# Patient Record
Sex: FEMALE | Race: WHITE | Employment: FULL TIME | URBAN - NONMETROPOLITAN AREA
[De-identification: names, ages, dates, MRNs, and addresses within clinical notes are randomized per-mention and may not be internally consistent; named-entity substitution may affect disease eponyms.]

---

## 2017-01-16 ENCOUNTER — OFFICE VISIT (OUTPATIENT)
Dept: FAMILY MEDICINE CLINIC | Age: 62
End: 2017-01-16

## 2017-01-16 VITALS
SYSTOLIC BLOOD PRESSURE: 122 MMHG | WEIGHT: 293 LBS | HEIGHT: 66 IN | RESPIRATION RATE: 12 BRPM | HEART RATE: 68 BPM | BODY MASS INDEX: 47.09 KG/M2 | DIASTOLIC BLOOD PRESSURE: 80 MMHG

## 2017-01-16 DIAGNOSIS — B02.30 HERPES ZOSTER WITH OPHTHALMIC COMPLICATION, UNSPECIFIED HERPES ZOSTER EYE DISEASE: Primary | ICD-10-CM

## 2017-01-16 DIAGNOSIS — E11.9 CONTROLLED TYPE 2 DIABETES MELLITUS WITHOUT COMPLICATION, WITHOUT LONG-TERM CURRENT USE OF INSULIN (HCC): ICD-10-CM

## 2017-01-16 DIAGNOSIS — B02.9 HERPES ZOSTER WITHOUT COMPLICATION: ICD-10-CM

## 2017-01-16 PROCEDURE — 99213 OFFICE O/P EST LOW 20 MIN: CPT | Performed by: FAMILY MEDICINE

## 2017-01-16 RX ORDER — TRAMADOL HYDROCHLORIDE 50 MG/1
50 TABLET ORAL EVERY 6 HOURS PRN
Qty: 20 TABLET | Refills: 0 | Status: SHIPPED | OUTPATIENT
Start: 2017-01-16 | End: 2017-10-19 | Stop reason: ALTCHOICE

## 2017-01-16 RX ORDER — ACYCLOVIR 800 MG/1
TABLET ORAL
Qty: 35 TABLET | Refills: 0 | Status: SHIPPED | OUTPATIENT
Start: 2017-01-16 | End: 2017-01-26

## 2017-01-16 ASSESSMENT — ENCOUNTER SYMPTOMS
VOMITING: 0
COUGH: 0
SHORTNESS OF BREATH: 0
DIARRHEA: 0
WHEEZING: 0
EYE PAIN: 0
SORE THROAT: 0
BACK PAIN: 0
CONSTIPATION: 0
NAUSEA: 0
BLOOD IN STOOL: 0
RHINORRHEA: 0
CHEST TIGHTNESS: 0
ABDOMINAL PAIN: 0

## 2017-01-19 ENCOUNTER — TELEPHONE (OUTPATIENT)
Dept: FAMILY MEDICINE CLINIC | Age: 62
End: 2017-01-19

## 2017-01-19 DIAGNOSIS — B02.30 HERPES ZOSTER WITH OPHTHALMIC COMPLICATION, UNSPECIFIED HERPES ZOSTER EYE DISEASE: Primary | ICD-10-CM

## 2017-01-19 RX ORDER — GABAPENTIN 300 MG/1
300 CAPSULE ORAL 2 TIMES DAILY
Qty: 30 CAPSULE | Refills: 0 | Status: SHIPPED | OUTPATIENT
Start: 2017-01-19 | End: 2017-10-19 | Stop reason: ALTCHOICE

## 2017-08-13 DIAGNOSIS — F32.A DEPRESSION, UNSPECIFIED DEPRESSION TYPE: ICD-10-CM

## 2017-08-13 DIAGNOSIS — R51.9 NONINTRACTABLE HEADACHE, UNSPECIFIED CHRONICITY PATTERN, UNSPECIFIED HEADACHE TYPE: ICD-10-CM

## 2017-08-13 DIAGNOSIS — E78.00 PURE HYPERCHOLESTEROLEMIA: ICD-10-CM

## 2017-08-14 DIAGNOSIS — F32.A DEPRESSION, UNSPECIFIED DEPRESSION TYPE: ICD-10-CM

## 2017-08-14 DIAGNOSIS — R51.9 NONINTRACTABLE HEADACHE, UNSPECIFIED CHRONICITY PATTERN, UNSPECIFIED HEADACHE TYPE: ICD-10-CM

## 2017-08-14 DIAGNOSIS — E78.00 PURE HYPERCHOLESTEROLEMIA: ICD-10-CM

## 2017-08-14 RX ORDER — CITALOPRAM 20 MG/1
TABLET ORAL
Qty: 30 TABLET | Refills: 0 | Status: SHIPPED | OUTPATIENT
Start: 2017-08-14 | End: 2017-10-19 | Stop reason: SDUPTHER

## 2017-08-14 RX ORDER — PROPRANOLOL HYDROCHLORIDE 160 MG/1
CAPSULE, EXTENDED RELEASE ORAL
Qty: 30 CAPSULE | Refills: 0 | Status: SHIPPED | OUTPATIENT
Start: 2017-08-14 | End: 2017-10-19 | Stop reason: SDUPTHER

## 2017-08-14 RX ORDER — ATORVASTATIN CALCIUM 10 MG/1
10 TABLET, FILM COATED ORAL DAILY
Qty: 30 TABLET | Refills: 0 | Status: SHIPPED | OUTPATIENT
Start: 2017-08-14 | End: 2017-10-19 | Stop reason: ALTCHOICE

## 2017-08-15 RX ORDER — CITALOPRAM 20 MG/1
TABLET ORAL
Qty: 90 TABLET | OUTPATIENT
Start: 2017-08-15

## 2017-08-15 RX ORDER — ATORVASTATIN CALCIUM 10 MG/1
TABLET, FILM COATED ORAL
Qty: 90 TABLET | OUTPATIENT
Start: 2017-08-15

## 2017-08-15 RX ORDER — PROPRANOLOL HYDROCHLORIDE 160 MG/1
CAPSULE, EXTENDED RELEASE ORAL
Qty: 90 CAPSULE | OUTPATIENT
Start: 2017-08-15

## 2017-08-25 LAB
AVERAGE GLUCOSE: NORMAL
CHOLESTEROL, TOTAL: 166 MG/DL
CHOLESTEROL/HDL RATIO: 4.7
HBA1C MFR BLD: 7.4 %
HDLC SERPL-MCNC: 35 MG/DL (ref 35–70)
LDL CHOLESTEROL CALCULATED: 94 MG/DL (ref 0–160)
TRIGL SERPL-MCNC: 185 MG/DL
VLDLC SERPL CALC-MCNC: 37 MG/DL

## 2017-10-06 DIAGNOSIS — F32.A DEPRESSION, UNSPECIFIED DEPRESSION TYPE: ICD-10-CM

## 2017-10-06 DIAGNOSIS — R51.9 NONINTRACTABLE HEADACHE, UNSPECIFIED CHRONICITY PATTERN, UNSPECIFIED HEADACHE TYPE: ICD-10-CM

## 2017-10-06 DIAGNOSIS — N18.30 TYPE 2 DIABETES MELLITUS WITH STAGE 3 CHRONIC KIDNEY DISEASE, WITHOUT LONG-TERM CURRENT USE OF INSULIN (HCC): ICD-10-CM

## 2017-10-06 DIAGNOSIS — E11.22 TYPE 2 DIABETES MELLITUS WITH STAGE 3 CHRONIC KIDNEY DISEASE, WITHOUT LONG-TERM CURRENT USE OF INSULIN (HCC): ICD-10-CM

## 2017-10-06 DIAGNOSIS — E78.00 PURE HYPERCHOLESTEROLEMIA: ICD-10-CM

## 2017-10-09 RX ORDER — ATORVASTATIN CALCIUM 10 MG/1
TABLET, FILM COATED ORAL
Qty: 90 TABLET | OUTPATIENT
Start: 2017-10-09

## 2017-10-09 RX ORDER — CITALOPRAM 20 MG/1
TABLET ORAL
Qty: 90 TABLET | OUTPATIENT
Start: 2017-10-09

## 2017-10-09 RX ORDER — PROPRANOLOL HYDROCHLORIDE 160 MG/1
CAPSULE, EXTENDED RELEASE ORAL
Qty: 90 CAPSULE | OUTPATIENT
Start: 2017-10-09

## 2017-10-09 RX ORDER — GLIMEPIRIDE 2 MG/1
TABLET ORAL
Qty: 180 TABLET | OUTPATIENT
Start: 2017-10-09

## 2017-10-10 DIAGNOSIS — F51.01 PRIMARY INSOMNIA: ICD-10-CM

## 2017-10-10 RX ORDER — TRAZODONE HYDROCHLORIDE 50 MG/1
TABLET ORAL
Qty: 180 TABLET | OUTPATIENT
Start: 2017-10-10

## 2017-10-19 ENCOUNTER — OFFICE VISIT (OUTPATIENT)
Dept: FAMILY MEDICINE CLINIC | Age: 62
End: 2017-10-19
Payer: COMMERCIAL

## 2017-10-19 VITALS
WEIGHT: 293 LBS | RESPIRATION RATE: 12 BRPM | BODY MASS INDEX: 47.09 KG/M2 | HEART RATE: 68 BPM | SYSTOLIC BLOOD PRESSURE: 110 MMHG | DIASTOLIC BLOOD PRESSURE: 78 MMHG | HEIGHT: 66 IN

## 2017-10-19 DIAGNOSIS — E78.00 HYPERCHOLESTEROLEMIA: ICD-10-CM

## 2017-10-19 DIAGNOSIS — E11.22 TYPE 2 DIABETES MELLITUS WITH STAGE 3 CHRONIC KIDNEY DISEASE, WITHOUT LONG-TERM CURRENT USE OF INSULIN (HCC): ICD-10-CM

## 2017-10-19 DIAGNOSIS — F32.A DEPRESSION, UNSPECIFIED DEPRESSION TYPE: ICD-10-CM

## 2017-10-19 DIAGNOSIS — R06.02 SOB (SHORTNESS OF BREATH): ICD-10-CM

## 2017-10-19 DIAGNOSIS — Z00.00 WELL ADULT EXAM: Primary | ICD-10-CM

## 2017-10-19 DIAGNOSIS — N18.30 TYPE 2 DIABETES MELLITUS WITH STAGE 3 CHRONIC KIDNEY DISEASE, WITHOUT LONG-TERM CURRENT USE OF INSULIN (HCC): ICD-10-CM

## 2017-10-19 DIAGNOSIS — R51.9 NONINTRACTABLE HEADACHE, UNSPECIFIED CHRONICITY PATTERN, UNSPECIFIED HEADACHE TYPE: ICD-10-CM

## 2017-10-19 DIAGNOSIS — Z23 NEED FOR INFLUENZA VACCINATION: ICD-10-CM

## 2017-10-19 LAB
CREATININE URINE POCT: NORMAL
MICROALBUMIN/CREAT 24H UR: NORMAL MG/G{CREAT}
MICROALBUMIN/CREAT UR-RTO: NORMAL

## 2017-10-19 PROCEDURE — 90471 IMMUNIZATION ADMIN: CPT | Performed by: FAMILY MEDICINE

## 2017-10-19 PROCEDURE — 90688 IIV4 VACCINE SPLT 0.5 ML IM: CPT | Performed by: FAMILY MEDICINE

## 2017-10-19 PROCEDURE — 99396 PREV VISIT EST AGE 40-64: CPT | Performed by: FAMILY MEDICINE

## 2017-10-19 PROCEDURE — 82044 UR ALBUMIN SEMIQUANTITATIVE: CPT | Performed by: FAMILY MEDICINE

## 2017-10-19 RX ORDER — TRAZODONE HYDROCHLORIDE 50 MG/1
50 TABLET ORAL NIGHTLY
COMMUNITY
End: 2017-10-19 | Stop reason: ALTCHOICE

## 2017-10-19 RX ORDER — CITALOPRAM 20 MG/1
TABLET ORAL
Qty: 90 TABLET | Refills: 3 | Status: SHIPPED | OUTPATIENT
Start: 2017-10-19 | End: 2017-12-21 | Stop reason: SDUPTHER

## 2017-10-19 RX ORDER — PROPRANOLOL HYDROCHLORIDE 160 MG/1
CAPSULE, EXTENDED RELEASE ORAL
Qty: 90 CAPSULE | Refills: 3 | Status: SHIPPED | OUTPATIENT
Start: 2017-10-19 | End: 2017-12-21 | Stop reason: SDUPTHER

## 2017-10-19 RX ORDER — GLIMEPIRIDE 2 MG/1
2 TABLET ORAL
Qty: 180 TABLET | Refills: 3 | Status: SHIPPED | OUTPATIENT
Start: 2017-10-19 | End: 2018-10-01 | Stop reason: SDUPTHER

## 2017-10-19 RX ORDER — PRAVASTATIN SODIUM 40 MG
40 TABLET ORAL DAILY
Qty: 30 TABLET | Refills: 0 | Status: SHIPPED | OUTPATIENT
Start: 2017-10-19 | End: 2017-12-21 | Stop reason: SDUPTHER

## 2017-10-19 RX ORDER — ASPIRIN 81 MG/1
81 TABLET ORAL DAILY
Qty: 90 TABLET | Refills: 3 | COMMUNITY
Start: 2017-10-19 | End: 2018-10-01 | Stop reason: SDUPTHER

## 2017-10-19 RX ORDER — GABAPENTIN 300 MG/1
300 CAPSULE ORAL NIGHTLY
Qty: 30 CAPSULE | Refills: 0 | Status: SHIPPED | OUTPATIENT
Start: 2017-10-19 | End: 2018-10-01 | Stop reason: ALTCHOICE

## 2017-10-19 ASSESSMENT — ENCOUNTER SYMPTOMS
ABDOMINAL PAIN: 0
RHINORRHEA: 0
CONSTIPATION: 0
SHORTNESS OF BREATH: 0
DIARRHEA: 0
BACK PAIN: 0
WHEEZING: 0
NAUSEA: 0
EYE PAIN: 0
BLOOD IN STOOL: 0
COUGH: 0
CHEST TIGHTNESS: 0
VOMITING: 0
SORE THROAT: 0

## 2017-10-19 ASSESSMENT — PATIENT HEALTH QUESTIONNAIRE - PHQ9
1. LITTLE INTEREST OR PLEASURE IN DOING THINGS: 0
2. FEELING DOWN, DEPRESSED OR HOPELESS: 0
SUM OF ALL RESPONSES TO PHQ9 QUESTIONS 1 & 2: 0
SUM OF ALL RESPONSES TO PHQ QUESTIONS 1-9: 0

## 2017-10-19 NOTE — PROGRESS NOTES
Subjective:      Patient ID: Krystal Mcnulty is a 63 y.o. female. HPI  Pt here for annual wellness exam and med refills. Reviewed BMI of 48. Encouraged diet, exercise and weight loss. Reviewed Blood work from 08/2017, a1c of 7.4. Reviewed health maintenance, flu shot. Denies any current problems, is feeling well. , former smoker, pmh reviewed. Review of Systems   Constitutional: Negative for chills, fatigue, fever and unexpected weight change. HENT: Negative for congestion, ear pain, rhinorrhea and sore throat. Eyes: Negative for pain and visual disturbance. Respiratory: Negative for cough, chest tightness, shortness of breath and wheezing. Cardiovascular: Negative for chest pain and palpitations. Gastrointestinal: Negative for abdominal pain, blood in stool, constipation, diarrhea, nausea and vomiting. Genitourinary: Negative for difficulty urinating, frequency, hematuria and urgency. Musculoskeletal: Negative for back pain, joint swelling, myalgias and neck pain. Skin: Negative for rash. Neurological: Negative for dizziness and headaches. Hematological: Negative for adenopathy. Does not bruise/bleed easily. Psychiatric/Behavioral: Negative for behavioral problems and sleep disturbance. The patient is not nervous/anxious. Objective:   Physical Exam   Constitutional: She is oriented to person, place, and time. She appears well-developed and well-nourished. HENT:   Head: Normocephalic and atraumatic. Right Ear: External ear normal.   Left Ear: External ear normal.   Nose: Nose normal.   Mouth/Throat: Oropharynx is clear and moist.   Eyes: EOM are normal. Pupils are equal, round, and reactive to light. Neck: Neck supple. Carotid bruit is not present. No thyromegaly present. Cardiovascular: Normal rate, regular rhythm and normal heart sounds. Pulmonary/Chest: Breath sounds normal. She has no wheezes. She has no rales. Abdominal: Soft.  Bowel sounds are normal. There is no tenderness. There is no rebound and no guarding. Musculoskeletal: Normal range of motion. She exhibits no edema. No open areas on the feet. Sensation intact. Lymphadenopathy:     She has no cervical adenopathy. Neurological: She is alert and oriented to person, place, and time. She has normal reflexes. No cranial nerve deficit. Skin: Skin is warm and dry. No rash noted. Psychiatric: She has a normal mood and affect. Nursing note and vitals reviewed.     Health Maintenance   Topic Date Due    Hepatitis C screen  1955    HIV screen  11/14/1972    DTaP/Tdap/Td vaccine (1 - Tdap) 11/14/1976    Flu vaccine (1) 09/01/2017    Diabetic foot exam  10/13/2017    Diabetic microalbuminuria test  10/13/2017    Diabetic retinal exam  01/11/2018    Cervical cancer screen  07/01/2018    Breast cancer screen  08/18/2018    Diabetic hemoglobin A1C test  08/25/2018    Lipid screen  08/25/2018    Colon cancer screen colonoscopy  11/20/2020    Pneumococcal med risk  Completed     Lab Results   Component Value Date    LABA1C 7.4 08/25/2017     No results found for: EAG  Lab Results   Component Value Date    CHOL 166 08/25/2017    CHOL 155 08/25/2016    CHOL 143 08/20/2015     Lab Results   Component Value Date    TRIG 185 08/25/2017    TRIG 168 08/25/2016    TRIG 124 08/20/2015     Lab Results   Component Value Date    HDL 35 08/25/2017    HDL 36 08/25/2016    HDL 38 08/20/2015     Lab Results   Component Value Date    LDLCALC 94 08/25/2017    LDLCALC 85 08/25/2016    LDLCALC 80 08/20/2015     Lab Results   Component Value Date    LABVLDL 32 (H) 01/21/2015    LABVLDL 31 (H) 09/19/2014    VLDL 37 08/25/2017    VLDL 34 08/25/2016    VLDL 25 08/20/2015     Lab Results   Component Value Date    CHOLHDLRATIO 4.7 08/25/2017    CHOLHDLRATIO 4.3 08/25/2016    CHOLHDLRATIO 3.8 08/20/2015       Assessment:      63year old well exam      Plan:      Refill meds  Reviewed blood work  Urine

## 2017-10-19 NOTE — PATIENT INSTRUCTIONS
Patient Education        Well Visit, Women 48 to 72: Care Instructions  Your Care Instructions  Physical exams can help you stay healthy. Your doctor has checked your overall health and may have suggested ways to take good care of yourself. He or she also may have recommended tests. At home, you can help prevent illness with healthy eating, regular exercise, and other steps. Follow-up care is a key part of your treatment and safety. Be sure to make and go to all appointments, and call your doctor if you are having problems. It's also a good idea to know your test results and keep a list of the medicines you take. How can you care for yourself at home? · Reach and stay at a healthy weight. This will lower your risk for many problems, such as obesity, diabetes, heart disease, and high blood pressure. · Get at least 30 minutes of exercise on most days of the week. Walking is a good choice. You also may want to do other activities, such as running, swimming, cycling, or playing tennis or team sports. · Do not smoke. Smoking can make health problems worse. If you need help quitting, talk to your doctor about stop-smoking programs and medicines. These can increase your chances of quitting for good. · Protect your skin from too much sun. When you're outdoors from 10 a.m. to 4 p.m., stay in the shade or cover up with clothing and a hat with a wide brim. Wear sunglasses that block UV rays. Even when it's cloudy, put broad-spectrum sunscreen (SPF 30 or higher) on any exposed skin. · See a dentist one or two times a year for checkups and to have your teeth cleaned. · Wear a seat belt in the car. · Limit alcohol to 1 drink a day. Too much alcohol can cause health problems. Follow your doctor's advice about when to have certain tests. These tests can spot problems early. · Cholesterol.  Your doctor will tell you how often to have this done based on your age, family history, or other things that can increase your risk for heart attack and stroke. · Blood pressure. Have your blood pressure checked during a routine doctor visit. Your doctor will tell you how often to check your blood pressure based on your age, your blood pressure results, and other factors. · Mammogram. Ask your doctor how often you should have a mammogram, which is an X-ray of your breasts. A mammogram can spot breast cancer before it can be felt and when it is easiest to treat. · Pap test and pelvic exam. Ask your doctor how often you should have a Pap test. You may not need to have a Pap test as often as you used to. · Vision. Have your eyes checked every year or two or as often as your doctor suggests. Some experts recommend that you have yearly exams for glaucoma and other age-related eye problems starting at age 48. · Hearing. Tell your doctor if you notice any change in your hearing. You can have tests to find out how well you hear. · Diabetes. Ask your doctor whether you should have tests for diabetes. · Colon cancer. You should begin tests for colon cancer at age 48. You may have one of several tests. Your doctor will tell you how often to have tests based on your age and risk. Risks include whether you already had a precancerous polyp removed from your colon or whether your parents, sisters and brothers, or children have had colon cancer. · Thyroid disease. Talk to your doctor about whether to have your thyroid checked as part of a regular physical exam. Women have an increased chance of a thyroid problem. · Osteoporosis. You should begin tests for bone density at age 72. If you are younger than 72, ask your doctor whether you have factors that may increase your risk for this disease. You may want to have this test before age 72. · Heart attack and stroke risk. At least every 4 to 6 years, you should have your risk for heart attack and stroke assessed.  Your doctor uses factors such as your age, blood pressure, cholesterol, and whether you smoke or have diabetes to show what your risk for a heart attack or stroke is over the next 10 years. When should you call for help? Watch closely for changes in your health, and be sure to contact your doctor if you have any problems or symptoms that concern you. Where can you learn more? Go to https://chpepiceweb.healthOrganically Maid. org and sign in to your Answer.To account. Enter B669 in the Aldis box to learn more about \"Well Visit, Women 50 to 72: Care Instructions. \"     If you do not have an account, please click on the \"Sign Up Now\" link. Current as of: July 19, 2016  Content Version: 11.3  © 4196-1015 Ponfac. Care instructions adapted under license by [facility] ([facility]). If you have questions about a medical condition or this instruction, always ask your healthcare professional. [place] [address] any warranty or liability for your use of this information. Patient Education        High Cholesterol: Care Instructions  Your Care Instructions  Cholesterol is a type of fat in your blood. It is needed for many body functions, such as making new cells. Cholesterol is made by your body. It also comes from food you eat. High cholesterol means that you have too much of the fat in your blood. This raises your risk of a heart attack and stroke. LDL and HDL are part of your total cholesterol. LDL is the \"bad\" cholesterol. High LDL can raise your risk for heart disease, heart attack, and stroke. HDL is the \"good\" cholesterol. It helps clear bad cholesterol from the body. High HDL is linked with a lower risk of heart disease, heart attack, and stroke. Your cholesterol levels help your doctor find out your risk for having a heart attack or stroke. You and your doctor can talk about whether you need to lower your risk and what treatment is best for you. A heart-healthy lifestyle along with medicines can help lower your cholesterol and your risk.  The way you choose to lower your risk will depend on how high your risk is for heart attack and stroke. It will also depend on how you feel about taking medicines. Follow-up care is a key part of your treatment and safety. Be sure to make and go to all appointments, and call your doctor if you are having problems. It's also a good idea to know your test results and keep a list of the medicines you take. How can you care for yourself at home? · Eat a variety of foods every day. Good choices include fruits, vegetables, whole grains (like oatmeal), dried beans and peas, nuts and seeds, soy products (like tofu), and fat-free or low-fat dairy products. · Replace butter, margarine, and hydrogenated or partially hydrogenated oils with olive and canola oils. (Canola oil margarine without trans fat is fine.)  · Replace red meat with fish, poultry, and soy protein (like tofu). · Limit processed and packaged foods like chips, crackers, and cookies. · Bake, broil, or steam foods. Don't chung them. · Be physically active. Get at least 30 minutes of exercise on most days of the week. Walking is a good choice. You also may want to do other activities, such as running, swimming, cycling, or playing tennis or team sports. · Stay at a healthy weight or lose weight by making the changes in eating and physical activity listed above. Losing just a small amount of weight, even 5 to 10 pounds, can reduce your risk for having a heart attack or stroke. · Do not smoke. When should you call for help? Watch closely for changes in your health, and be sure to contact your doctor if:  · You need help making lifestyle changes. · You have questions about your medicine. Where can you learn more? Go to https://Arch GrantspeBerkÃ¤na Wireless.Sportboom. org and sign in to your Leikr account. Enter H745 in the [employer] box to learn more about \"High Cholesterol: Care Instructions. \"     If you do not have an account, please click on the \"Sign Up Now\" link.   Current exercise on most, preferably all, days of the week. Walking is a good choice. You also may want to do other activities, such as running, swimming, cycling, or playing tennis or team sports. If your doctor says it's okay, do muscle-strengthening exercises at least 2 times a week. ¨ Take your medicines exactly as prescribed. Call your doctor if you think you are having a problem with your medicine. You will get more details on the specific medicines your doctor prescribes. · Check your blood sugar as often as your doctor recommends. It is important to keep track of any symptoms you have, such as low blood sugar. Also tell your doctor if you have any changes in your activities, diet, or insulin use. · Talk to your doctor before you start taking aspirin every day. Aspirin can help certain people lower their risk of a heart attack or stroke. But taking aspirin isn't right for everyone, because it can cause serious bleeding. · Do not smoke. If you need help quitting, talk to your doctor about stop-smoking programs and medicines. These can increase your chances of quitting for good. · Keep your cholesterol and blood pressure at normal levels. You may need to take one or more medicines to reach your goals. Take them exactly as directed. Do not stop or change a medicine without talking to your doctor first.  When should you call for help? Call 911 anytime you think you may need emergency care. For example, call if:  · You passed out (lost consciousness), or you suddenly become very sleepy or confused. (You may have very low blood sugar.)  Call your doctor now or seek immediate medical care if:  · Your blood sugar is 300 mg/dL or is higher than the level your doctor has set for you. · You have symptoms of low blood sugar, such as:  ¨ Sweating. ¨ Feeling nervous, shaky, and weak. ¨ Extreme hunger and slight nausea. ¨ Dizziness and headache. ¨ Blurred vision. ¨ Confusion.   Watch closely for changes in your health, and be sure to contact your doctor if:  · You often have problems controlling your blood sugar. · You have symptoms of long-term diabetes problems, such as:  ¨ New vision changes. ¨ New pain, numbness, or tingling in your hands or feet. ¨ Skin problems. Where can you learn more? Go to https://chpepiceweb.Showbucks. org and sign in to your Confidex account. Enter C553 in the Soompi box to learn more about \"Type 2 Diabetes: Care Instructions. \"     If you do not have an account, please click on the \"Sign Up Now\" link. Current as of: March 13, 2017  Content Version: 11.3  © 1720-0778 Pomelo. Care instructions adapted under license by [facility] ([facility]). If you have questions about a medical condition or this instruction, always ask your healthcare professional. [place] [address] any warranty or liability for your use of this information. Patient Education        DASH Diet: Care Instructions  Your Care Instructions  The DASH diet is an eating plan that can help lower your blood pressure. DASH stands for Dietary Approaches to Stop Hypertension. Hypertension is high blood pressure. The DASH diet focuses on eating foods that are high in calcium, potassium, and magnesium. These nutrients can lower blood pressure. The foods that are highest in these nutrients are fruits, vegetables, low-fat dairy products, nuts, seeds, and legumes. But taking calcium, potassium, and magnesium supplements instead of eating foods that are high in those nutrients does not have the same effect. The DASH diet also includes whole grains, fish, and poultry. The DASH diet is one of several lifestyle changes your doctor may recommend to lower your high blood pressure. Your doctor may also want you to decrease the amount of sodium in your diet. Lowering sodium while following the DASH diet can lower blood pressure even further than just the DASH diet alone.   Follow-up care is a key part of your treatment and safety. Be sure to make and go to all appointments, and call your doctor if you are having problems. It's also a good idea to know your test results and keep a list of the medicines you take. How can you care for yourself at home? Following the DASH diet  · Eat 4 to 5 servings of fruit each day. A serving is 1 medium-sized piece of fruit, ½ cup chopped or canned fruit, 1/4 cup dried fruit, or 4 ounces (½ cup) of fruit juice. Choose fruit more often than fruit juice. · Eat 4 to 5 servings of vegetables each day. A serving is 1 cup of lettuce or raw leafy vegetables, ½ cup of chopped or cooked vegetables, or 4 ounces (½ cup) of vegetable juice. Choose vegetables more often than vegetable juice. · Get 2 to 3 servings of low-fat and fat-free dairy each day. A serving is 8 ounces of milk, 1 cup of yogurt, or 1 ½ ounces of cheese. · Eat 6 to 8 servings of grains each day. A serving is 1 slice of bread, 1 ounce of dry cereal, or ½ cup of cooked rice, pasta, or cooked cereal. Try to choose whole-grain products as much as possible. · Limit lean meat, poultry, and fish to 2 servings each day. A serving is 3 ounces, about the size of a deck of cards. · Eat 4 to 5 servings of nuts, seeds, and legumes (cooked dried beans, lentils, and split peas) each week. A serving is 1/3 cup of nuts, 2 tablespoons of seeds, or ½ cup of cooked beans or peas. · Limit fats and oils to 2 to 3 servings each day. A serving is 1 teaspoon of vegetable oil or 2 tablespoons of salad dressing. · Limit sweets and added sugars to 5 servings or less a week. A serving is 1 tablespoon jelly or jam, ½ cup sorbet, or 1 cup of lemonade. · Eat less than 2,300 milligrams (mg) of sodium a day. If you limit your sodium to 1,500 mg a day, you can lower your blood pressure even more. Tips for success  · Start small. Do not try to make dramatic changes to your diet all at once.  You might feel that you are missing out on your favorite foods and then be more likely to not follow the plan. Make small changes, and stick with them. Once those changes become habit, add a few more changes. · Try some of the following:  ¨ Make it a goal to eat a fruit or vegetable at every meal and at snacks. This will make it easy to get the recommended amount of fruits and vegetables each day. ¨ Try yogurt topped with fruit and nuts for a snack or healthy dessert. ¨ Add lettuce, tomato, cucumber, and onion to sandwiches. ¨ Combine a ready-made pizza crust with low-fat mozzarella cheese and lots of vegetable toppings. Try using tomatoes, squash, spinach, broccoli, carrots, cauliflower, and onions. ¨ Have a variety of cut-up vegetables with a low-fat dip as an appetizer instead of chips and dip. ¨ Sprinkle sunflower seeds or chopped almonds over salads. Or try adding chopped walnuts or almonds to cooked vegetables. ¨ Try some vegetarian meals using beans and peas. Add garbanzo or kidney beans to salads. Make burritos and tacos with mashed watts beans or black beans. Where can you learn more? Go to https://SkiftpeWorkMeIn.Embarke. org and sign in to your Gather account. Enter B841 in the TradeTools FX box to learn more about \"DASH Diet: Care Instructions. \"     If you do not have an account, please click on the \"Sign Up Now\" link. Current as of: April 3, 2017  Content Version: 11.3  © 5944-1922 Owned it, Incorporated. Care instructions adapted under license by [facility] ([facility]). If you have questions about a medical condition or this instruction, always ask your healthcare professional. [place] [address] any warranty or liability for your use of this information.

## 2017-10-19 NOTE — PROGRESS NOTES
After obtaining consent, and per orders of Dr. Surinder Ellis, injection of Influenza vaccine 0.5 mL IM left deltoid given by Riley Alexis. Patient tolerated well.     Pt provided urine sample in office

## 2017-12-06 ENCOUNTER — TELEPHONE (OUTPATIENT)
Dept: FAMILY MEDICINE CLINIC | Age: 62
End: 2017-12-06

## 2017-12-06 RX ORDER — AMOXICILLIN AND CLAVULANATE POTASSIUM 875; 125 MG/1; MG/1
1 TABLET, FILM COATED ORAL 2 TIMES DAILY
Qty: 20 TABLET | Refills: 0 | Status: SHIPPED | OUTPATIENT
Start: 2017-12-06 | End: 2017-12-16

## 2017-12-21 DIAGNOSIS — F32.A DEPRESSION, UNSPECIFIED DEPRESSION TYPE: ICD-10-CM

## 2017-12-21 DIAGNOSIS — R51.9 NONINTRACTABLE HEADACHE, UNSPECIFIED CHRONICITY PATTERN, UNSPECIFIED HEADACHE TYPE: ICD-10-CM

## 2017-12-21 DIAGNOSIS — E78.00 HYPERCHOLESTEROLEMIA: ICD-10-CM

## 2017-12-21 RX ORDER — TRAZODONE HYDROCHLORIDE 50 MG/1
50 TABLET ORAL NIGHTLY
Qty: 60 TABLET | Refills: 0 | Status: SHIPPED | OUTPATIENT
Start: 2017-12-21 | End: 2018-10-01 | Stop reason: ALTCHOICE

## 2017-12-21 RX ORDER — CITALOPRAM 20 MG/1
TABLET ORAL
Qty: 30 TABLET | Refills: 0 | Status: SHIPPED | OUTPATIENT
Start: 2017-12-21 | End: 2018-10-01 | Stop reason: SDUPTHER

## 2017-12-21 RX ORDER — PRAVASTATIN SODIUM 40 MG
40 TABLET ORAL DAILY
Qty: 30 TABLET | Refills: 0 | Status: CANCELLED | OUTPATIENT
Start: 2017-12-21

## 2017-12-21 RX ORDER — TRAZODONE HYDROCHLORIDE 50 MG/1
50 TABLET ORAL NIGHTLY
Qty: 90 TABLET | Refills: 0 | Status: CANCELLED | OUTPATIENT
Start: 2017-12-21

## 2017-12-21 RX ORDER — PRAVASTATIN SODIUM 40 MG
40 TABLET ORAL DAILY
Qty: 30 TABLET | Refills: 0 | Status: SHIPPED | OUTPATIENT
Start: 2017-12-21 | End: 2018-10-01 | Stop reason: SDUPTHER

## 2017-12-21 RX ORDER — TRAZODONE HYDROCHLORIDE 50 MG/1
50 TABLET ORAL NIGHTLY
Qty: 180 TABLET | Refills: 3 | Status: SHIPPED | OUTPATIENT
Start: 2017-12-21 | End: 2017-12-21 | Stop reason: SDUPTHER

## 2017-12-21 RX ORDER — PRAVASTATIN SODIUM 40 MG
40 TABLET ORAL DAILY
Qty: 90 TABLET | Refills: 3 | Status: SHIPPED | OUTPATIENT
Start: 2017-12-21 | End: 2017-12-21 | Stop reason: SDUPTHER

## 2017-12-21 RX ORDER — PROPRANOLOL HYDROCHLORIDE 160 MG/1
CAPSULE, EXTENDED RELEASE ORAL
Qty: 30 CAPSULE | Refills: 0 | Status: SHIPPED | OUTPATIENT
Start: 2017-12-21 | End: 2018-10-01 | Stop reason: SDUPTHER

## 2017-12-21 NOTE — TELEPHONE ENCOUNTER
All Rx's sent except trazodone. Please call her about this. Looks like it was stopped at 10/19/17 visit with KEISHA SHEPHERD

## 2017-12-26 DIAGNOSIS — F51.01 PRIMARY INSOMNIA: Primary | ICD-10-CM

## 2017-12-26 RX ORDER — TRAZODONE HYDROCHLORIDE 50 MG/1
TABLET ORAL
Qty: 180 TABLET | Refills: 3 | Status: SHIPPED | OUTPATIENT
Start: 2017-12-26 | End: 2018-10-01 | Stop reason: SDUPTHER

## 2018-03-29 ENCOUNTER — OFFICE VISIT (OUTPATIENT)
Dept: FAMILY MEDICINE CLINIC | Age: 63
End: 2018-03-29
Payer: COMMERCIAL

## 2018-03-29 VITALS
DIASTOLIC BLOOD PRESSURE: 78 MMHG | BODY MASS INDEX: 48.25 KG/M2 | HEART RATE: 72 BPM | WEIGHT: 293 LBS | RESPIRATION RATE: 12 BRPM | SYSTOLIC BLOOD PRESSURE: 122 MMHG

## 2018-03-29 DIAGNOSIS — R51.9 NONINTRACTABLE HEADACHE, UNSPECIFIED CHRONICITY PATTERN, UNSPECIFIED HEADACHE TYPE: ICD-10-CM

## 2018-03-29 DIAGNOSIS — R30.0 DYSURIA: Primary | ICD-10-CM

## 2018-03-29 DIAGNOSIS — R35.0 FREQUENCY OF URINATION: ICD-10-CM

## 2018-03-29 LAB
BILIRUBIN, POC: NORMAL
BLOOD URINE, POC: NORMAL
CLARITY, POC: CLEAR
COLOR, POC: YELLOW
GLUCOSE URINE, POC: NORMAL
KETONES, POC: NORMAL
LEUKOCYTE EST, POC: NORMAL
NITRITE, POC: NORMAL
PH, POC: 6
PROTEIN, POC: NORMAL
SPECIFIC GRAVITY, POC: 1.02
UROBILINOGEN, POC: 0.2

## 2018-03-29 PROCEDURE — 81003 URINALYSIS AUTO W/O SCOPE: CPT | Performed by: FAMILY MEDICINE

## 2018-03-29 PROCEDURE — 99213 OFFICE O/P EST LOW 20 MIN: CPT | Performed by: FAMILY MEDICINE

## 2018-03-29 RX ORDER — HYDROCODONE BITARTRATE AND ACETAMINOPHEN 5; 325 MG/1; MG/1
1 TABLET ORAL EVERY 6 HOURS PRN
Qty: 20 TABLET | Refills: 0 | Status: SHIPPED | OUTPATIENT
Start: 2018-03-29 | End: 2018-11-01 | Stop reason: SDUPTHER

## 2018-03-29 RX ORDER — CIPROFLOXACIN 250 MG/1
250 TABLET, FILM COATED ORAL 2 TIMES DAILY
Qty: 6 TABLET | Refills: 0 | Status: SHIPPED | OUTPATIENT
Start: 2018-03-29 | End: 2018-04-01

## 2018-03-29 ASSESSMENT — ENCOUNTER SYMPTOMS
DIARRHEA: 0
EYE PAIN: 0
BACK PAIN: 1
SHORTNESS OF BREATH: 0
NAUSEA: 0
CONSTIPATION: 0
WHEEZING: 0
SORE THROAT: 0
CHEST TIGHTNESS: 0
VOMITING: 0
ABDOMINAL PAIN: 0
RHINORRHEA: 0
BLOOD IN STOOL: 0
COUGH: 0

## 2018-03-29 NOTE — PATIENT INSTRUCTIONS
For example:  ¨ You have blood or pus in your urine. ¨ You have chills or body aches. ¨ It hurts worse to urinate. ¨ You have groin or belly pain. ¨ You have pain in your back just below your rib cage (the flank area). ? Watch closely for changes in your health, and be sure to contact your doctor if you have any problems. Where can you learn more? Go to https://Vantageouspeshelleyeweb.Wagaduu. org and sign in to your iRewind account. Enter D495 in the Agile Therapeutics box to learn more about \"Painful Urination (Dysuria): Care Instructions. \"     If you do not have an account, please click on the \"Sign Up Now\" link. Current as of: May 12, 2017  Content Version: 11.5  © 8209-6574 SoundCloud. Care instructions adapted under license by [facility] ([facility]). If you have questions about a medical condition or this instruction, always ask your healthcare professional. [place] [address] any warranty or liability for your use of this information. Patient Education        Headache: Care Instructions  Your Care Instructions    Headaches have many possible causes. Most headaches aren't a sign of a more serious problem, and they will get better on their own. Home treatment may help you feel better faster. The doctor has checked you carefully, but problems can develop later. If you notice any problems or new symptoms, get medical treatment right away. Follow-up care is a key part of your treatment and safety. Be sure to make and go to all appointments, and call your doctor if you are having problems. It's also a good idea to know your test results and keep a list of the medicines you take. How can you care for yourself at home? · Do not drive if you have taken a prescription pain medicine. · Rest in a quiet, dark room until your headache is gone. Close your eyes and try to relax or go to sleep. Don't watch TV or read.   · Put a cold, moist cloth or cold pack on the painful area for 10 without food can trigger a headache. · Treat yourself to a massage. Some people find that regular massages are very helpful in relieving tension. · Limit caffeine by not drinking too much coffee, tea, or soda. But don't quit caffeine suddenly, because that can also give you headaches. · Reduce eyestrain from computers by blinking frequently and looking away from the computer screen every so often. Make sure you have proper eyewear and that your monitor is set up properly, about an arm's length away. · Seek help if you have depression or anxiety. Your headaches may be linked to these conditions. Treatment can both prevent headaches and help with symptoms of anxiety or depression. When should you call for help? Call 911 anytime you think you may need emergency care. For example, call if:  ? · You have signs of a stroke. These may include:  ¨ Sudden numbness, paralysis, or weakness in your face, arm, or leg, especially on only one side of your body. ¨ Sudden vision changes. ¨ Sudden trouble speaking. ¨ Sudden confusion or trouble understanding simple statements. ¨ Sudden problems with walking or balance. ¨ A sudden, severe headache that is different from past headaches. ?Call your doctor now or seek immediate medical care if:  ? · You have a new or worse headache. ? · Your headache gets much worse. Where can you learn more? Go to https://DataFox.Lynx Sportswear. org and sign in to your Wings Intellect account. Enter 0359 [id number] in the [facility] box to learn more about \"Headache: Care Instructions. \"     If you do not have an account, please click on the \"Sign Up Now\" link. Current as of: October 14, 2016  Content Version: 11.5  © 4389-4239 wavecatch. Care instructions adapted under license by [facility] ([facility]).  If you have questions about a medical condition or this instruction, always ask your healthcare professional. Anurag Perla any warranty or liability for your use of this information.

## 2018-09-07 LAB
ALBUMIN SERPL-MCNC: 3.9 G/DL
ALP BLD-CCNC: 116 U/L
ALT SERPL-CCNC: 11 U/L
ANION GAP SERPL CALCULATED.3IONS-SCNC: NORMAL MMOL/L
AST SERPL-CCNC: 8 U/L
AVERAGE GLUCOSE: NORMAL
BILIRUB SERPL-MCNC: 0.3 MG/DL (ref 0.1–1.4)
BUN BLDV-MCNC: 17 MG/DL
CALCIUM SERPL-MCNC: 9.2 MG/DL
CHLORIDE BLD-SCNC: 100 MMOL/L
CHOLESTEROL, TOTAL: 162 MG/DL
CHOLESTEROL/HDL RATIO: 4
CO2: NORMAL MMOL/L
CREAT SERPL-MCNC: 1.33 MG/DL
GFR CALCULATED: 43
GLUCOSE BLD-MCNC: 184 MG/DL
HBA1C MFR BLD: 8.2 %
HDLC SERPL-MCNC: 41 MG/DL (ref 35–70)
LDL CHOLESTEROL CALCULATED: 95 MG/DL (ref 0–160)
POTASSIUM SERPL-SCNC: 5 MMOL/L
SODIUM BLD-SCNC: 141 MMOL/L
TOTAL PROTEIN: 7
TRIGL SERPL-MCNC: 130 MG/DL
TSH SERPL DL<=0.05 MIU/L-ACNC: 0.61 UIU/ML
URIC ACID: 5.6
VLDLC SERPL CALC-MCNC: 26 MG/DL

## 2018-09-27 ENCOUNTER — HOSPITAL ENCOUNTER (OUTPATIENT)
Dept: ULTRASOUND IMAGING | Age: 63
Discharge: HOME | End: 2018-09-27
Payer: COMMERCIAL

## 2018-09-27 DIAGNOSIS — E04.1 THYROID NODULE: ICD-10-CM

## 2018-09-27 PROCEDURE — 76536 US EXAM OF HEAD AND NECK: CPT

## 2018-10-01 ENCOUNTER — OFFICE VISIT (OUTPATIENT)
Dept: FAMILY MEDICINE CLINIC | Age: 63
End: 2018-10-01
Payer: COMMERCIAL

## 2018-10-01 VITALS
RESPIRATION RATE: 16 BRPM | SYSTOLIC BLOOD PRESSURE: 134 MMHG | DIASTOLIC BLOOD PRESSURE: 88 MMHG | BODY MASS INDEX: 48.7 KG/M2 | WEIGHT: 293 LBS | HEART RATE: 72 BPM

## 2018-10-01 DIAGNOSIS — N18.30 TYPE 2 DIABETES MELLITUS WITH STAGE 3 CHRONIC KIDNEY DISEASE, WITHOUT LONG-TERM CURRENT USE OF INSULIN (HCC): ICD-10-CM

## 2018-10-01 DIAGNOSIS — E78.00 HYPERCHOLESTEROLEMIA: ICD-10-CM

## 2018-10-01 DIAGNOSIS — F51.01 PRIMARY INSOMNIA: ICD-10-CM

## 2018-10-01 DIAGNOSIS — F32.A DEPRESSION, UNSPECIFIED DEPRESSION TYPE: ICD-10-CM

## 2018-10-01 DIAGNOSIS — R06.02 SOB (SHORTNESS OF BREATH): ICD-10-CM

## 2018-10-01 DIAGNOSIS — E11.22 TYPE 2 DIABETES MELLITUS WITH STAGE 3 CHRONIC KIDNEY DISEASE, WITHOUT LONG-TERM CURRENT USE OF INSULIN (HCC): ICD-10-CM

## 2018-10-01 DIAGNOSIS — R51.9 NONINTRACTABLE HEADACHE, UNSPECIFIED CHRONICITY PATTERN, UNSPECIFIED HEADACHE TYPE: ICD-10-CM

## 2018-10-01 DIAGNOSIS — Z00.00 WELL ADULT EXAM: Primary | ICD-10-CM

## 2018-10-01 LAB
CREATININE URINE POCT: 300
MICROALBUMIN/CREAT 24H UR: 30 MG/G{CREAT}
MICROALBUMIN/CREAT UR-RTO: <30

## 2018-10-01 PROCEDURE — 82044 UR ALBUMIN SEMIQUANTITATIVE: CPT | Performed by: FAMILY MEDICINE

## 2018-10-01 PROCEDURE — 99396 PREV VISIT EST AGE 40-64: CPT | Performed by: FAMILY MEDICINE

## 2018-10-01 RX ORDER — TRAZODONE HYDROCHLORIDE 50 MG/1
TABLET ORAL
Qty: 270 TABLET | Refills: 3 | Status: SHIPPED | OUTPATIENT
Start: 2018-10-01 | End: 2019-10-29 | Stop reason: SDUPTHER

## 2018-10-01 RX ORDER — GLIMEPIRIDE 2 MG/1
2 TABLET ORAL
Qty: 180 TABLET | Refills: 3 | Status: SHIPPED | OUTPATIENT
Start: 2018-10-01 | End: 2019-10-29 | Stop reason: SDUPTHER

## 2018-10-01 RX ORDER — PROPRANOLOL HYDROCHLORIDE 160 MG/1
CAPSULE, EXTENDED RELEASE ORAL
Qty: 90 CAPSULE | Refills: 3 | Status: SHIPPED | OUTPATIENT
Start: 2018-10-01 | End: 2019-05-31 | Stop reason: SDUPTHER

## 2018-10-01 RX ORDER — CITALOPRAM 20 MG/1
TABLET ORAL
Qty: 90 TABLET | Refills: 3 | Status: SHIPPED | OUTPATIENT
Start: 2018-10-01 | End: 2019-05-31 | Stop reason: SDUPTHER

## 2018-10-01 RX ORDER — PRAVASTATIN SODIUM 40 MG
40 TABLET ORAL DAILY
Qty: 90 TABLET | Refills: 3 | Status: SHIPPED | OUTPATIENT
Start: 2018-10-01 | End: 2019-05-31 | Stop reason: SDUPTHER

## 2018-10-01 RX ORDER — ASPIRIN 81 MG/1
81 TABLET ORAL DAILY
Qty: 90 TABLET | Refills: 3 | COMMUNITY
Start: 2018-10-01 | End: 2021-08-02

## 2018-10-01 ASSESSMENT — ENCOUNTER SYMPTOMS
SHORTNESS OF BREATH: 0
CONSTIPATION: 0
VOMITING: 0
NAUSEA: 0
BACK PAIN: 0
COUGH: 0
SORE THROAT: 0
DIARRHEA: 0
EYE PAIN: 0
BLOOD IN STOOL: 0
RHINORRHEA: 0
ABDOMINAL PAIN: 0
CHEST TIGHTNESS: 0
WHEEZING: 0

## 2018-10-01 NOTE — PATIENT INSTRUCTIONS
smoke or have diabetes to show what your risk for a heart attack or stroke is over the next 10 years. When should you call for help? Watch closely for changes in your health, and be sure to contact your doctor if you have any problems or symptoms that concern you. Where can you learn more? Go to https://chpepiceweb.[email or web address] org and sign in to your [employer] account. Enter K679 in the [facility] box to learn more about \"Well Visit, Women 50 to 72: Care Instructions. \"     If you do not have an account, please click on the \"Sign Up Now\" link. Current as of: May 16, 2017  Content Version: 11.7  © 6483-4355 rumr: turn off the lights. Care instructions adapted under license by [facility] ([facility]). If you have questions about a medical condition or this instruction, always ask your healthcare professional. [place] [address] any warranty or liability for your use of this information. Patient Education        Type 2 Diabetes: Care Instructions  Your Care Instructions    Type 2 diabetes is a disease that develops when the body's tissues cannot use insulin properly. Over time, the pancreas cannot make enough insulin. Insulin is a hormone that helps the body's cells use sugar (glucose) for energy. It also helps the body store extra sugar in muscle, fat, and liver cells. Without insulin, the sugar cannot get into the cells to do its work. It stays in the blood instead. This can cause high blood sugar levels. A person has diabetes when the blood sugar stays too high too much of the time. Over time, diabetes can lead to diseases of the heart, blood vessels, nerves, kidneys, and eyes. You may be able to control your blood sugar by losing weight, eating a healthy diet, and getting daily exercise. You may also have to take insulin or other diabetes medicine. Follow-up care is a key part of your treatment and safety. Be sure to make and go to all appointments.  Call your doctor if you are

## 2018-10-22 DIAGNOSIS — F32.A DEPRESSION, UNSPECIFIED DEPRESSION TYPE: ICD-10-CM

## 2018-10-22 DIAGNOSIS — E78.00 HYPERCHOLESTEROLEMIA: ICD-10-CM

## 2018-10-22 DIAGNOSIS — R51.9 NONINTRACTABLE HEADACHE, UNSPECIFIED CHRONICITY PATTERN, UNSPECIFIED HEADACHE TYPE: ICD-10-CM

## 2018-10-22 RX ORDER — PRAVASTATIN SODIUM 40 MG
TABLET ORAL
Qty: 30 TABLET | Refills: 0 | Status: SHIPPED | OUTPATIENT
Start: 2018-10-22 | End: 2019-03-19

## 2018-10-22 RX ORDER — CITALOPRAM 20 MG/1
TABLET ORAL
Qty: 30 TABLET | Refills: 0 | Status: SHIPPED | OUTPATIENT
Start: 2018-10-22 | End: 2019-03-19

## 2018-10-22 RX ORDER — TRAZODONE HYDROCHLORIDE 50 MG/1
TABLET ORAL
Qty: 60 TABLET | Refills: 0 | Status: SHIPPED | OUTPATIENT
Start: 2018-10-22 | End: 2019-03-19

## 2018-10-22 RX ORDER — PROPRANOLOL HYDROCHLORIDE 160 MG/1
CAPSULE, EXTENDED RELEASE ORAL
Qty: 30 CAPSULE | Refills: 0 | Status: SHIPPED | OUTPATIENT
Start: 2018-10-22 | End: 2019-03-19

## 2018-11-01 DIAGNOSIS — R51.9 NONINTRACTABLE HEADACHE, UNSPECIFIED CHRONICITY PATTERN, UNSPECIFIED HEADACHE TYPE: ICD-10-CM

## 2018-11-01 RX ORDER — HYDROCODONE BITARTRATE AND ACETAMINOPHEN 5; 325 MG/1; MG/1
1 TABLET ORAL EVERY 6 HOURS PRN
Qty: 20 TABLET | Refills: 0 | Status: SHIPPED | OUTPATIENT
Start: 2018-11-01 | End: 2020-08-27 | Stop reason: SDUPTHER

## 2018-12-31 ENCOUNTER — OFFICE VISIT (OUTPATIENT)
Dept: FAMILY MEDICINE CLINIC | Age: 63
End: 2018-12-31
Payer: COMMERCIAL

## 2018-12-31 VITALS
HEART RATE: 67 BPM | OXYGEN SATURATION: 93 % | BODY MASS INDEX: 45.99 KG/M2 | DIASTOLIC BLOOD PRESSURE: 86 MMHG | SYSTOLIC BLOOD PRESSURE: 124 MMHG | TEMPERATURE: 99.3 F | RESPIRATION RATE: 16 BRPM | WEIGHT: 293 LBS | HEIGHT: 67 IN

## 2018-12-31 DIAGNOSIS — J01.40 ACUTE NON-RECURRENT PANSINUSITIS: ICD-10-CM

## 2018-12-31 DIAGNOSIS — J20.9 ACUTE BRONCHITIS, UNSPECIFIED ORGANISM: Primary | ICD-10-CM

## 2018-12-31 PROCEDURE — 99213 OFFICE O/P EST LOW 20 MIN: CPT | Performed by: FAMILY MEDICINE

## 2018-12-31 RX ORDER — AMOXICILLIN AND CLAVULANATE POTASSIUM 875; 125 MG/1; MG/1
1 TABLET, FILM COATED ORAL 2 TIMES DAILY
Qty: 20 TABLET | Refills: 0 | Status: SHIPPED | OUTPATIENT
Start: 2018-12-31 | End: 2019-01-07 | Stop reason: ALTCHOICE

## 2018-12-31 RX ORDER — GUAIFENESIN AND CODEINE PHOSPHATE 100; 10 MG/5ML; MG/5ML
5-10 SOLUTION ORAL 4 TIMES DAILY PRN
Qty: 120 ML | Refills: 0 | Status: SHIPPED | OUTPATIENT
Start: 2018-12-31 | End: 2019-01-07 | Stop reason: SDUPTHER

## 2019-01-02 ENCOUNTER — TELEPHONE (OUTPATIENT)
Dept: FAMILY MEDICINE CLINIC | Age: 64
End: 2019-01-02

## 2019-01-03 ENCOUNTER — TELEPHONE (OUTPATIENT)
Dept: FAMILY MEDICINE CLINIC | Age: 64
End: 2019-01-03

## 2019-01-07 ENCOUNTER — HOSPITAL ENCOUNTER (OUTPATIENT)
Age: 64
Discharge: HOME | End: 2019-01-07
Payer: COMMERCIAL

## 2019-01-07 ENCOUNTER — HOSPITAL ENCOUNTER (OUTPATIENT)
Dept: GENERAL RADIOLOGY | Age: 64
Discharge: HOME | End: 2019-01-07
Payer: COMMERCIAL

## 2019-01-07 ENCOUNTER — OFFICE VISIT (OUTPATIENT)
Dept: FAMILY MEDICINE CLINIC | Age: 64
End: 2019-01-07
Payer: COMMERCIAL

## 2019-01-07 ENCOUNTER — TELEPHONE (OUTPATIENT)
Dept: FAMILY MEDICINE CLINIC | Age: 64
End: 2019-01-07

## 2019-01-07 VITALS
TEMPERATURE: 98.5 F | HEART RATE: 68 BPM | BODY MASS INDEX: 46.55 KG/M2 | WEIGHT: 292.8 LBS | RESPIRATION RATE: 20 BRPM | SYSTOLIC BLOOD PRESSURE: 130 MMHG | OXYGEN SATURATION: 93 % | DIASTOLIC BLOOD PRESSURE: 88 MMHG

## 2019-01-07 DIAGNOSIS — J20.9 ACUTE BRONCHITIS, UNSPECIFIED ORGANISM: ICD-10-CM

## 2019-01-07 DIAGNOSIS — J20.9 ACUTE BRONCHITIS, UNSPECIFIED ORGANISM: Primary | ICD-10-CM

## 2019-01-07 PROCEDURE — 71046 X-RAY EXAM CHEST 2 VIEWS: CPT

## 2019-01-07 PROCEDURE — 99213 OFFICE O/P EST LOW 20 MIN: CPT | Performed by: FAMILY MEDICINE

## 2019-01-07 RX ORDER — GUAIFENESIN AND CODEINE PHOSPHATE 100; 10 MG/5ML; MG/5ML
5-10 SOLUTION ORAL 4 TIMES DAILY PRN
Qty: 120 ML | Refills: 0 | Status: SHIPPED | OUTPATIENT
Start: 2019-01-07 | End: 2019-01-14

## 2019-01-07 RX ORDER — LEVOFLOXACIN 500 MG/1
500 TABLET, FILM COATED ORAL DAILY
Qty: 10 TABLET | Refills: 0 | Status: SHIPPED | OUTPATIENT
Start: 2019-01-07 | End: 2019-01-17

## 2019-01-07 ASSESSMENT — ENCOUNTER SYMPTOMS
COUGH: 1
SINUS PAIN: 1
EYE PAIN: 0
BLOOD IN STOOL: 0
VOMITING: 0
WHEEZING: 0
BACK PAIN: 0
ABDOMINAL PAIN: 0
SHORTNESS OF BREATH: 0
RHINORRHEA: 0
SINUS PRESSURE: 1
CHEST TIGHTNESS: 0
DIARRHEA: 0
CONSTIPATION: 0
SORE THROAT: 0
NAUSEA: 0

## 2019-03-19 ENCOUNTER — TELEPHONE (OUTPATIENT)
Dept: FAMILY MEDICINE CLINIC | Age: 64
End: 2019-03-19

## 2019-03-19 ENCOUNTER — OFFICE VISIT (OUTPATIENT)
Dept: FAMILY MEDICINE CLINIC | Age: 64
End: 2019-03-19
Payer: COMMERCIAL

## 2019-03-19 VITALS
SYSTOLIC BLOOD PRESSURE: 134 MMHG | TEMPERATURE: 98.7 F | DIASTOLIC BLOOD PRESSURE: 86 MMHG | BODY MASS INDEX: 47.03 KG/M2 | RESPIRATION RATE: 12 BRPM | HEART RATE: 72 BPM | WEIGHT: 293 LBS

## 2019-03-19 DIAGNOSIS — E11.22 TYPE 2 DIABETES MELLITUS WITH STAGE 3 CHRONIC KIDNEY DISEASE, WITHOUT LONG-TERM CURRENT USE OF INSULIN (HCC): ICD-10-CM

## 2019-03-19 DIAGNOSIS — N18.30 TYPE 2 DIABETES MELLITUS WITH STAGE 3 CHRONIC KIDNEY DISEASE, WITHOUT LONG-TERM CURRENT USE OF INSULIN (HCC): ICD-10-CM

## 2019-03-19 DIAGNOSIS — Z23 NEED FOR ZOSTER VACCINATION: ICD-10-CM

## 2019-03-19 DIAGNOSIS — E11.22 TYPE 2 DIABETES MELLITUS WITH STAGE 3 CHRONIC KIDNEY DISEASE, WITHOUT LONG-TERM CURRENT USE OF INSULIN (HCC): Primary | ICD-10-CM

## 2019-03-19 DIAGNOSIS — N18.30 TYPE 2 DIABETES MELLITUS WITH STAGE 3 CHRONIC KIDNEY DISEASE, WITHOUT LONG-TERM CURRENT USE OF INSULIN (HCC): Primary | ICD-10-CM

## 2019-03-19 DIAGNOSIS — J01.41 ACUTE RECURRENT PANSINUSITIS: Primary | ICD-10-CM

## 2019-03-19 DIAGNOSIS — E66.01 MORBID OBESITY WITH BMI OF 45.0-49.9, ADULT (HCC): ICD-10-CM

## 2019-03-19 DIAGNOSIS — R68.2 DRY MOUTH: ICD-10-CM

## 2019-03-19 LAB — HBA1C MFR BLD: 7.4 % (ref 4.3–5.7)

## 2019-03-19 PROCEDURE — 90750 HZV VACC RECOMBINANT IM: CPT | Performed by: FAMILY MEDICINE

## 2019-03-19 PROCEDURE — 99214 OFFICE O/P EST MOD 30 MIN: CPT | Performed by: FAMILY MEDICINE

## 2019-03-19 PROCEDURE — 90471 IMMUNIZATION ADMIN: CPT | Performed by: FAMILY MEDICINE

## 2019-03-19 PROCEDURE — 83036 HEMOGLOBIN GLYCOSYLATED A1C: CPT | Performed by: FAMILY MEDICINE

## 2019-03-19 RX ORDER — AMOXICILLIN AND CLAVULANATE POTASSIUM 875; 125 MG/1; MG/1
1 TABLET, FILM COATED ORAL 2 TIMES DAILY
Qty: 20 TABLET | Refills: 0 | Status: SHIPPED | OUTPATIENT
Start: 2019-03-19 | End: 2019-10-29 | Stop reason: SDUPTHER

## 2019-03-19 ASSESSMENT — ENCOUNTER SYMPTOMS
ABDOMINAL PAIN: 0
NAUSEA: 0
BLOOD IN STOOL: 0
COUGH: 0
EYE PAIN: 0
SORE THROAT: 0
WHEEZING: 0
CONSTIPATION: 0
EYE REDNESS: 1
BACK PAIN: 0
RHINORRHEA: 0
DIARRHEA: 0
SHORTNESS OF BREATH: 0
EYE DISCHARGE: 1
VOMITING: 0
CHEST TIGHTNESS: 0
SINUS PRESSURE: 1

## 2019-05-31 DIAGNOSIS — F32.A DEPRESSION, UNSPECIFIED DEPRESSION TYPE: ICD-10-CM

## 2019-05-31 DIAGNOSIS — R51.9 NONINTRACTABLE HEADACHE, UNSPECIFIED CHRONICITY PATTERN, UNSPECIFIED HEADACHE TYPE: ICD-10-CM

## 2019-05-31 DIAGNOSIS — E78.00 HYPERCHOLESTEROLEMIA: ICD-10-CM

## 2019-05-31 RX ORDER — PROPRANOLOL HYDROCHLORIDE 160 MG/1
CAPSULE, EXTENDED RELEASE ORAL
Qty: 10 CAPSULE | Refills: 0 | Status: SHIPPED | OUTPATIENT
Start: 2019-05-31 | End: 2019-10-29 | Stop reason: SDUPTHER

## 2019-05-31 RX ORDER — CITALOPRAM 20 MG/1
TABLET ORAL
Qty: 10 TABLET | Refills: 0 | Status: SHIPPED | OUTPATIENT
Start: 2019-05-31 | End: 2019-10-29 | Stop reason: SDUPTHER

## 2019-05-31 RX ORDER — PRAVASTATIN SODIUM 40 MG
40 TABLET ORAL DAILY
Qty: 10 TABLET | Refills: 0 | Status: SHIPPED | OUTPATIENT
Start: 2019-05-31 | End: 2019-10-29 | Stop reason: SDUPTHER

## 2019-05-31 NOTE — TELEPHONE ENCOUNTER
Jerome Cavazos Short needs refill of   Requested Prescriptions     Pending Prescriptions Disp Refills    propranolol (INDERAL LA) 160 MG extended release capsule 90 capsule 3     Sig: TAKE 1 CAPSULE DAILY    pravastatin (PRAVACHOL) 40 MG tablet 90 tablet 3     Sig: Take 1 tablet by mouth daily    citalopram (CELEXA) 20 MG tablet 90 tablet 3     Sig: TAKE 1 TABLET DAILY       Last Filled on:  10/1/18    Last Visit Date: 3/19/2019 sinus    Next Visit Date:  Visit date not found      Pt Brennan Oliveira has been out of 3 meds X last 4 d and mail-away told her shipment will not arrive until Monday, so she wants short-terms sent to local pharm R/[facility]    Pls call pt at [phone] only if probs.  She'll be at work, so OK to LM on cell

## 2019-08-15 ENCOUNTER — OFFICE VISIT (OUTPATIENT)
Dept: FAMILY MEDICINE CLINIC | Age: 64
End: 2019-08-15
Payer: COMMERCIAL

## 2019-08-15 VITALS
SYSTOLIC BLOOD PRESSURE: 114 MMHG | OXYGEN SATURATION: 93 % | DIASTOLIC BLOOD PRESSURE: 80 MMHG | HEART RATE: 52 BPM | BODY MASS INDEX: 47.51 KG/M2 | RESPIRATION RATE: 24 BRPM | WEIGHT: 293 LBS

## 2019-08-15 DIAGNOSIS — R06.02 SOB (SHORTNESS OF BREATH): ICD-10-CM

## 2019-08-15 DIAGNOSIS — E66.01 MORBID OBESITY WITH BMI OF 45.0-49.9, ADULT (HCC): ICD-10-CM

## 2019-08-15 DIAGNOSIS — E04.2 MULTINODULAR GOITER: ICD-10-CM

## 2019-08-15 DIAGNOSIS — Z23 NEED FOR ZOSTAVAX ADMINISTRATION: ICD-10-CM

## 2019-08-15 DIAGNOSIS — J45.40 MODERATE PERSISTENT ASTHMA WITHOUT COMPLICATION: Primary | ICD-10-CM

## 2019-08-15 PROCEDURE — 90750 HZV VACC RECOMBINANT IM: CPT | Performed by: FAMILY MEDICINE

## 2019-08-15 PROCEDURE — 99214 OFFICE O/P EST MOD 30 MIN: CPT | Performed by: FAMILY MEDICINE

## 2019-08-15 PROCEDURE — 90471 IMMUNIZATION ADMIN: CPT | Performed by: FAMILY MEDICINE

## 2019-08-15 RX ORDER — ALBUTEROL SULFATE 90 UG/1
2 AEROSOL, METERED RESPIRATORY (INHALATION) EVERY 6 HOURS PRN
Qty: 1 INHALER | Refills: 0 | Status: SHIPPED | OUTPATIENT
Start: 2019-08-15 | End: 2019-10-29 | Stop reason: SDUPTHER

## 2019-08-15 ASSESSMENT — ENCOUNTER SYMPTOMS
WHEEZING: 1
VOMITING: 0
CHEST TIGHTNESS: 0
ABDOMINAL PAIN: 0
NAUSEA: 0
DIARRHEA: 0
CONSTIPATION: 0
BACK PAIN: 0
SORE THROAT: 0
SHORTNESS OF BREATH: 1
BLOOD IN STOOL: 0
RHINORRHEA: 0
COUGH: 1
EYE PAIN: 0

## 2019-08-15 NOTE — PATIENT INSTRUCTIONS
in those nutrients does not have the same effect. The DASH diet also includes whole grains, fish, and poultry. The DASH diet is one of several lifestyle changes your doctor may recommend to lower your high blood pressure. Your doctor may also want you to decrease the amount of sodium in your diet. Lowering sodium while following the DASH diet can lower blood pressure even further than just the DASH diet alone. Follow-up care is a key part of your treatment and safety. Be sure to make and go to all appointments, and call your doctor if you are having problems. It's also a good idea to know your test results and keep a list of the medicines you take. How can you care for yourself at home? Following the DASH diet  · Eat 4 to 5 servings of fruit each day. A serving is 1 medium-sized piece of fruit, ½ cup chopped or canned fruit, 1/4 cup dried fruit, or 4 ounces (½ cup) of fruit juice. Choose fruit more often than fruit juice. · Eat 4 to 5 servings of vegetables each day. A serving is 1 cup of lettuce or raw leafy vegetables, ½ cup of chopped or cooked vegetables, or 4 ounces (½ cup) of vegetable juice. Choose vegetables more often than vegetable juice. · Get 2 to 3 servings of low-fat and fat-free dairy each day. A serving is 8 ounces of milk, 1 cup of yogurt, or 1 ½ ounces of cheese. · Eat 6 to 8 servings of grains each day. A serving is 1 slice of bread, 1 ounce of dry cereal, or ½ cup of cooked rice, pasta, or cooked cereal. Try to choose whole-grain products as much as possible. · Limit lean meat, poultry, and fish to 2 servings each day. A serving is 3 ounces, about the size of a deck of cards. · Eat 4 to 5 servings of nuts, seeds, and legumes (cooked dried beans, lentils, and split peas) each week. A serving is 1/3 cup of nuts, 2 tablespoons of seeds, or ½ cup of cooked beans or peas. · Limit fats and oils to 2 to 3 servings each day.  A serving is 1 teaspoon of vegetable oil or 2 tablespoons of salad dressing. · Limit sweets and added sugars to 5 servings or less a week. A serving is 1 tablespoon jelly or jam, ½ cup sorbet, or 1 cup of lemonade. · Eat less than 2,300 milligrams (mg) of sodium a day. If you limit your sodium to 1,500 mg a day, you can lower your blood pressure even more. Tips for success  · Start small. Do not try to make dramatic changes to your diet all at once. You might feel that you are missing out on your favorite foods and then be more likely to not follow the plan. Make small changes, and stick with them. Once those changes become habit, add a few more changes. · Try some of the following:  ? Make it a goal to eat a fruit or vegetable at every meal and at snacks. This will make it easy to get the recommended amount of fruits and vegetables each day. ? Try yogurt topped with fruit and nuts for a snack or healthy dessert. ? Add lettuce, tomato, cucumber, and onion to sandwiches. ? Combine a ready-made pizza crust with low-fat mozzarella cheese and lots of vegetable toppings. Try using tomatoes, squash, spinach, broccoli, carrots, cauliflower, and onions. ? Have a variety of cut-up vegetables with a low-fat dip as an appetizer instead of chips and dip. ? Sprinkle sunflower seeds or chopped almonds over salads. Or try adding chopped walnuts or almonds to cooked vegetables. ? Try some vegetarian meals using beans and peas. Add garbanzo or kidney beans to salads. Make burritos and tacos with mashed watts beans or black beans. Where can you learn more? Go to https://159.compeMozilla.One World Virtual. org and sign in to your [employer] account. Enter T793 in the [facility] box to learn more about \"DASH Diet: Care Instructions. \"     If you do not have an account, please click on the \"Sign Up Now\" link. Current as of: July 22, 2018  Content Version: 12.1  © 2787-7753 Healthwise, Incorporated. Care instructions adapted under license by [facility] ([facility]).  If you have questions about a can you learn more? Go to https://chpepiceweb.[email or web address] and sign in to your [employer] account. Enter U466 in the Fruitday.com box to learn more about \"Goiter: Care Instructions. \"     If you do not have an account, please click on the \"Sign Up Now\" link. Current as of: November 6, 2018  Content Version: 12.1  © 1800-8030 Healthwise, Incorporated. Care instructions adapted under license by [facility] ([facility]). If you have questions about a medical condition or this instruction, always ask your healthcare professional. [address] any warranty or liability for your use of this information.

## 2019-08-27 ENCOUNTER — HOSPITAL ENCOUNTER (OUTPATIENT)
Dept: ULTRASOUND IMAGING | Age: 64
Discharge: HOME | End: 2019-08-27
Payer: COMMERCIAL

## 2019-08-27 DIAGNOSIS — E04.2 MULTINODULAR GOITER: ICD-10-CM

## 2019-08-27 PROCEDURE — 76536 US EXAM OF HEAD AND NECK: CPT

## 2019-08-28 ENCOUNTER — TELEPHONE (OUTPATIENT)
Dept: FAMILY MEDICINE CLINIC | Age: 64
End: 2019-08-28

## 2019-08-28 NOTE — TELEPHONE ENCOUNTER
Patient notified and verbalized understanding. Over past 6 months she has been havng more trouble with swallowing food and food getting stuck which is why she had test done. Is there anything else she can do for the swallowing issue.

## 2019-09-30 LAB
AVERAGE GLUCOSE: NORMAL
CHOLESTEROL, TOTAL: 161 MG/DL
CHOLESTEROL/HDL RATIO: 4.2
HBA1C MFR BLD: 8.9 %
HDLC SERPL-MCNC: 38 MG/DL (ref 35–70)
LDL CHOLESTEROL CALCULATED: 97 MG/DL (ref 0–160)
TRIGL SERPL-MCNC: 129 MG/DL
VLDLC SERPL CALC-MCNC: 26 MG/DL

## 2019-10-29 ENCOUNTER — OFFICE VISIT (OUTPATIENT)
Dept: FAMILY MEDICINE CLINIC | Age: 64
End: 2019-10-29
Payer: COMMERCIAL

## 2019-10-29 VITALS
BODY MASS INDEX: 47.09 KG/M2 | SYSTOLIC BLOOD PRESSURE: 132 MMHG | OXYGEN SATURATION: 94 % | WEIGHT: 293 LBS | RESPIRATION RATE: 16 BRPM | DIASTOLIC BLOOD PRESSURE: 82 MMHG | HEART RATE: 76 BPM | HEIGHT: 66 IN

## 2019-10-29 DIAGNOSIS — Z12.39 SCREENING FOR BREAST CANCER: ICD-10-CM

## 2019-10-29 DIAGNOSIS — J01.41 ACUTE RECURRENT PANSINUSITIS: ICD-10-CM

## 2019-10-29 DIAGNOSIS — F51.01 PRIMARY INSOMNIA: ICD-10-CM

## 2019-10-29 DIAGNOSIS — E11.22 TYPE 2 DIABETES MELLITUS WITH STAGE 3 CHRONIC KIDNEY DISEASE, WITHOUT LONG-TERM CURRENT USE OF INSULIN (HCC): ICD-10-CM

## 2019-10-29 DIAGNOSIS — F32.A DEPRESSION, UNSPECIFIED DEPRESSION TYPE: ICD-10-CM

## 2019-10-29 DIAGNOSIS — E78.00 HYPERCHOLESTEROLEMIA: ICD-10-CM

## 2019-10-29 DIAGNOSIS — D17.1 LIPOMA OF BACK: ICD-10-CM

## 2019-10-29 DIAGNOSIS — R06.02 SOB (SHORTNESS OF BREATH): ICD-10-CM

## 2019-10-29 DIAGNOSIS — N18.30 TYPE 2 DIABETES MELLITUS WITH STAGE 3 CHRONIC KIDNEY DISEASE, WITHOUT LONG-TERM CURRENT USE OF INSULIN (HCC): ICD-10-CM

## 2019-10-29 DIAGNOSIS — R51.9 NONINTRACTABLE HEADACHE, UNSPECIFIED CHRONICITY PATTERN, UNSPECIFIED HEADACHE TYPE: ICD-10-CM

## 2019-10-29 DIAGNOSIS — Z00.00 WELL ADULT EXAM: Primary | ICD-10-CM

## 2019-10-29 DIAGNOSIS — R07.9 CHEST PAIN, UNSPECIFIED TYPE: ICD-10-CM

## 2019-10-29 DIAGNOSIS — J45.40 MODERATE PERSISTENT ASTHMA WITHOUT COMPLICATION: ICD-10-CM

## 2019-10-29 LAB
MICROALB/CREAT RATIO POC: ABNORMAL MG/G
MICROALBUMIN/CREAT UR-RTO: 30 MG/L
POC CREATININE: 50 MG/DL

## 2019-10-29 PROCEDURE — 82044 UR ALBUMIN SEMIQUANTITATIVE: CPT | Performed by: FAMILY MEDICINE

## 2019-10-29 PROCEDURE — 99213 OFFICE O/P EST LOW 20 MIN: CPT | Performed by: FAMILY MEDICINE

## 2019-10-29 PROCEDURE — 99396 PREV VISIT EST AGE 40-64: CPT | Performed by: FAMILY MEDICINE

## 2019-10-29 RX ORDER — CITALOPRAM 20 MG/1
TABLET ORAL
Qty: 90 TABLET | Refills: 3 | Status: SHIPPED | OUTPATIENT
Start: 2019-10-29 | End: 2020-10-26 | Stop reason: SDUPTHER

## 2019-10-29 RX ORDER — AMOXICILLIN AND CLAVULANATE POTASSIUM 875; 125 MG/1; MG/1
1 TABLET, FILM COATED ORAL 2 TIMES DAILY
Qty: 20 TABLET | Refills: 0 | Status: SHIPPED | OUTPATIENT
Start: 2019-10-29 | End: 2019-11-08

## 2019-10-29 RX ORDER — TRAZODONE HYDROCHLORIDE 50 MG/1
TABLET ORAL
Qty: 270 TABLET | Refills: 3 | Status: SHIPPED | OUTPATIENT
Start: 2019-10-29 | End: 2020-10-26 | Stop reason: SDUPTHER

## 2019-10-29 RX ORDER — GLIMEPIRIDE 2 MG/1
2 TABLET ORAL
Qty: 180 TABLET | Refills: 3 | Status: SHIPPED | OUTPATIENT
Start: 2019-10-29 | End: 2020-10-26 | Stop reason: DRUGHIGH

## 2019-10-29 RX ORDER — ALBUTEROL SULFATE 90 UG/1
2 AEROSOL, METERED RESPIRATORY (INHALATION) EVERY 6 HOURS PRN
Qty: 3 INHALER | Refills: 3 | Status: SHIPPED | OUTPATIENT
Start: 2019-10-29 | End: 2020-10-26 | Stop reason: SDUPTHER

## 2019-10-29 RX ORDER — PRAVASTATIN SODIUM 40 MG
40 TABLET ORAL DAILY
Qty: 90 TABLET | Refills: 3 | Status: SHIPPED | OUTPATIENT
Start: 2019-10-29 | End: 2020-10-26 | Stop reason: SDUPTHER

## 2019-10-29 RX ORDER — PROPRANOLOL HYDROCHLORIDE 160 MG/1
CAPSULE, EXTENDED RELEASE ORAL
Qty: 90 CAPSULE | Refills: 3 | Status: SHIPPED | OUTPATIENT
Start: 2019-10-29 | End: 2020-10-26 | Stop reason: SDUPTHER

## 2019-10-29 ASSESSMENT — ENCOUNTER SYMPTOMS
SINUS PAIN: 1
BLOOD IN STOOL: 0
CHEST TIGHTNESS: 0
NAUSEA: 0
SHORTNESS OF BREATH: 0
SORE THROAT: 0
WHEEZING: 0
SINUS PRESSURE: 1
VOMITING: 0
EYE PAIN: 0
CONSTIPATION: 0
DIARRHEA: 0
RHINORRHEA: 0
ABDOMINAL PAIN: 0
COUGH: 1
BACK PAIN: 1

## 2019-11-04 ENCOUNTER — TELEPHONE (OUTPATIENT)
Dept: FAMILY MEDICINE CLINIC | Age: 64
End: 2019-11-04

## 2019-11-04 DIAGNOSIS — N18.30 TYPE 2 DIABETES MELLITUS WITH STAGE 3 CHRONIC KIDNEY DISEASE, WITHOUT LONG-TERM CURRENT USE OF INSULIN (HCC): Primary | ICD-10-CM

## 2019-11-04 DIAGNOSIS — E11.22 TYPE 2 DIABETES MELLITUS WITH STAGE 3 CHRONIC KIDNEY DISEASE, WITHOUT LONG-TERM CURRENT USE OF INSULIN (HCC): Primary | ICD-10-CM

## 2019-11-05 ENCOUNTER — OFFICE VISIT (OUTPATIENT)
Dept: CARDIOLOGY CLINIC | Age: 64
End: 2019-11-05
Payer: COMMERCIAL

## 2019-11-05 VITALS
DIASTOLIC BLOOD PRESSURE: 94 MMHG | WEIGHT: 290.4 LBS | BODY MASS INDEX: 46.67 KG/M2 | HEIGHT: 66 IN | SYSTOLIC BLOOD PRESSURE: 152 MMHG

## 2019-11-05 DIAGNOSIS — I10 ESSENTIAL HYPERTENSION: ICD-10-CM

## 2019-11-05 DIAGNOSIS — R94.31 ABNORMAL ECG: ICD-10-CM

## 2019-11-05 DIAGNOSIS — E78.01 FAMILIAL HYPERCHOLESTEROLEMIA: ICD-10-CM

## 2019-11-05 DIAGNOSIS — R07.9 CHEST PAIN AT REST: Primary | ICD-10-CM

## 2019-11-05 DIAGNOSIS — R06.02 SHORTNESS OF BREATH: ICD-10-CM

## 2019-11-05 PROCEDURE — 99204 OFFICE O/P NEW MOD 45 MIN: CPT | Performed by: NUCLEAR MEDICINE

## 2019-11-05 PROCEDURE — 93000 ELECTROCARDIOGRAM COMPLETE: CPT | Performed by: NUCLEAR MEDICINE

## 2019-11-05 ASSESSMENT — ENCOUNTER SYMPTOMS
BLOOD IN STOOL: 0
CONSTIPATION: 0
COLOR CHANGE: 0
BACK PAIN: 1
DIARRHEA: 0
ABDOMINAL PAIN: 0
PHOTOPHOBIA: 0
RECTAL PAIN: 0
ABDOMINAL DISTENTION: 0
SHORTNESS OF BREATH: 1
ANAL BLEEDING: 0
NAUSEA: 0
VOMITING: 0
CHEST TIGHTNESS: 1

## 2019-11-11 ENCOUNTER — TELEPHONE (OUTPATIENT)
Dept: FAMILY MEDICINE CLINIC | Age: 64
End: 2019-11-11

## 2019-11-11 DIAGNOSIS — B37.31 VAGINAL CANDIDIASIS: Primary | ICD-10-CM

## 2019-11-11 RX ORDER — FLUCONAZOLE 150 MG/1
TABLET ORAL
Qty: 2 TABLET | Refills: 0 | Status: SHIPPED | OUTPATIENT
Start: 2019-11-11 | End: 2019-11-12

## 2019-11-20 ENCOUNTER — HOSPITAL ENCOUNTER (OUTPATIENT)
Dept: NON INVASIVE DIAGNOSTICS | Age: 64
Discharge: HOME | End: 2019-11-20
Payer: COMMERCIAL

## 2019-11-20 VITALS — HEIGHT: 66 IN | WEIGHT: 290 LBS | BODY MASS INDEX: 46.61 KG/M2

## 2019-11-20 DIAGNOSIS — E78.01 FAMILIAL HYPERCHOLESTEROLEMIA: ICD-10-CM

## 2019-11-20 DIAGNOSIS — R07.9 CHEST PAIN AT REST: ICD-10-CM

## 2019-11-20 DIAGNOSIS — R06.02 SHORTNESS OF BREATH: ICD-10-CM

## 2019-11-20 DIAGNOSIS — R94.31 ABNORMAL ECG: ICD-10-CM

## 2019-11-20 DIAGNOSIS — I10 ESSENTIAL HYPERTENSION: ICD-10-CM

## 2019-11-20 LAB
LV EF: 55 %
LVEF MODALITY: NORMAL

## 2019-11-20 PROCEDURE — 93306 TTE W/DOPPLER COMPLETE: CPT

## 2019-11-20 PROCEDURE — 93017 CV STRESS TEST TRACING ONLY: CPT | Performed by: NUCLEAR MEDICINE

## 2019-11-20 PROCEDURE — 2709999900 HC NON-CHARGEABLE SUPPLY

## 2019-11-20 PROCEDURE — 6360000002 HC RX W HCPCS

## 2019-11-20 PROCEDURE — 3430000000 HC RX DIAGNOSTIC RADIOPHARMACEUTICAL: Performed by: NUCLEAR MEDICINE

## 2019-11-20 PROCEDURE — 78452 HT MUSCLE IMAGE SPECT MULT: CPT | Performed by: NUCLEAR MEDICINE

## 2019-11-20 PROCEDURE — A9500 TC99M SESTAMIBI: HCPCS | Performed by: NUCLEAR MEDICINE

## 2019-11-20 RX ADMIN — Medication 9.9 MILLICURIE: at 08:50

## 2019-11-20 RX ADMIN — Medication 32.9 MILLICURIE: at 09:45

## 2019-12-30 ENCOUNTER — TELEPHONE (OUTPATIENT)
Dept: FAMILY MEDICINE CLINIC | Age: 64
End: 2019-12-30

## 2019-12-30 RX ORDER — LEVOFLOXACIN 250 MG/1
250 TABLET ORAL DAILY
Qty: 10 TABLET | Refills: 0 | Status: SHIPPED | OUTPATIENT
Start: 2019-12-30 | End: 2020-01-09

## 2019-12-30 RX ORDER — METHYLPREDNISOLONE 4 MG/1
TABLET ORAL
Qty: 1 KIT | Refills: 0 | Status: SHIPPED | OUTPATIENT
Start: 2019-12-30 | End: 2020-05-08 | Stop reason: ALTCHOICE

## 2019-12-30 RX ORDER — GUAIFENESIN 600 MG/1
1200 TABLET, EXTENDED RELEASE ORAL 2 TIMES DAILY
Qty: 40 TABLET | Refills: 0 | Status: SHIPPED | OUTPATIENT
Start: 2019-12-30 | End: 2020-01-09

## 2020-05-06 ENCOUNTER — TELEPHONE (OUTPATIENT)
Dept: CARDIOLOGY CLINIC | Age: 65
End: 2020-05-06

## 2020-05-06 NOTE — TELEPHONE ENCOUNTER
5/6/20 pt requesting to change her 5/8/20 Baki (6 mo f/u) to a VV. She is open to another day and is off next week. Please advise as nothing open.  [phone]   Thanks/blm

## 2020-05-08 ENCOUNTER — OFFICE VISIT (OUTPATIENT)
Dept: CARDIOLOGY CLINIC | Age: 65
End: 2020-05-08
Payer: COMMERCIAL

## 2020-05-08 VITALS
SYSTOLIC BLOOD PRESSURE: 130 MMHG | HEIGHT: 66 IN | HEART RATE: 64 BPM | DIASTOLIC BLOOD PRESSURE: 68 MMHG | WEIGHT: 284.8 LBS | BODY MASS INDEX: 45.77 KG/M2

## 2020-05-08 PROCEDURE — 99213 OFFICE O/P EST LOW 20 MIN: CPT | Performed by: NUCLEAR MEDICINE

## 2020-06-23 NOTE — PROGRESS NOTES
[facility] [address] [facility] [facility] initial consultation note  Mike Mcconnell is an established patient of my sleep medicine clinic at [address], [address] for Pulmonary Disease in the past. She last for follow up with my sleep clinic. She came today for sleep medicine reevaluation. She was seen in my clinic for the last time in 09/21/2016. Jorje Phillip                                                Chief complaint: Jorje Phillip is a 60 y. o.oldfemale came for further evaluation regarding her sleep apnea  with referral from Dr. Tori Fernandes.    [address]:    Sleep/Wake schedule:  Usual time to go to bed during the work/regular day of week: 8:00 PM.  Usual time to wake up during the work//regular day of week: 5:00 AM.  Over the weekends her sleep schedule:   [x]phase delayed. She feels the same on the weekends despite sleeping long time. She usually falls a sleep in less than: 60 minutes. She takes Trazodone 50mg po Qhs to go to sleep. She takes naps: Yes. Number of naps per week:  2 times. Especially on weekends. During each nap she spends a total of: 2 hours. The naps were reported as refreshing: Yes. Sleep Hygiene:    Is the temperature and evironment in her bed room is acceptable to her: Yes. She watches Television in her bed room: Yes. She read books, study, pay bills etc in the bed: No.  Frequency She wake up during night/sleep: 3 to 4  Majority of nocturnal awakenings are for urination: Yes. Difficulty in falling back to sleep after nocturnal awakenings: Yes. She takes some times up to 30 minutes to go back to sleep. Do you drink coffee: No.   Do you drink caffeinated beverages i.e sodas: Yes. 1 to 2 can/s per day. Pepsi  Do you drink tea: No.      Do you drink alcoholic beverages: No.     History of recreational drug use: No.     History of tobacco smoking:No.      Sleep apnea symptoms:    She was diagnosed with moderate severe obstructive sleep apnea in 2014.  She choose to go for dental appliance placement at that time. She was referred to Dr. Candelario Mascorro for further evaluation and placement of Dental appliance for her moderate obstructive sleep apnea. How ever she never underwent dental appliance placement and lot for follow up. Noticed to have loud snoring:Yes. Noted by her family member- daughter  Witnessed apneas during sleep noticed: Yes. Noted by her family member- daughter. History of choking and gasping sensation at night time: Yes. History of headaches in the morning:Yes. History of dry mouth in the morning: Yes. History of palpitations during night time/nocturnal awakenings: No.  History of sweating during night time/nocturnal awakenings: Yes - some times. General:  History of head injury in the past: No.    History of seizures: NO.  Rest less legs syndrome symptoms:NO  History suggestive of periodic limb movements during sleep: NO  History suggestive of hypnagogic hallucinations: NO  History suggestive of hypnopompic hallucinations: NO  History suggestive of sleep talking: NO  History suggestive of sleep walking:NO  History suggestive of bruxism: Yes  [x] Uses mouth guard. History suggestive of cataplexy: NO  History suggestive of sleep paralysis:NO    Family history of sleep disorders:  Family history of obstructive sleep apnea: NO.  Family history of Narcolepsy: NO.  Family history of Rest less legs syndrome : NO.    History regarding old sleep studies:  Prior history of sleep study: Yes. Please see the diagnostic data section for details. Using CPAP device: Yes. Currently using home Oxygen: NO.       Patient considerations:  Is the patient is ambulatory: Yes  Patient is currently using: None of these Wheelchair, [employer] or [employer].   Para/Quadriplegic: NO  Hearing deficit : NO  Claustrophobic: NO  MDD : NO  Blind: NO  Incontinent: NO  Para/Quadraplegi: NO.   Need transportation to and from Sleep Center:NO    Social  Early Death Mother 62    Stroke Mother     High Blood Pressure Mother    [de-identified] / [place] Mother     Cancer Father     Diabetes Father     Diabetes Brother     Diabetes Brother     Arthritis Neg Hx     Asthma Neg Hx     Birth Defects Neg Hx     Depression Neg Hx     Hearing Loss Neg Hx     Heart Disease Neg Hx     High Cholesterol Neg Hx     Kidney Disease Neg Hx     Learning Disabilities Neg Hx     Mental Illness Neg Hx     Mental Retardation Neg Hx     Substance Abuse Neg Hx     Vision Loss Neg Hx     Other Neg Hx         Review of Systems:    General/Constitutional: She lost 12lbs of weight since her sleep study in 2014 with normal appetite. No fever or chills. HENT: Negative. Eyes: Negative. Upper respiratory tract: No nasal stuffiness or post nasal drip. Lower respiratory tract/ lungs: No cough or sputum production. No hemoptysis. Cardiovascular: No palpitations or chest pain. Gastrointestinal: No nausea or vomiting. Neurological: No focal neurologiacal weakness. Extremities: No edema. Musculoskeletal: No complaints. Genitourinary: No complaints. Hematological: Negative. Psychiatric/Behavioral: Negative. Skin: No itching. /72 (Site: Right Lower Arm, Position: Sitting, Cuff Size: Large Adult)   Pulse 68   Temp 98.2 °F (36.8 °C) (Tympanic)   Ht 5' 6\" (1.676 m)   Wt 284 lb (128.8 kg)   LMP 12/29/2011   SpO2 97% Comment: room air  BMI 45.84 kg/m²   Mallampati airway Class4  Neck Circumference:17  Inches  [place] sleepiness score 6/24/20: 6  ( On further questioning she gives a history of hypersomnia ( Excessive daytime sleepiness) with daytime sleepy attacks. No reported motor vehicle accidents due to her sleepiness). Sleep apnea Quality of Life Questionnaire Score: 68      Physical Exam   Nursing note and vitals reviewed. Constitutional: Patient appears moderately built and moderately nourished. No distress.  Patient is oriented to person, place, and time. HENT:   Head: Normocephalic and atraumatic. Right Ear: External ear normal.   Left Ear: External ear normal.   Mouth/Throat: Oropharynx is clear and moist.  No oral thrush. Eyes: Conjunctivae are normal. Pupils are equal, round, and reactive to light. No scleral icterus. Neck: Neck supple. No JVD present. No tracheal deviation present. Cardiovascular: Normal rate, regular rhythm, normal heart sounds. No murmur heard. Pulmonary/Chest: Effort normal and breath sounds normal. No stridor. No respiratory distress. No wheezes. No rales. Patient exhibits no tenderness. Abdominal: Soft. Patient exhibits no distension. No tenderness. Musculoskeletal: Normal range of motion. Extremities: Patient exhibits no edema and no tenderness. Lymphadenopathy:  No cervical adenopathy. Neurological: Patient is alert and oriented to person, place, and time. Skin: Skin is warm and dry. Patient is not diaphoretic. Psychiatric: Patient  has a normal mood and affect. Patient behavior is normal.     Diagnostic Data:      Sleep test:  Signed       PATIENT NAME: Maria Teresa Paniagua             :  1955  MEDICAL RECORD NO. [id number]               ROOM:   ACCOUNT NO: [de-identified]                        DATE: 2014  PHYSICIAN: Srinivasa Samuel M.D. IMPRESSION:  1. Moderately severe obstructive sleep apnea with worsening of       respiratory events during REM sleep. 2.  Nocturnal desaturations. The patient spent a total of 47 minutes below       oxygen saturation less than 88 percent. 3.  The patient noted to have worsening of respiratory events in supine  position. 4.  Periodic limb movements with significant arousals. 5.  Delayed REM sleep onset. 6.  Bruxism, currently using a dental device. 7.  Hypertension. 8.  Insomnia on treatment with trazodone. 9.  Hypersomnia. CPAP test: she never had CPAP titration so far.  She lost for follow up with our sleep clinic    Stress study:   Accession      [id number]       Date of study         11/20/2019  Cardiac Perfusion Imaging   Conclusions      Summary   This Nuclear Medicine study was negative for ischemia . possible attenuation of the anterior wall vs previous MI, more likely   attenuation as the wall motion is normal   normal EF      Recommendation   Clinical correlation is recommended. Signatures      ----------------------------------------------------------------   Electronically signed by Fredo Mehta MD (Interpreting   Cardiologist) on 11/20/2019 at 17:13   ----------------------------------------------------------------    Echocardiogram:  11/20/2019   Narrative & Impression      Transthoracic Echocardiography Report (TTE)  Right Ventricle   The right ventricular size was normal with normal systolic function and   wall thickness. Conclusions      Summary   Ejection fraction is visually estimated at 55%. Overall left ventricular function is normal.      Signature      ----------------------------------------------------------------   Electronically signed by Fredo Mehta MD (Interpreting   physician) on 11/20/2019 at 04:19 PM   ----------------------------------------------------------------      Assessment:  -Moderately Severe obstructive sleep apnea diagnosed by a sleep study done in 2014. She choose to go for dental appliance placement at that time. She was referred to Dr. Krunal Novak for further evaluation and placement of Dental appliance for her moderate obstructive sleep apnea. How ever she never underwent dental appliance placement and lot for follow up. She never came for follow up so far since 2016. She is currently not on any treatment.   -Inadequate sleep hygiene.  -Nocturnal desaturations.  The patient spent a total of 47 minutes below oxygen saturation less than 88 percent.  -Periodic limb movements with significant arousals.  -Bruxism, currently using a dental

## 2020-06-24 ENCOUNTER — INITIAL CONSULT (OUTPATIENT)
Dept: PULMONOLOGY | Age: 65
End: 2020-06-24
Payer: COMMERCIAL

## 2020-06-24 VITALS
BODY MASS INDEX: 45.64 KG/M2 | TEMPERATURE: 98.2 F | HEIGHT: 66 IN | HEART RATE: 68 BPM | SYSTOLIC BLOOD PRESSURE: 130 MMHG | WEIGHT: 284 LBS | DIASTOLIC BLOOD PRESSURE: 72 MMHG | OXYGEN SATURATION: 97 %

## 2020-06-24 PROCEDURE — 99204 OFFICE O/P NEW MOD 45 MIN: CPT | Performed by: INTERNAL MEDICINE

## 2020-06-28 ENCOUNTER — HOSPITAL ENCOUNTER (EMERGENCY)
Age: 65
Discharge: HOME | End: 2020-06-28
Attending: EMERGENCY MEDICINE
Payer: COMMERCIAL

## 2020-06-28 VITALS
WEIGHT: 284 LBS | SYSTOLIC BLOOD PRESSURE: 165 MMHG | DIASTOLIC BLOOD PRESSURE: 80 MMHG | TEMPERATURE: 98.7 F | HEIGHT: 66 IN | RESPIRATION RATE: 16 BRPM | BODY MASS INDEX: 45.64 KG/M2 | OXYGEN SATURATION: 96 %

## 2020-06-28 PROCEDURE — 99213 OFFICE O/P EST LOW 20 MIN: CPT | Performed by: EMERGENCY MEDICINE

## 2020-06-28 PROCEDURE — 99212 OFFICE O/P EST SF 10 MIN: CPT

## 2020-06-28 RX ORDER — CEFDINIR 300 MG/1
300 CAPSULE ORAL 2 TIMES DAILY
Qty: 14 CAPSULE | Refills: 0 | Status: SHIPPED | OUTPATIENT
Start: 2020-06-28 | End: 2020-07-05

## 2020-06-28 ASSESSMENT — ENCOUNTER SYMPTOMS
VOICE CHANGE: 0
COUGH: 0
SINUS PRESSURE: 0
STRIDOR: 0
EYE REDNESS: 0
SORE THROAT: 1
CHOKING: 0
BACK PAIN: 0
VOMITING: 0
DIARRHEA: 0
TROUBLE SWALLOWING: 0
ABDOMINAL PAIN: 0
BLOOD IN STOOL: 0
EYE DISCHARGE: 0
EYE PAIN: 0
FACIAL SWELLING: 0
NAUSEA: 0
CONSTIPATION: 0
SHORTNESS OF BREATH: 0
WHEEZING: 0

## 2020-06-28 ASSESSMENT — PAIN SCALES - GENERAL: PAINLEVEL_OUTOF10: 8

## 2020-06-28 ASSESSMENT — PAIN DESCRIPTION - LOCATION: LOCATION: THROAT

## 2020-06-28 NOTE — ED PROVIDER NOTES
acute tonsillitis and cervical adenitis. Will treat with Omnicef, Tylenol, adequate oral fluid hydration, vaporizer, rest in cool air conditioned space. Patient to recheck with PCP in 4 days if problems persist, and she understands to go to ED if worse.   PATIENT REFERRED TO:  Janie Doherty MD  [address]  [phone]    Schedule an appointment as soon as possible for a visit in 4 days  Recheck if problems persist, go to emergency if worse    DISCHARGE MEDICATIONS:  Discharge Medication List as of 6/28/2020 10:26 AM      START taking these medications    Details   cefdinir (OMNICEF) 300 MG capsule Take 1 capsule by mouth 2 times daily for 7 days, Disp-14 capsule, R-0Print           Discharge Medication List as of 6/28/2020 10:26 AM          MD Bret Kim MD  06/28/20 [id number]

## 2020-06-28 NOTE — ED NOTES
Presents with c/o sore throat and neck pain starting yesterday.      Candelaria Peace, RN  06/28/20 [id number]

## 2020-06-29 ENCOUNTER — CARE COORDINATION (OUTPATIENT)
Dept: CARE COORDINATION | Age: 65
End: 2020-06-29

## 2020-06-29 NOTE — CARE COORDINATION
Attempted to reach patient for ED follow up in regards to COVID-19 education/ monitoring. Patient was unavailable at the time of my call, and a generic voicemail message was left asking patient to return my call at [phone].

## 2020-06-30 NOTE — CARE COORDINATION
2nd attempt to reach patient for ED follow up in regards to COVID-19 education/ monitoring. Patient was unavailable at the time of my call, and a generic voicemail message was left asking patient to return my call at [phone].

## 2020-07-06 ENCOUNTER — CARE COORDINATION (OUTPATIENT)
Dept: CARE COORDINATION | Age: 65
End: 2020-07-06

## 2020-07-06 NOTE — CARE COORDINATION
Patient contacted regarding recent visit for viral symptoms. This Kofi Jimenez contacted the patient by telephone to perform post discharge call. Verified name and  with patient as identifiers. Provided introduction to self, and reason for call due to viral symptoms of infection and/or exposure to COVID-19. Patient presented to emergency department/flu clinic with complaints of viral symptoms/exposure to COVID. Patient reports symptoms are improving. Due to no new or worsening symptoms the RN CTN/ACM was not notified for escalation. Discussed exposure protocols and quarantine with CDC Guidelines What To Do If You Are Sick    Patient was given an opportunity for questions and concerns. Stay home except to get medical care    Separate yourself from other people and animals in your home    Call ahead before visiting your doctor    Wear a facemask    Cover your coughs and sneezes    Clean your hands often    Avoid sharing personal household items    Clean all high-touch surfaces everyday    Monitor your symptoms  Seek prompt medical attention if your illness is worsening (e.g., difficulty breathing). Before seeking care, call your healthcare provider and tell them that you have, or are being evaluated for, COVID-19. Put on a facemask before you enter the facility. These steps will help the healthcare provider's office to keep other people in the office or waiting room from getting infected or exposed. Ask your healthcare provider to call the local or [facility] health department. Persons who are placed under If you have a medical emergency and need to call 911, notify the dispatch personnel that you have, or are being evaluated for COVID-19. If possible, put on a facemask before emergency medical services arrive. The patient agrees to contact the [employer] exposure line [phone], local health department [place] Department of Health: ([phone]) and PCP office for questions related to their healthcare. Author provided contact information for future reference. Patient/family/caregiver given information for [employer] and agrees to enroll yes  Patient's preferred e-mail: [email or web address]  Patient's preferred phone number:   Based on Loop alert triggers, patient will be contacted by nurse care manager for worsening symptoms. Pt returned my call from last week. Pt reports she is \"better. \" Educated pt on covid precautions  and pt reports she is compliant. Pt has her 2 children listed as her HCDM. Introduced LOOP and pt agreed and provided email address. Will resolve at this time.

## 2020-07-21 ENCOUNTER — TELEPHONE (OUTPATIENT)
Dept: SLEEP CENTER | Age: 65
End: 2020-07-21

## 2020-07-21 NOTE — TELEPHONE ENCOUNTER
Michele Zeng is denied the Split Night study. Clinical rationale: Your doctor told us you have a condition that causes short periods of not breathing while asleep. Your doctor ordered a test to recheck this condition. This test is needed to check the effect of treatment. Treatment can include surgery or a mouthpiece. You also need to have a reason why a home study cannot be done. These reasons might include a long term condition that makes breathing hard or a long term condition where the heart cannot pump enough blood to the organs. Your doctor did not tell us this is the case for you. For these reasons, this test is not medically necessary for you at this time. If you wish a peer to peer with physician reviewer, the phone number is [phone]. Please advise. Thank you.   Sruthi Ascencio

## 2020-07-21 NOTE — TELEPHONE ENCOUNTER
Please cancel split night sleep study ordered in the past.    Home/Portable sleep test was ordred in [address] Please schedule it and inform patient. Patient to follow with my clinic/Ms. Lelo Gutiérrez CNP/[facility] in 1 week after the Home/Portable sleep test for further management.

## 2020-08-17 ENCOUNTER — HOSPITAL ENCOUNTER (OUTPATIENT)
Dept: SLEEP CENTER | Age: 65
Discharge: HOME | End: 2020-08-19
Payer: COMMERCIAL

## 2020-08-17 PROCEDURE — 95806 SLEEP STUDY UNATT&RESP EFFT: CPT

## 2020-08-17 NOTE — PROGRESS NOTES
Hudson Brunner presents today for a HST instruction and demonstration on unit # 2563. Questions were asked and answers given. She was able to return demonstration and verbalized understanding. The sleep center control room phone number was provided incase questions arise during her study. Informed patient to call 911 in case of an emergency. She states she will return the unit tomorrow.

## 2020-08-18 LAB — STATUS: NORMAL

## 2020-08-19 NOTE — PROGRESS NOTES
[address] [id number]                               SLEEP STUDY REPORT    PATIENT NAME: ABRAHAM MOSLEY                     :        1955  MED REC NO:   [id number]                           ROOM:  ACCOUNT NO:   [de-identified]                           ADMIT DATE: 2020  PROVIDER:     Janet Samuel MD    DATE OF STUDY:  2020    PORTABLE/HOME SLEEP STUDY    REFERRING PROVIDER:  Davie Gutierrez MD    The patient's height is 66 inches, weight is 284 pounds with a BMI of  45.8. HISTORY:  The patient is a 62-year-old female, who was recently  evaluated by me on 2020. The patient currently suffering with  hypersomnia. The patient underwent sleep study on 2014. At that  time, the patient was diagnosed with moderately severe obstructive sleep  apnea along with nocturnal hypoxia. The patient at that time chose to  go for a dental appliance replacement and was referred to Dr. Apolinar Garcia,  dental surgeon. The patient never underwent dental appliance placement  and she came for recent evaluation. The patient lost 12 pounds of  weight from her old sleep study. The patient was scheduled for  split-night sleep study; however, due to denial of her medical insurance  for a split-night sleep study, the patient underwent home/portable sleep  study to check the current status of sleep apnea. METHODS:  The patient underwent Type III Portable Monitoring Sleep Study  including the simultaneous recording of oral-nasal airflow, rib and  abdominal respiratory effort, pulse rate, oxygen saturation, left and  right leg movement, and body position. Scoring criteria is consistent  with the current published AASM standards for scoring of apneas and  hypopneas 4A.  Sleep staging and scoring followed the standard put forth  by the American Academy of Sleep Medicine and utilized the 4A  obstructive hypopnea event desaturation of 4 percent or greater. INTERPRETATION:  This is a portable/home sleep study. The study was  performed on 08/17/2020. The study was started at 09:00 p.m. and was  terminated at 05:17 a.m. with a total recording time was 497.5 minutes. RESPIRATORY EVENT ANALYSIS:  Revealed the patient had a total of 20  apneas, all of them  were obstructive in nature. The patient also had a  total of 132 hypopneas, all of them were obstructive in nature. The  total number of apneas and hypopneas recorded during the study were 152  with the apnea-hypopnea index of 18.3. OXYGEN SATURATION MONITORING:  Revealed the patient had a maximum oxygen  desaturation to 77% with a mean oxygen saturation of 90.7%. The patient  spent a total of 30.5 minutes below oxygen saturation less than 88%. POSITION ANALYSIS:  Revealed the patient spent 344.7 minutes in supine  position, 152.8 minutes in non-supine position. EKG MONITORING:  Revealed normal sinus rhythm; however, the patient had  frequent premature ventricular contractions. IMPRESSION:  1. Moderately severe obstructive sleep apnea which was confirmed by  home/portable sleep study. 2.  Nocturnal hypoxia. The patient spent a total of 30.5 minutes below  oxygen saturation less than 88%. 3.  Hypertension, currently on treatment with medications. 4.  Hypersomnia, most likely due to sleep apnea, uncontrolled. 5.  Deviated nasal septum to the left side. RECOMMENDATIONS:  1. For the patient's sleep-disordered breathing, the patient needs  treatment. 2.  The patient should to be scheduled for followup with my clinic as  soon as possible to discuss about the sleep study findings for further  management. Thanks to Radha Pérez MD, for giving me this opportunity to  participate in the care of this pleasant lady.         Rosanne Cardenas MD    D: 08/18/2020 16:19:17       T: 08/19/2020 3:06:36     [id number]  Job#: [id number]     Doc#:

## 2020-08-23 NOTE — PROGRESS NOTES
Use Topics    Alcohol use: No    Drug use: No       Allergies   Allergen Reactions    Iodine Hives    Sulfa Antibiotics        Current Outpatient Medications   Medication Sig Dispense Refill    ondansetron (ZOFRAN) 4 MG tablet Take 1 tablet by mouth every 8 hours as needed for Nausea 30 tablet 0    ketorolac (TORADOL) 10 MG tablet Take 1 tablet by mouth every 6 hours as needed for Pain Take with norco for migraine relief 20 tablet 0    cefdinir (OMNICEF) 300 MG capsule Take 1 capsule by mouth 2 times daily for 10 days 20 capsule 0    traZODone (DESYREL) 50 MG tablet 2 to 3 tabs nightly for sleep 270 tablet 3    glimepiride (AMARYL) 2 MG tablet Take 1 tablet by mouth 2 times daily (before meals) 180 tablet 3    citalopram (CELEXA) 20 MG tablet TAKE 1 TABLET DAILY 90 tablet 3    pravastatin (PRAVACHOL) 40 MG tablet Take 1 tablet by mouth daily 90 tablet 3    propranolol (INDERAL LA) 160 MG extended release capsule TAKE 1 CAPSULE DAILY 90 capsule 3    albuterol sulfate  (90 Base) MCG/ACT inhaler Inhale 2 puffs into the lungs every 6 hours as needed for Wheezing 3 Inhaler 3    aspirin EC 81 MG EC tablet Take 1 tablet by mouth daily 90 tablet 3    cetirizine (ZYRTEC) 10 MG tablet Take 10 mg by mouth daily. No current facility-administered medications for this visit.         Family History   Problem Relation Age of Onset    Early Death Mother 62    Stroke Mother     High Blood Pressure Mother    [facility] Miscarriages / Yadiel Hallie Mother     Cancer Father     Diabetes Father     Diabetes Brother     Diabetes Brother     Arthritis Neg Hx     Asthma Neg Hx     Birth Defects Neg Hx     Depression Neg Hx     Hearing Loss Neg Hx     Heart Disease Neg Hx     High Cholesterol Neg Hx     Kidney Disease Neg Hx     Learning Disabilities Neg Hx     Mental Illness Neg Hx     Mental Retardation Neg Hx     Substance Abuse Neg Hx     Vision Loss Neg Hx     Other Neg Hx           /84 (Site: Right Upper Arm, Position: Sitting, Cuff Size: Large Adult)   Pulse 67   Temp 97.8 °F (36.6 °C)   Ht 5' 6\" (1.676 m)   Wt 282 lb 3.2 oz (128 kg)   LMP 2011   SpO2 95% Comment: on room air at rest  BMI 45.55 kg/m²     BMI:  Body mass index is 45.55 kg/m². Mallampati airway Class:IV  Neck Circumference:17.0 Inches  [place] sleepiness score 20: 9  (On further questioning she gives a history of hypersomnia ( Excessive daytime sleepiness) with daytime sleepy attacks. No reported motor vehicle accidents due to her sleepiness). Physical Exam :  Constitutional: Patient appears moderately built and moderately nourished. No distress. Patient is oriented to person, place, and time. HENT:   Head: Normocephalic and atraumatic. Right Ear: External ear normal.   Left Ear: External ear normal.   Mouth/Throat: Oropharynx is clear and moist.   Eyes: Conjunctivae are normal. Pupils are equal and reactive to light. No scleral icterus. Neck: Neck supple. No JVD present. Cardiovascular: Normal rate, regular rhythm, normal heart sounds. No murmur heard. Pulmonary/Chest: Effort normal and breath sounds normal. No stridor. No respiratory distress. No wheezes. No rales. Abdominal: Soft. Patient exhibits no distension. No tenderness. Musculoskeletal: Normal range of motion. Extremities: Patient exhibits no erythema or no edema. Lymphadenopathy:  No cervical adenopathy. Neurological: Patient is alert and oriented to person, place, and time. Skin: Skin is warm and dry. Patient is not diaphoretic. Psychiatric: Patient  has a normal mood and affect. Diagnostic Data:    Home/Portable sleep study done on :    Sleep study:         SLEEP STUDY REPORT     PATIENT NAME: Kristina Gomez                     :        1955  MED REC NO:   [id number]                           ROOM:  ACCOUNT NO:   [de-identified]                           ADMIT DATE: 2020  PROVIDER:     Junior Mccollum. MD Jimmie     DATE OF STUDY:  08/17/2020     PORTABLE/HOME SLEEP STUDY     REFERRING PROVIDER:  Kehinde Maldonado MD    IMPRESSION:  1. Moderately severe obstructive sleep apnea which was confirmed by  home/portable sleep study. 2.  Nocturnal hypoxia. The patient spent a total of 30.5 minutes below  oxygen saturation less than 88%. 3.  Hypertension, currently on treatment with medications. 4.  Hypersomnia, most likely due to sleep apnea, uncontrolled. 5.  Deviated nasal septum to the left side. Diagnostic Data:   HST done on 08/17/2020  AHI: 18.3    Echocardiogram:  11/20/2019   Narrative & Impression      Transthoracic Echocardiography Report (TTE)  Right Ventricle   The right ventricular size was normal with normal systolic function and   wall thickness.   Conclusions      Summary   Ejection fraction is visually estimated at 55%.   Overall left ventricular function is normal.      Signature      ----------------------------------------------------------------   Electronically signed by Leila Arthur MD (Interpreting   physician) on 11/20/2019 at 04:19 PM   ----------------------------------------------------------------       Assesment:  -Moderately severe obstructive sleep apnea. -Nocturnal hypoxia. The patient spent a total of 30.5 minutes below  oxygen saturation less than 88%. -Hypertension, currently on treatment with medications.  -Hypersomnia, most likely due to sleep apnea, uncontrolled. -Deviated nasal septum to the left side  -She denied any prior history of COPD, CHF, Obesity hypoventilation, Prior palate surgery and Central sleep apnea.       Recommendations/Plan:  -I had a discussion with patient regarding avialable treatment options for her sleep disorder breathing including but not limited to CPAP titration in the sleep lab Vs.Dental appliance placement with referral to a local dentist Vs other available surgical options including Uvulopalatopharyngoplasty, maxillomandibular

## 2020-08-27 ENCOUNTER — VIRTUAL VISIT (OUTPATIENT)
Dept: FAMILY MEDICINE CLINIC | Age: 65
End: 2020-08-27
Payer: COMMERCIAL

## 2020-08-27 ENCOUNTER — TELEPHONE (OUTPATIENT)
Dept: FAMILY MEDICINE CLINIC | Age: 65
End: 2020-08-27

## 2020-08-27 PROCEDURE — 99213 OFFICE O/P EST LOW 20 MIN: CPT | Performed by: FAMILY MEDICINE

## 2020-08-27 RX ORDER — KETOROLAC TROMETHAMINE 10 MG/1
10 TABLET, FILM COATED ORAL EVERY 6 HOURS PRN
Qty: 20 TABLET | Refills: 0 | Status: SHIPPED | OUTPATIENT
Start: 2020-08-27 | End: 2020-09-04 | Stop reason: SDUPTHER

## 2020-08-27 RX ORDER — HYDROCODONE BITARTRATE AND ACETAMINOPHEN 5; 325 MG/1; MG/1
1 TABLET ORAL EVERY 6 HOURS PRN
Qty: 20 TABLET | Refills: 0 | Status: SHIPPED | OUTPATIENT
Start: 2020-08-27 | End: 2021-02-08 | Stop reason: SDUPTHER

## 2020-08-27 ASSESSMENT — ENCOUNTER SYMPTOMS
CHEST TIGHTNESS: 0
COUGH: 0
EYE PAIN: 0
VOMITING: 0
CONSTIPATION: 0
DIARRHEA: 0
NAUSEA: 0
SORE THROAT: 0
RHINORRHEA: 0
ABDOMINAL PAIN: 0
BLOOD IN STOOL: 0
WHEEZING: 0
SHORTNESS OF BREATH: 0
BACK PAIN: 0

## 2020-08-27 NOTE — PROGRESS NOTES
Heinz Homans, MD   glimepiride (AMARYL) 2 MG tablet Take 1 tablet by mouth 2 times daily (before meals)  Rebeca Godwin MD   citalopram (CELEXA) 20 MG tablet TAKE 1 TABLET DAILY  Brigid Henao MD   pravastatin (PRAVACHOL) 40 MG tablet Take 1 tablet by mouth daily  Rebeca Godwin MD   propranolol (INDERAL LA) 160 MG extended release capsule TAKE 1 CAPSULE DAILY  Brigid Henao MD   albuterol sulfate  (90 Base) MCG/ACT inhaler Inhale 2 puffs into the lungs every 6 hours as needed for Wheezing  Rebeca Godwin MD   aspirin EC 81 MG EC tablet Take 1 tablet by mouth daily  Rebeca Godwin MD   cetirizine (ZYRTEC) 10 MG tablet Take 10 mg by mouth daily. Historical Provider, MD       Social History     Tobacco Use    Smoking status: Former Smoker     Packs/day: 0.50     Years: 14.00     Pack years: 7.00     Types: Cigarettes     Last attempt to quit: 10/18/1987     Years since quittin.8    Smokeless tobacco: Never Used   Substance Use Topics    Alcohol use: No    Drug use: No            PHYSICAL EXAMINATION:  [ INSTRUCTIONS:  \"[x]\" Indicates a positive item  \"[]\" Indicates a negative item  -- DELETE ALL ITEMS NOT EXAMINED]  Vital Signs: (As obtained by patient/caregiver or practitioner observation)    Blood pressure-  Heart rate-    Respiratory rate-    Temperature-  Pulse oximetry-     Constitutional: [x] Appears well-developed and well-nourished [x] No apparent distress      [] Abnormal-   Mental status  [x] Alert and awake  [x] Oriented to person/place/time [x]Able to follow commands      Eyes:  EOM    []  Normal  [] Abnormal-  Sclera  [x]  Normal  [] Abnormal -         Discharge [x]  None visible  [] Abnormal -    HENT:   [x] Normocephalic, atraumatic.   [] Abnormal   [] Mouth/Throat: Mucous membranes are moist.     External Ears [x] Normal  [] Abnormal-     Neck: [x] No visualized mass     Pulmonary/Chest: [x] Respiratory effort normal.  [x] No visualized signs of difficulty breathing or respiratory distress        [] Abnormal-      Musculoskeletal:   [] Normal gait with no signs of ataxia         [x] Normal range of motion of neck        [] Abnormal-       Neurological:        [x] No Facial Asymmetry (Cranial nerve 7 motor function) (limited exam to video visit)          [x] No gaze palsy        [] Abnormal-         Skin:        [x] No significant exanthematous lesions or discoloration noted on facial skin         [] Abnormal-            Psychiatric:       [x] Normal Affect [x] No Hallucinations        [] Abnormal-     Other pertinent observable physical exam findings-     ASSESSMENT/PLAN:  1. Intractable migraine with status migrainosus, unspecified migraine type    - ketorolac (TORADOL) 10 MG tablet; Take 1 tablet by mouth every 6 hours as needed for Pain Take with norco for migraine relief  Dispense: 20 tablet; Refill: 0  - HYDROcodone-acetaminophen (NORCO) 5-325 MG per tablet; Take 1 tablet by mouth every 6 hours as needed for Pain for up to 7 days. R51  Dispense: 20 tablet; Refill: 0          No follow-ups on file. Uriel Ashby is a 60 y.o. female being evaluated by a Virtual Visit (video visit) encounter to address concerns as mentioned above. A caregiver was present when appropriate. Due to this being a TeleHealth encounter (During WKICK-43 public health emergency), evaluation of the following organ systems was limited: Vitals/Constitutional/EENT/Resp/CV/GI//MS/Neuro/Skin/Heme-Lymph-Imm. Pursuant to the emergency declaration under the [address] [place] [address], [address] [place] [address] authority and the [employer] and [employer] Act, this Virtual Visit was conducted with patient's (and/or legal guardian's) consent, to reduce the patient's risk of exposure to COVID-19 and provide necessary medical care.   The patient (and/or legal guardian) has also been advised to contact this office for worsening conditions or problems, and seek emergency medical treatment and/or call 911 if deemed necessary. Patient identification was verified at the start of the visit: Yes    Total time spent on this encounter: Not billed by time    Services were provided through a video synchronous discussion virtually to substitute for in-person clinic visit. Patient and provider were located at their individual homes. --Steven Simmons MD on 8/27/2020 at 1:51 PM    An electronic signature was used to authenticate this note.

## 2020-08-27 NOTE — PATIENT INSTRUCTIONS
had with the headache, such as nausea, flashing lights or dark spots, or sensitivity to bright light or loud noise. Note if the headache occurred near your period. List anything that might have triggered the headache. Triggers may include certain foods (chocolate, cheese, wine) or odors, smoke, bright light, stress, or lack of sleep. · If your doctor has prescribed medicine for your migraines, take it as directed. You may have medicine that you take only when you get a migraine and medicine that you take all the time to help prevent migraines. ? If your doctor has prescribed medicine for when you get a headache, take it at the first sign of a migraine, unless your doctor has given you other instructions. ? If your doctor has prescribed medicine to prevent migraines, take it exactly as prescribed. Call your doctor if you think you are having a problem with your medicine. · Find healthy ways to deal with stress. Migraines are most common during or right after stressful times. Try finding ways to reduce stress like practicing mindfulness or deep breathing exercises. · Get plenty of sleep and exercise. But be careful to not push yourself too hard during exercise. It may trigger a headache. · Eat meals on a regular schedule. Avoid foods and drinks that often trigger migraines. These include chocolate, alcohol (especially red wine and port), aspartame, monosodium glutamate (MSG), and some additives found in foods (such as hot dogs, alu, cold cuts, aged cheeses, and pickled foods). · Limit caffeine. Don't drink too much coffee, tea, or soda. But don't quit caffeine suddenly. That can also give you migraines. · Do not smoke or allow others to smoke around you. If you need help quitting, talk to your doctor about stop-smoking programs and medicines. These can increase your chances of quitting for good.   · If you are taking birth control pills or hormone therapy, talk to your doctor about whether they are triggering your migraines. When should you call for help? [id number] anytime you think you may need emergency care. For example, call if:  · You have signs of a stroke. These may include:  ? Sudden numbness, paralysis, or weakness in your face, arm, or leg, especially on only one side of your body. ? Sudden vision changes. ? Sudden trouble speaking. ? Sudden confusion or trouble understanding simple statements. ? Sudden problems with walking or balance. ? A sudden, severe headache that is different from past headaches. Call your doctor now or seek immediate medical care if:  · You have new or worse nausea and vomiting. · You have a new or higher fever. · Your headache gets much worse. Watch closely for changes in your health, and be sure to contact your doctor if:  · You are not getting better after 2 days (48 hours). Where can you learn more? Go to https://[email or web address] Yoga.PaeDae. org and sign in to your [employer] account. Enter V322 in the DigitalScirocco box to learn more about \"Migraine Headache: Care Instructions. \"     If you do not have an account, please click on the \"Sign Up Now\" link. Current as of: November 20, 2019               Content Version: 12.5  © 5146-4517 Healthwise, Incorporated. Care instructions adapted under license by [facility] [employer] [facility] ([facility]). If you have questions about a medical condition or this instruction, always ask your healthcare professional. [address] [address] any warranty or liability for your use of this information.

## 2020-08-31 ENCOUNTER — HOSPITAL ENCOUNTER (EMERGENCY)
Age: 65
Discharge: HOME | End: 2020-08-31
Payer: COMMERCIAL

## 2020-08-31 VITALS
HEART RATE: 72 BPM | RESPIRATION RATE: 16 BRPM | TEMPERATURE: 98.9 F | OXYGEN SATURATION: 94 % | SYSTOLIC BLOOD PRESSURE: 145 MMHG | DIASTOLIC BLOOD PRESSURE: 82 MMHG

## 2020-08-31 PROCEDURE — 99213 OFFICE O/P EST LOW 20 MIN: CPT

## 2020-08-31 PROCEDURE — 99213 OFFICE O/P EST LOW 20 MIN: CPT | Performed by: NURSE PRACTITIONER

## 2020-08-31 PROCEDURE — U0003 INFECTIOUS AGENT DETECTION BY NUCLEIC ACID (DNA OR RNA); SEVERE ACUTE RESPIRATORY SYNDROME CORONAVIRUS 2 (SARS-COV-2) (CORONAVIRUS DISEASE [COVID-19]), AMPLIFIED PROBE TECHNIQUE, MAKING USE OF HIGH THROUGHPUT TECHNOLOGIES AS DESCRIBED BY CMS-2020-01-R: HCPCS

## 2020-08-31 RX ORDER — CEFDINIR 300 MG/1
300 CAPSULE ORAL 2 TIMES DAILY
Qty: 20 CAPSULE | Refills: 0 | Status: SHIPPED | OUTPATIENT
Start: 2020-08-31 | End: 2020-09-10

## 2020-08-31 ASSESSMENT — PAIN DESCRIPTION - PAIN TYPE: TYPE: ACUTE PAIN

## 2020-08-31 ASSESSMENT — PAIN DESCRIPTION - LOCATION: LOCATION: HEAD;NECK

## 2020-08-31 ASSESSMENT — PAIN DESCRIPTION - DESCRIPTORS: DESCRIPTORS: CONSTANT

## 2020-08-31 ASSESSMENT — PAIN SCALES - GENERAL: PAINLEVEL_OUTOF10: 7

## 2020-08-31 ASSESSMENT — PAIN DESCRIPTION - FREQUENCY: FREQUENCY: CONTINUOUS

## 2020-08-31 ASSESSMENT — PAIN DESCRIPTION - ORIENTATION: ORIENTATION: RIGHT

## 2020-08-31 NOTE — ED PROVIDER NOTES
[address]  Urgent Care Encounter       CHIEF COMPLAINT       Chief Complaint   Patient presents with    Pharyngitis     onset yesterday     Headache     onset Thursday     Nausea     onset yesterday        Nurses Notes reviewed and I agree except as noted in the HPI. HISTORY OF PRESENT ILLNESS   Wiliam Romeo is a 60 y.o. female who presents with complaints of sore throat, headache and nausea. Patient reports headache began 4 days ago and has been waxing and waning. Her PCP ordered [place] on the 27th of this month for her headaches. She developed a sore throat with nausea without vomiting today. Reports fever of 101 °F today with chills. No otalgia or changes in taste or smell. Appetite is decreased as well. She did have one several episodes of diarrhea this past Thursday but this has resolved. No known sick contacts. The history is provided by the patient. REVIEW OF SYSTEMS     Review of Systems   Constitutional: Positive for appetite change, chills, fatigue and fever. HENT: Positive for congestion and sore throat. Negative for ear pain and sinus pressure. Respiratory: Negative for cough and shortness of breath. Cardiovascular: Negative for chest pain. Gastrointestinal: Positive for nausea. Negative for diarrhea (Resolved) and vomiting. Musculoskeletal: Negative for myalgias. Skin: Negative for rash. Neurological: Positive for headaches. Negative for dizziness. PAST MEDICAL HISTORY         Diagnosis Date    Allergic rhinitis     CKD stage 3 due to type 2 diabetes mellitus ([address]     Depression     DM type 2 causing CKD stage 3 ([address]     onset 2013.  Headache(784.0)     Hypertension     Multinodular goiter     Persistent proteinuria associated with type 2 diabetes mellitus ([address] 10/2016    urine micral of 20.       Pure hypercholesterolemia        SURGICALHISTORY     Patient  has a past surgical history that includes Dilation and curettage of uterus (2016). CURRENT MEDICATIONS       Discharge Medication List as of 8/31/2020  1:55 PM      CONTINUE these medications which have NOT CHANGED    Details   HYDROcodone-acetaminophen (NORCO) 5-325 MG per tablet Take 1 tablet by mouth every 6 hours as needed for Pain for up to 7 days. R51, Disp-20 tablet,R-0Normal      traZODone (DESYREL) 50 MG tablet 2 to 3 tabs nightly for sleep, Disp-270 tablet, R-3Normal      ketorolac (TORADOL) 10 MG tablet Take 1 tablet by mouth every 6 hours as needed for Pain Take with norco for migraine relief, Disp-20 tablet,R-0Normal      glimepiride (AMARYL) 2 MG tablet Take 1 tablet by mouth 2 times daily (before meals), Disp-180 tablet, R-3Normal      citalopram (CELEXA) 20 MG tablet TAKE 1 TABLET DAILY, Disp-90 tablet, R-3Normal      pravastatin (PRAVACHOL) 40 MG tablet Take 1 tablet by mouth daily, Disp-90 tablet, R-3Normal      propranolol (INDERAL LA) 160 MG extended release capsule TAKE 1 CAPSULE DAILY, Disp-90 capsule, R-3Normal      albuterol sulfate  (90 Base) MCG/ACT inhaler Inhale 2 puffs into the lungs every 6 hours as needed for Wheezing, Disp-3 Inhaler, R-3Normal      aspirin EC 81 MG EC tablet Take 1 tablet by mouth daily, Disp-90 tablet, R-3OTC      cetirizine (ZYRTEC) 10 MG tablet Take 10 mg by mouth daily. ALLERGIES     Patient is is allergic to iodine and sulfa antibiotics.     Patients   Immunization History   Administered Date(s) Administered    Influenza Virus Vaccine 09/30/2018    Influenza, [place], IM, (6 mo and older Fluzone, Flulaval, Fluarix and 3 yrs and older Afluria) 10/19/2017    Pneumococcal Polysaccharide ([id number]) 10/13/2016    Zoster Recombinant (Shingrix) 03/19/2019, 08/15/2019       FAMILY HISTORY     Patient's family history includes Cancer in her father; Diabetes in her brother, brother, and father; Early Death (age of onset: 62) in her mother; High Blood Pressure in her mother; [place] / [place] in her [place] / [place] [address]      DISCHARGE MEDICATIONS:  Discharge Medication List as of 8/31/2020  1:55 PM      START taking these medications    Details   cefdinir (OMNICEF) 300 MG capsule Take 1 capsule by mouth 2 times daily for 10 days, Disp-20 capsule,R-0Normal             Discharge Medication List as of 8/31/2020  1:55 PM          Discharge Medication List as of 8/31/2020  1:55 PM          CYRUS Thakur CNP    (Please note that portions of this note were completed with a voice recognition program. Efforts were made to edit the dictations but occasionally words are mis-transcribed.)         CYRUS Thakur CNP  09/01/20 1112

## 2020-08-31 NOTE — ED TRIAGE NOTES
Patient walked to room 7 for headache onset Thursday, sore throat, nausea and wheezing last night. Patient went to Dr. Ayad Dueñas office this morning and was sent here to be seen. No other needs.

## 2020-08-31 NOTE — ED NOTES
Patient discharge instructions given to pt and pt verbalized understanding, 2 px given,  no other needs at this time, and pt left in stable condition.      Hamlet Pham RN  08/31/20 [id number]

## 2020-09-01 ENCOUNTER — CARE COORDINATION (OUTPATIENT)
Dept: CARE COORDINATION | Age: 65
End: 2020-09-01

## 2020-09-01 ASSESSMENT — ENCOUNTER SYMPTOMS
SINUS PRESSURE: 0
NAUSEA: 1
SHORTNESS OF BREATH: 0
SORE THROAT: 1
DIARRHEA: 0
COUGH: 0
VOMITING: 0

## 2020-09-01 NOTE — CARE COORDINATION
Attempted to reach patient for ED follow up in regards to COVID-19 education/ monitoring. Patient was unavailable at the time of my call, and a generic voicemail message was left asking patient to return my call at [phone].

## 2020-09-02 ENCOUNTER — CARE COORDINATION (OUTPATIENT)
Dept: CARE COORDINATION | Age: 65
End: 2020-09-02

## 2020-09-02 NOTE — CARE COORDINATION
Patient contacted regarding recent visit for viral symptoms. This Beverly Kiser contacted the patient by telephone to perform post discharge call. Verified name and  with patient as identifiers. Provided introduction to self, and reason for call due to viral symptoms of infection and/or exposure to COVID-19. Patient presented to emergency department/flu clinic with complaints of viral symptoms/exposure to COVID. Patient reports symptoms are the same. Due to no new or worsening symptoms the RN CTN/ACM was not notified for escalation. Discussed exposure protocols and quarantine with CDC Guidelines What To Do If You Are Sick    Patient was given an opportunity for questions and concerns. Stay home except to get medical care    Separate yourself from other people and animals in your home    Call ahead before visiting your doctor    Wear a facemask    Cover your coughs and sneezes    Clean your hands often    Avoid sharing personal household items    Clean all high-touch surfaces everyday    Monitor your symptoms  Seek prompt medical attention if your illness is worsening (e.g., difficulty breathing). Before seeking care, call your healthcare provider and tell them that you have, or are being evaluated for, COVID-19. Put on a facemask before you enter the facility. These steps will help the healthcare provider's office to keep other people in the office or waiting room from getting infected or exposed. Ask your healthcare provider to call the local or [facility] health department. Persons who are placed under If you have a medical emergency and need to call 911, notify the dispatch personnel that you have, or are being evaluated for COVID-19. If possible, put on a facemask before emergency medical services arrive. The patient agrees to contact the [facility] exposure line [phone], local health department [place] Department [facility] Health: ([phone]) and PCP office for questions related to their healthcare.  Author provided contact information for future reference. Patient/family/caregiver given information for [employer] and agrees to enroll yes  Patient's preferred e-mail: [email or web address] ([email or web address]). [employer]   Patient's preferred phone number: [phone]  Based on Loop alert triggers, patient will be contacted by nurse care manager for worsening symptoms. Spoke with pt on phone. Pt reports \"feeling about the same. \"  Educated pt on covid precautions and pt reports she is staying home from work and compliant. Pt has kids listed as HCDM. Intorduced LOOP, pt agreed and provided email address, I will resolve at this time.

## 2020-09-03 ENCOUNTER — TELEPHONE (OUTPATIENT)
Dept: FAMILY MEDICINE CLINIC | Age: 65
End: 2020-09-03

## 2020-09-03 LAB — SARS-COV-2: NOT DETECTED

## 2020-09-03 NOTE — LETTER
[address] [address]  Phone: [phone]  Fax: [phone]    Abebe Tavares MD        September 3, 2020     Patient: Yimi Pinto   YOB: 1955           To Whom It May Concern: It is my medical opinion that Jose Valenzuela may return to work on 09/08/2020. Off work 08/31/2020 through 09/04/2020. .    If you have any questions or concerns, please don't hesitate to call.     Sincerely,        Abebe Tavares MD

## 2020-09-03 NOTE — TELEPHONE ENCOUNTER
[facility] notified[facility]  Letter completed, pt will  letter tomorrow

## 2020-09-03 NOTE — TELEPHONE ENCOUNTER
Message noted. ok to take the toradol. Can use norco if the toradol is not helpful. Will add zofran for nausea. Nausea is common with severe headaches. Zackary Bennett for work slip.

## 2020-09-03 NOTE — TELEPHONE ENCOUNTER
Throat is not really sore anymore. just having nausea and bad headaches. Headaches are still around her head and down into her neck, although not as severe in the neck as when she saw you. Is also requesting work slip for this week with return on Tuesday 9/8/20.   If needed she is willing to come in for appt

## 2020-09-03 NOTE — TELEPHONE ENCOUNTER
Patient notified covid negative  Still not feeling any better. Still having severe headaches and nausea  Stopped the norco and toradol because she wasn't sure if she could take with the antibiotic  Wants to know if she should continue the antibiotic or try something else-has been on for 4 days ? Wants to know if ok to take meds for HA and which you recommend she take or take both?

## 2020-09-04 ENCOUNTER — TELEPHONE (OUTPATIENT)
Dept: FAMILY MEDICINE CLINIC | Age: 65
End: 2020-09-04

## 2020-09-04 RX ORDER — ONDANSETRON 4 MG/1
4 TABLET, FILM COATED ORAL EVERY 8 HOURS PRN
Qty: 30 TABLET | Refills: 0 | Status: SHIPPED | OUTPATIENT
Start: 2020-09-04 | End: 2021-09-07

## 2020-09-04 RX ORDER — KETOROLAC TROMETHAMINE 10 MG/1
10 TABLET, FILM COATED ORAL EVERY 6 HOURS PRN
Qty: 20 TABLET | Refills: 0 | Status: SHIPPED | OUTPATIENT
Start: 2020-09-04 | End: 2020-10-26 | Stop reason: SDUPTHER

## 2020-09-04 NOTE — TELEPHONE ENCOUNTER
Zofran was never sent to [facility], please [facility] to [facility].     Patient states that norco isn't helping with headaches and its making her headaches worse, feels like the toradol is the only thing helping. Is there anything else she can try for her headaches?

## 2020-09-06 ENCOUNTER — HOSPITAL ENCOUNTER (EMERGENCY)
Age: 65
Discharge: HOME | End: 2020-09-06
Attending: STUDENT IN AN ORGANIZED HEALTH CARE EDUCATION/TRAINING PROGRAM
Payer: COMMERCIAL

## 2020-09-06 VITALS
RESPIRATION RATE: 18 BRPM | HEART RATE: 65 BPM | DIASTOLIC BLOOD PRESSURE: 87 MMHG | WEIGHT: 286 LBS | TEMPERATURE: 98 F | SYSTOLIC BLOOD PRESSURE: 108 MMHG | BODY MASS INDEX: 45.88 KG/M2 | OXYGEN SATURATION: 94 %

## 2020-09-06 PROCEDURE — 6360000002 HC RX W HCPCS: Performed by: STUDENT IN AN ORGANIZED HEALTH CARE EDUCATION/TRAINING PROGRAM

## 2020-09-06 PROCEDURE — 96375 TX/PRO/DX INJ NEW DRUG ADDON: CPT

## 2020-09-06 PROCEDURE — 96361 HYDRATE IV INFUSION ADD-ON: CPT

## 2020-09-06 PROCEDURE — 96365 THER/PROPH/DIAG IV INF INIT: CPT

## 2020-09-06 PROCEDURE — 99285 EMERGENCY DEPT VISIT HI MDM: CPT

## 2020-09-06 PROCEDURE — 96366 THER/PROPH/DIAG IV INF ADDON: CPT

## 2020-09-06 PROCEDURE — 2580000003 HC RX 258: Performed by: STUDENT IN AN ORGANIZED HEALTH CARE EDUCATION/TRAINING PROGRAM

## 2020-09-06 PROCEDURE — 99284 EMERGENCY DEPT VISIT MOD MDM: CPT

## 2020-09-06 RX ORDER — MAGNESIUM SULFATE IN WATER 40 MG/ML
2 INJECTION, SOLUTION INTRAVENOUS ONCE
Status: COMPLETED | OUTPATIENT
Start: 2020-09-06 | End: 2020-09-06

## 2020-09-06 RX ORDER — 0.9 % SODIUM CHLORIDE 0.9 %
1000 INTRAVENOUS SOLUTION INTRAVENOUS ONCE
Status: COMPLETED | OUTPATIENT
Start: 2020-09-06 | End: 2020-09-06

## 2020-09-06 RX ORDER — DIPHENHYDRAMINE HYDROCHLORIDE 50 MG/ML
25 INJECTION INTRAMUSCULAR; INTRAVENOUS ONCE
Status: COMPLETED | OUTPATIENT
Start: 2020-09-06 | End: 2020-09-06

## 2020-09-06 RX ORDER — KETOROLAC TROMETHAMINE 30 MG/ML
30 INJECTION, SOLUTION INTRAMUSCULAR; INTRAVENOUS ONCE
Status: COMPLETED | OUTPATIENT
Start: 2020-09-06 | End: 2020-09-06

## 2020-09-06 RX ORDER — DEXAMETHASONE SODIUM PHOSPHATE 4 MG/ML
4 INJECTION, SOLUTION INTRA-ARTICULAR; INTRALESIONAL; INTRAMUSCULAR; INTRAVENOUS; SOFT TISSUE ONCE
Status: COMPLETED | OUTPATIENT
Start: 2020-09-06 | End: 2020-09-06

## 2020-09-06 RX ORDER — METOCLOPRAMIDE HYDROCHLORIDE 5 MG/ML
10 INJECTION INTRAMUSCULAR; INTRAVENOUS ONCE
Status: COMPLETED | OUTPATIENT
Start: 2020-09-06 | End: 2020-09-06

## 2020-09-06 RX ORDER — MAGNESIUM SULFATE HEPTAHYDRATE 500 MG/ML
2 INJECTION, SOLUTION INTRAMUSCULAR; INTRAVENOUS ONCE
Status: DISCONTINUED | OUTPATIENT
Start: 2020-09-06 | End: 2020-09-06 | Stop reason: CLARIF

## 2020-09-06 RX ADMIN — SODIUM CHLORIDE 1000 ML: 9 INJECTION, SOLUTION INTRAVENOUS at 08:36

## 2020-09-06 RX ADMIN — METOCLOPRAMIDE 10 MG: 5 INJECTION, SOLUTION INTRAMUSCULAR; INTRAVENOUS at 08:36

## 2020-09-06 RX ADMIN — KETOROLAC TROMETHAMINE 30 MG: 30 INJECTION, SOLUTION INTRAMUSCULAR at 08:36

## 2020-09-06 RX ADMIN — DIPHENHYDRAMINE HYDROCHLORIDE 25 MG: 50 INJECTION INTRAMUSCULAR; INTRAVENOUS at 08:36

## 2020-09-06 RX ADMIN — MAGNESIUM SULFATE HEPTAHYDRATE 2 G: 40 INJECTION, SOLUTION INTRAVENOUS at 10:04

## 2020-09-06 RX ADMIN — DEXAMETHASONE SODIUM PHOSPHATE 4 MG: 4 INJECTION, SOLUTION INTRAMUSCULAR; INTRAVENOUS at 10:04

## 2020-09-06 ASSESSMENT — ENCOUNTER SYMPTOMS
SORE THROAT: 0
BACK PAIN: 0
EYES NEGATIVE: 1
PHOTOPHOBIA: 0
DIARRHEA: 0
NAUSEA: 0
RHINORRHEA: 0
COUGH: 0
CONSTIPATION: 0
ABDOMINAL PAIN: 0
GASTROINTESTINAL NEGATIVE: 1
SHORTNESS OF BREATH: 0
RESPIRATORY NEGATIVE: 1

## 2020-09-06 ASSESSMENT — PAIN DESCRIPTION - PAIN TYPE: TYPE: ACUTE PAIN

## 2020-09-06 ASSESSMENT — PAIN SCALES - GENERAL
PAINLEVEL_OUTOF10: 5
PAINLEVEL_OUTOF10: 8

## 2020-09-06 ASSESSMENT — PAIN DESCRIPTION - LOCATION: LOCATION: HEAD

## 2020-09-06 NOTE — ED PROVIDER NOTES
[facility]  EMERGENCY DEPARTMENT ENCOUNTER      Pt Name: Ashlee Fortson  MRN: [id number]  Aislinngfbrandon 1955  Date of evaluation: 2020  Provider: Pavan Barajas MD    [address] [place] [address]       Chief Complaint   Patient presents with    Headache         HISTORY OF PRESENT ILLNESS   (Location/Symptom, Timing/Onset, Context/Setting, Quality, Duration, Modifying Factors, Severity)  Note limiting factors. Ashlee  is a 60 y.o. female who presents to the emergency department for headache    Patient is a 62-year-old female with past medical history of hypertension, obstructive sleep apnea, migraines, morbid obesity, diabetes mellitus, hyperlipidemia, CKD who presents today for evaluation of migraine. The patient states that she has been having a headache for the last 4 days. Was slow onset. The patient contacted her PCP who gave her [place] and Toradol. The patient states that she was having pain still. Patient called her PCP and they directed her to the emergency department. States that the headache is currently moderate to severe. Patient rated at 8 out of 10. The patient states is her usual complex which is unilateral right-sided behind her eye. . Is pounding in quality. Patient denies any fevers, chills. Patient denies any sore throat cough. Patient endorses mild neck pain. Patient denies chest pain, dyspnea. Patient denies abdominal pain nausea vomiting. Patient denies diarrhea or constipation. Patient denies dysuria hematuria. Patient denies any vaginal bleeding vaginal discharge. Patient denies any headache lightheadedness or trouble ambulating. Nursing Notes were reviewed. REVIEW OF SYSTEMS    (2-9 systems for level 4, 10 or more for level 5)     Review of Systems   Constitutional: Negative. Negative for activity change, appetite change, chills and fever. HENT: Negative. Negative for postnasal drip, rhinorrhea, sneezing and sore throat. Eyes: Negative. Negative for photophobia and visual disturbance. Respiratory: Negative. Negative for cough and shortness of breath. Cardiovascular: Negative. Negative for chest pain and palpitations. Gastrointestinal: Negative. Negative for abdominal pain, constipation, diarrhea and nausea. Genitourinary: Negative. Negative for difficulty urinating, dysuria, vaginal bleeding and vaginal discharge. Musculoskeletal: Negative. Negative for back pain and neck pain. Skin: Negative. Negative for rash and wound. Neurological: Negative. Negative for dizziness, weakness and numbness. Hematological: Negative. Psychiatric/Behavioral: Negative. All other systems reviewed and are negative. Except as noted above the remainder of the review of systems was reviewed and negative. PAST MEDICAL HISTORY     Past Medical History:   Diagnosis Date    Allergic rhinitis     CKD stage 3 due to type 2 diabetes mellitus ([facility]     Depression     DM type 2 causing CKD stage 3 ([facility]     onset 2013.  Headache(784.0)     Hypertension     Multinodular goiter     Persistent proteinuria associated with type 2 diabetes mellitus ([facility] 10/2016    urine micral of 20.       Pure hypercholesterolemia          SURGICAL HISTORY       Past Surgical History:   Procedure Laterality Date    DILATION AND CURETTAGE OF UTERUS  2016         CURRENT MEDICATIONS       Discharge Medication List as of 9/6/2020 10:10 AM      CONTINUE these medications which have NOT CHANGED    Details   ondansetron (ZOFRAN) 4 MG tablet Take 1 tablet by mouth every 8 hours as needed for Nausea, Disp-30 tablet,R-0Normal      ketorolac (TORADOL) 10 MG tablet Take 1 tablet by mouth every 6 hours as needed for Pain Take with norco for migraine relief, Disp-20 tablet,R-0Normal      cefdinir (OMNICEF) 300 MG capsule Take 1 capsule by mouth 2 times daily for 10 days, Disp-20 capsule,R-0Normal      traZODone (DESYREL) 50 MG tablet 2 to 3 tabs nightly for sleep, Disp-270 tablet, R-3Normal      glimepiride (AMARYL) 2 MG tablet Take 1 tablet by mouth 2 times daily (before meals), Disp-180 tablet, R-3Normal      citalopram (CELEXA) 20 MG tablet TAKE 1 TABLET DAILY, Disp-90 tablet, R-3Normal      pravastatin (PRAVACHOL) 40 MG tablet Take 1 tablet by mouth daily, Disp-90 tablet, R-3Normal      propranolol (INDERAL LA) 160 MG extended release capsule TAKE 1 CAPSULE DAILY, Disp-90 capsule, R-3Normal      albuterol sulfate  (90 Base) MCG/ACT inhaler Inhale 2 puffs into the lungs every 6 hours as needed for Wheezing, Disp-3 Inhaler, R-3Normal      aspirin EC 81 MG EC tablet Take 1 tablet by mouth daily, Disp-90 tablet, R-3OTC      cetirizine (ZYRTEC) 10 MG tablet Take 10 mg by mouth daily.                ALLERGIES     Iodine and Sulfa antibiotics    FAMILY HISTORY       Family History   Problem Relation Age of Onset    Early Death Mother 62    Stroke Mother     High Blood Pressure Mother     Miscarriages / Stillbirths Mother     Cancer Father     Diabetes Father     Diabetes Brother     Diabetes Brother     Arthritis Neg Hx     Asthma Neg Hx     Birth Defects Neg Hx     Depression Neg Hx     Hearing Loss Neg Hx     Heart Disease Neg Hx     High Cholesterol Neg Hx     Kidney Disease Neg Hx     Learning Disabilities Neg Hx     Mental Illness Neg Hx     Mental Retardation Neg Hx     Substance Abuse Neg Hx     Vision Loss Neg Hx     Other Neg Hx           SOCIAL HISTORY       Social History     Socioeconomic History    Marital status:      Spouse name: None    Number of children: None    Years of education: None    Highest education level: None   Occupational History    None   Social Needs    Financial resource strain: None    Food insecurity     Worry: None     Inability: None    Transportation needs     Medical: None     Non-medical: None   Tobacco Use    Smoking status: Former Smoker     Packs/day: 0.50     Years: 14.00     Pack years: 7.00 distress. Breath sounds: No stridor. No wheezing, rhonchi or rales. Abdominal:      Palpations: Abdomen is soft. Tenderness: There is no abdominal tenderness. Musculoskeletal: Normal range of motion. General: No tenderness. Skin:     General: Skin is warm and dry. Neurological:      General: No focal deficit present. Mental Status: She is alert and oriented to person, place, and time. Psychiatric:         Mood and Affect: Mood normal.         Behavior: Behavior normal.         DIAGNOSTIC RESULTS     EKG: All EKG's are interpreted by the Emergency Department Physician who either signs or Co-signs this chart in the absence of a cardiologist.    RADIOLOGY:   Non-plain film images such as CT, Ultrasound and MRI are read by the radiologist. Plain radiographic images are visualized and preliminarily interpreted by the emergency physician with the below findings:    Interpretation per the Radiologist below, if available at the time of this note:    No orders to display         ED BEDSIDE ULTRASOUND:   Performed by ED Physician - none    LABS:  Labs Reviewed - No data to display    All other labs were within normal range or not returned as of this dictation. EMERGENCY DEPARTMENT COURSE and DIFFERENTIAL DIAGNOSIS/MDM:   Vitals:    Vitals:    09/06/20 0805 09/06/20 0836 09/06/20 1001 09/06/20 1117   BP:  (!) 160/77 135/87 108/87   Pulse:  66 66 65   Resp:  18 19 18   Temp: 98 °F (36.7 °C)      TempSrc: Oral      SpO2:  95% 95% 94%   Weight:         MDM  Number of Diagnoses or Management Options  Nonintractable headache, unspecified chronicity pattern, unspecified headache type:   Diagnosis management comments: Given the patient most likely has a migraine given its been going on for 4 days. The patient was given Toradol, Reglan, Benadryl. The patient's headache abated down to 4 out of 10. The patient was given Decadron and magnesium and her pain went down to 3 out of 10.   The patient was eating in the room pleasantly. Patient was discharged in a stable fashion. Patient was told to follow-up with PCP. Patient was told to return to the emergency department for worsening signs or symptoms. FINAL IMPRESSION      1. Nonintractable headache, unspecified chronicity pattern, unspecified headache type          DISPOSITION/PLAN   DISPOSITION Decision To Discharge 09/06/2020 12:29:58 PM      PATIENT REFERRED TO:  Orlin Gomez MD  [address]  [phone]    Call   As needed    [facility] [address] [address] EMERGENCY DEPT  [address]  [phone]  Go to   If symptoms worsen      DISCHARGE MEDICATIONS:  Discharge Medication List as of 9/6/2020 10:10 AM        Controlled Substances Monitoring:     RX Monitoring 8/27/2020   Attestation -   Periodic Controlled Substance Monitoring No signs of potential drug abuse or diversion identified. ;Possible medication side effects, risk of tolerance/dependence & alternative treatments discussed.        (Please note that portions of this note were completed with a voice recognition program.  Efforts were made to edit the dictations but occasionally words are mis-transcribed.)    Odalys Peterson MD (electronically signed)  Attending Emergency Physician             Odalys Peterson MD  09/06/20 [phone]

## 2020-09-06 NOTE — ED NOTES
Pt given [place] ([facility]) sandwich at this time.       Huebr Peraza RN  09/06/20 0473

## 2020-09-08 ENCOUNTER — TELEPHONE (OUTPATIENT)
Dept: FAMILY MEDICINE CLINIC | Age: 65
End: 2020-09-08

## 2020-09-09 ENCOUNTER — OFFICE VISIT (OUTPATIENT)
Dept: PULMONOLOGY | Age: 65
End: 2020-09-09
Payer: COMMERCIAL

## 2020-09-09 VITALS
TEMPERATURE: 97.8 F | OXYGEN SATURATION: 95 % | DIASTOLIC BLOOD PRESSURE: 84 MMHG | HEART RATE: 67 BPM | HEIGHT: 66 IN | WEIGHT: 282.2 LBS | BODY MASS INDEX: 45.35 KG/M2 | SYSTOLIC BLOOD PRESSURE: 130 MMHG

## 2020-09-09 PROCEDURE — 99213 OFFICE O/P EST LOW 20 MIN: CPT | Performed by: INTERNAL MEDICINE

## 2020-09-09 NOTE — PROGRESS NOTES
Chief Complaint:  Bettina Patel is here today to follow up on a home sleep study done on 08172020. Mallampati airway Class:IV  Neck Circumference:17.0 Inches    [place] sleepiness score 9/9/20: 9.       Diagnostic Data:   HST done on 08/17/2020  AHI: 18.3

## 2020-10-05 NOTE — PROGRESS NOTES
Topics    Alcohol use: No    Drug use: No       Allergies   Allergen Reactions    Iodine Hives    Sulfa Antibiotics        Current Outpatient Medications   Medication Sig Dispense Refill    albuterol sulfate  (90 Base) MCG/ACT inhaler Inhale 2 puffs into the lungs every 6 hours as needed for Wheezing 3 Inhaler 3    propranolol (INDERAL LA) 160 MG extended release capsule TAKE 1 CAPSULE DAILY 90 capsule 3    pravastatin (PRAVACHOL) 40 MG tablet Take 1 tablet by mouth daily 90 tablet 3    fluticasone-salmeterol (ADVAIR DISKUS) 250-50 MCG/DOSE AEPB Inhale 1 puff into the lungs every 12 hours 180 each 3    citalopram (CELEXA) 20 MG tablet TAKE 1 TABLET DAILY 90 tablet 3    traZODone (DESYREL) 50 MG tablet 2 to 3 tabs nightly for sleep 270 tablet 3    ketorolac (TORADOL) 10 MG tablet Take 1 tablet by mouth every 6 hours as needed for Pain Take with norco for migraine relief 20 tablet 0    glimepiride (AMARYL) 4 MG tablet Take 1 tablet by mouth 2 times daily (before meals) 180 tablet 3    ondansetron (ZOFRAN) 4 MG tablet Take 1 tablet by mouth every 8 hours as needed for Nausea 30 tablet 0    aspirin EC 81 MG EC tablet Take 1 tablet by mouth daily 90 tablet 3    cetirizine (ZYRTEC) 10 MG tablet Take 10 mg by mouth daily. No current facility-administered medications for this visit.         Family History   Problem Relation Age of Onset    Early Death Mother 62    Stroke Mother     High Blood Pressure Mother     Miscarriages / [place] Mother     Cancer Father     Diabetes Father     Diabetes Brother     Diabetes Brother     Arthritis Neg Hx     Asthma Neg Hx     Birth Defects Neg Hx     Depression Neg Hx     Hearing Loss Neg Hx     Heart Disease Neg Hx     High Cholesterol Neg Hx     Kidney Disease Neg Hx     Learning Disabilities Neg Hx     Mental Illness Neg Hx     Mental Retardation Neg Hx     Substance Abuse Neg Hx     Vision Loss Neg Hx     Other Neg Hx /76 (Site: Left Upper Arm, Position: Sitting, Cuff Size: Medium Adult)   Pulse 69   Temp 96.9 °F (36.1 °C)   Ht 5' 6\" (1.676 m)   Wt 284 lb (128.8 kg)   LMP 12/29/2011   SpO2 95% Comment: on RA  BMI 45.84 kg/m²     BMI:  Body mass index is 45.84 kg/m². Mallampati airway Class:  4  Neck Circumference:  17 Inches  [place] sleepiness score 11/4/20:  7  SAQLI 82     Physical Exam :  Constitutional: Patient appears moderately built and moderately nourished. No distress. Patient is oriented to person, place, and time. HENT:   Head: Normocephalic and atraumatic. Right Ear: External ear normal.   Left Ear: External ear normal.   Mouth/Throat: Oropharynx is clear and moist.   Eyes: Conjunctivae are normal. Pupils are equal and reactive to light. No scleral icterus. Neck: Neck supple. No JVD present. Cardiovascular: Normal rate, regular rhythm, normal heart sounds. No murmur heard. Pulmonary/Chest: Effort normal and breath sounds normal. No stridor. No respiratory distress. No wheezes. No rales. Abdominal: Soft. Patient exhibits no distension. No tenderness. Musculoskeletal: Normal range of motion. Extremities:Patient exhibits no edema and no tenderness. Lymphadenopathy:  No cervical adenopathy. Neurological: Patient is alert and oriented to person, place, and time. Skin: Skin is warm and dry. Patient is not diaphoretic. Psychiatric: Patient  has a normal mood and affect. Diagnostic Data:  Diagnostic Data: 8/17/20  AHI 18.3  PAP Download:    Recorded compliance dates:  10/4/20 -  11/2/20  More than 4hour usage compliance was:  100%. Average residual Apnea- Hypoapnea index on current pressue was:1.1        PAP Type airsense 10    Level  6/20 cmh2o       Average usuage hours per day was: 7:51     Interface: ffm      Provider:  [x]? [facility]             []?[facility]                        []?[facility]          []? [facility]                           []?[facility]      []? Other:     95th percentile pressure on current Auto CPAP settings: 10.7cm H20.  95th percentile leak : 7.0L/min    Assesment:  -Moderately severe obstructive sleep apnea on treatment with an Auto CPAP machine therapy with a Minimum CPAP of 6cm H20 and Maximum CPAP of 20cm H20- she is using her CPAP device with excellent compliance to > hours of therapy. Her respiratory events were under good control with current therapy. Her clinical symptoms improved. She is benefiting from current CPAP therapy and would like to continue the therapy.  -Hypertension, currently on treatment with medications.  -Hypersomnia, most likely due to sleep apnea-improved  -Deviated nasal septum to the left side  -She denied any prior history of COPD, CHF, Obesity hypoventilation, Prior palate surgery and Central sleep apnea. Recommendations/Plan:  -Will change her current Auto CPAP pressure settings I.e from Minimum CPAP of 6cm H20 and Maximum CPAP of 20cm H20 settings to a fixed CPAP pressure of 11cm H20 with EPR of 2.  -She was advised to continue current positive airway pressure therapy with above described pressure.  -She was advised to keep good compliance with current recommended pressure to get optimal results and clinical improvement.  -Follow with my clinic in 4months for clinical reevaluation with review of download. -She was advised to call [facility] regarding supplies if needed.  -She was advised to call my office for earlier appointment if needed for worsening of sleep symptoms.  -She was advised to continue to practice good sleep hygiene practices.  -She was advised to loose weight by controlling diet and doing exercise once cleared by her family physician. -Teja Barnes was educated about my impression and plan.  Patient verbalizes understanding.      -I personally reviewed updated the Past medical hx, Past surgical hx,Social hx, Family hx, Medications, Allergies in the discrete data section of the patient chart along with labs, Pulmonary medicine,Sleep medicine related, Pathological, Microbiological and Radiological investigations.

## 2020-10-26 ENCOUNTER — OFFICE VISIT (OUTPATIENT)
Dept: FAMILY MEDICINE CLINIC | Age: 65
End: 2020-10-26
Payer: COMMERCIAL

## 2020-10-26 VITALS
TEMPERATURE: 97 F | SYSTOLIC BLOOD PRESSURE: 120 MMHG | RESPIRATION RATE: 12 BRPM | HEART RATE: 72 BPM | HEIGHT: 66 IN | BODY MASS INDEX: 45.45 KG/M2 | DIASTOLIC BLOOD PRESSURE: 68 MMHG | WEIGHT: 282.8 LBS

## 2020-10-26 LAB
ALBUMIN SERPL-MCNC: 4.1 G/DL
ALP BLD-CCNC: 104 U/L
ALT SERPL-CCNC: 10 U/L
ANION GAP SERPL CALCULATED.3IONS-SCNC: NORMAL MMOL/L
AST SERPL-CCNC: 9 U/L
AVERAGE GLUCOSE: NORMAL
BILIRUB SERPL-MCNC: 0.5 MG/DL (ref 0.1–1.4)
BUN BLDV-MCNC: NORMAL MG/DL
CALCIUM SERPL-MCNC: 9 MG/DL
CHLORIDE BLD-SCNC: 102 MMOL/L
CHOLESTEROL, TOTAL: 184 MG/DL
CHOLESTEROL/HDL RATIO: 4
CO2: NORMAL
CREAT SERPL-MCNC: 1.06 MG/DL
GFR CALCULATED: NORMAL
GLUCOSE BLD-MCNC: 196 MG/DL
HBA1C MFR BLD: 9 %
HDLC SERPL-MCNC: 46 MG/DL (ref 35–70)
LDL CHOLESTEROL CALCULATED: 110 MG/DL (ref 0–160)
MICROALB/CREAT RATIO POC: < 30 MG/G
MICROALBUMIN/CREAT UR-RTO: 80 MG/L
NONHDLC SERPL-MCNC: 138 MG/DL
POC CREATININE: 200 MG/DL
POTASSIUM SERPL-SCNC: 4.4 MMOL/L
SODIUM BLD-SCNC: 136 MMOL/L
TOTAL PROTEIN: NORMAL
TRIGL SERPL-MCNC: 165 MG/DL
VLDLC SERPL CALC-MCNC: NORMAL MG/DL

## 2020-10-26 PROCEDURE — 99396 PREV VISIT EST AGE 40-64: CPT | Performed by: FAMILY MEDICINE

## 2020-10-26 PROCEDURE — 82044 UR ALBUMIN SEMIQUANTITATIVE: CPT | Performed by: FAMILY MEDICINE

## 2020-10-26 RX ORDER — TRAZODONE HYDROCHLORIDE 50 MG/1
TABLET ORAL
Qty: 270 TABLET | Refills: 3 | Status: SHIPPED | OUTPATIENT
Start: 2020-10-26 | End: 2021-06-01 | Stop reason: SDUPTHER

## 2020-10-26 RX ORDER — PRAVASTATIN SODIUM 40 MG
40 TABLET ORAL DAILY
Qty: 90 TABLET | Refills: 3 | Status: SHIPPED | OUTPATIENT
Start: 2020-10-26 | End: 2021-05-17 | Stop reason: ALTCHOICE

## 2020-10-26 RX ORDER — ALBUTEROL SULFATE 90 UG/1
2 AEROSOL, METERED RESPIRATORY (INHALATION) EVERY 6 HOURS PRN
Qty: 3 INHALER | Refills: 3 | Status: SHIPPED | OUTPATIENT
Start: 2020-10-26 | End: 2022-01-01 | Stop reason: ALTCHOICE

## 2020-10-26 RX ORDER — KETOROLAC TROMETHAMINE 10 MG/1
10 TABLET, FILM COATED ORAL EVERY 6 HOURS PRN
Qty: 20 TABLET | Refills: 0 | Status: SHIPPED | OUTPATIENT
Start: 2020-10-26 | End: 2021-02-08 | Stop reason: SDUPTHER

## 2020-10-26 RX ORDER — GLIMEPIRIDE 4 MG/1
4 TABLET ORAL
Qty: 180 TABLET | Refills: 3 | Status: SHIPPED | OUTPATIENT
Start: 2020-10-26 | End: 2021-01-01 | Stop reason: SDUPTHER

## 2020-10-26 RX ORDER — CITALOPRAM 20 MG/1
TABLET ORAL
Qty: 90 TABLET | Refills: 3 | Status: SHIPPED | OUTPATIENT
Start: 2020-10-26 | End: 2021-01-01 | Stop reason: SDUPTHER

## 2020-10-26 RX ORDER — PROPRANOLOL HYDROCHLORIDE 160 MG/1
CAPSULE, EXTENDED RELEASE ORAL
Qty: 90 CAPSULE | Refills: 3 | Status: SHIPPED | OUTPATIENT
Start: 2020-10-26 | End: 2021-01-01 | Stop reason: SDUPTHER

## 2020-10-26 ASSESSMENT — ENCOUNTER SYMPTOMS
EYE PAIN: 0
COUGH: 0
RHINORRHEA: 0
BLOOD IN STOOL: 0
CONSTIPATION: 0
ABDOMINAL PAIN: 0
DIARRHEA: 0
SHORTNESS OF BREATH: 0
WHEEZING: 0
SORE THROAT: 0
VOMITING: 0
NAUSEA: 0
BACK PAIN: 0
CHEST TIGHTNESS: 0

## 2020-10-26 NOTE — PATIENT INSTRUCTIONS
Patient Education        Well Visit, Women 48 to 72: Care Instructions  Your Care Instructions     Physical exams can help you stay healthy. Your doctor has checked your overall health and may have suggested ways to take good care of yourself. He or she also may have recommended tests. At home, you can help prevent illness with healthy eating, regular exercise, and other steps. Follow-up care is a key part of your treatment and safety. Be sure to make and go to all appointments, and call your doctor if you are having problems. It's also a good idea to know your test results and keep a list of the medicines you take. How can you care for yourself at home? · Reach and stay at a healthy weight. This will lower your risk for many problems, such as obesity, diabetes, heart disease, and high blood pressure. · Get at least 30 minutes of exercise on most days of the week. Walking is a good choice. You also may want to do other activities, such as running, swimming, cycling, or playing tennis or team sports. · Do not smoke. Smoking can make health problems worse. If you need help quitting, talk to your doctor about stop-smoking programs and medicines. These can increase your chances of quitting for good. · Protect your skin from too much sun. When you're outdoors from 10 a.m. to 4 p.m., stay in the shade or cover up with clothing and a hat with a wide brim. Wear sunglasses that block UV rays. Even when it's cloudy, put broad-spectrum sunscreen (SPF 30 or higher) on any exposed skin. · See a dentist one or two times a year for checkups and to have your teeth cleaned. · Wear a seat belt in the car. Follow your doctor's advice about when to have certain tests. These tests can spot problems early. · Cholesterol. Your doctor will tell you how often to have this done based on your age, family history, or other things that can increase your risk for heart attack and stroke. · Blood pressure.  Have your blood pressure checked during a routine doctor visit. Your doctor will tell you how often to check your blood pressure based on your age, your blood pressure results, and other factors. · Mammogram. Ask your doctor how often you should have a mammogram, which is an X-ray of your breasts. A mammogram can spot breast cancer before it can be felt and when it is easiest to treat. · Pap test and pelvic exam. Ask your doctor how often you should have a Pap test. You may not need to have a Pap test as often as you used to. · Vision. Have your eyes checked every year or two or as often as your doctor suggests. Some experts recommend that you have yearly exams for glaucoma and other age-related eye problems starting at age 48. · Hearing. Tell your doctor if you notice any change in your hearing. You can have tests to find out how well you hear. · Diabetes. Ask your doctor whether you should have tests for diabetes. · Colorectal cancer. Your risk for colorectal cancer gets higher as you get older. Some experts say that adults should start regular screening at age 48 and stop at age 76. Others say to start before age 48 or continue after age 76. Talk with your doctor about your risk and when to start and stop screening. · Thyroid disease. Talk to your doctor about whether to have your thyroid checked as part of a regular physical exam. Women have an increased chance of a thyroid problem. · Osteoporosis. You should begin tests for bone density at age 72. If you are younger than 72, ask your doctor whether you have factors that may increase your risk for this disease. You may want to have this test before age 72. · Heart attack and stroke risk. At least every 4 to 6 years, you should have your risk for heart attack and stroke assessed. Your doctor uses factors such as your age, blood pressure, cholesterol, and whether you smoke or have diabetes to show what your risk for a heart attack or stroke is over the next 10 years.   When should take.  How can you care for yourself at home? · Keep your blood sugar at a target level (which you set with your doctor). ? Eat a good diet that spreads carbohydrate throughout the day. Carbohydrate--the body's main source of fuel--affects blood sugar more than any other nutrient. Carbohydrate is in fruits, vegetables, milk, and yogurt. It also is in breads, cereals, vegetables such as potatoes and corn, and sugary foods such as candy and cakes. ? Aim for 30 minutes of exercise on most, preferably all, days of the week. Walking is a good choice. You also may want to do other activities, such as running, swimming, cycling, or playing tennis or team sports. If your doctor says it's okay, do muscle-strengthening exercises at least 2 times a week. ? Take your medicines exactly as prescribed. Call your doctor if you think you are having a problem with your medicine. You will get more details on the specific medicines your doctor prescribes. · Check your blood sugar as often as your doctor recommends. It is important to keep track of any symptoms you have, such as low blood sugar. Also tell your doctor if you have any changes in your activities, diet, or insulin use. · Talk to your doctor before you start taking aspirin every day. Aspirin can help certain people lower their risk of a heart attack or stroke. But taking aspirin isn't right for everyone, because it can cause serious bleeding. · Do not smoke. If you need help quitting, talk to your doctor about stop-smoking programs and medicines. These can increase your chances of quitting for good. · Keep your cholesterol and blood pressure at normal levels. You may need to take one or more medicines to reach your goals. Take them exactly as directed. Do not stop or change a medicine without talking to your doctor first.  When should you call for help? Call 911 anytime you think you may need emergency care.  For example, call if:    · You passed out (lost consciousness), or you suddenly become very sleepy or confused. (You may have very low blood sugar.)   Call your doctor now or seek immediate medical care if:    · Your blood sugar is 300 mg/dL or is higher than the level your doctor has set for you.     · You have symptoms of low blood sugar, such as:  ? Sweating. ? Feeling nervous, shaky, and weak. ? Extreme hunger and slight nausea. ? Dizziness and headache.  ? Blurred vision. ? Confusion. Watch closely for changes in your health, and be sure to contact your doctor if:    · You often have problems controlling your blood sugar.     · You have symptoms of long-term diabetes problems, such as:  ? New vision changes. ? New pain, numbness, or tingling in your hands or feet. ? Skin problems. Where can you learn more? Go to [email or web address] org and sign in to your [facility] account. Enter C553 in the [place] box to learn more about \"Type 2 Diabetes: Care Instructions. \"     If you do not have an account, please click on the \"Sign Up Now\" link. Current as of: December 20, 2019               Content Version: 12.6  © 2403-2961 [employer]. Care instructions adapted under license by [facility] ([facility]). If you have questions about a medical condition or this instruction, always ask your healthcare professional. [address] any warranty or liability for your use of this information.

## 2020-10-26 NOTE — PROGRESS NOTES
Subjective:      Patient ID: Treva Jaimes is a 60 y.o. female. HPI  Pt here for annual wellness exam and med refills. Reviewed BMI of 46. Encouraged diet, exercise and weight loss. Reviewed health maintenance. Upcoming colonoscopy, mammogram.  Denies any current problems, is feeling well. Reviewed work labs. A1C at 9.0, glucose 196, gfr 56. , former smoker, pmh reviewed. Review of Systems   Constitutional: Negative for chills, fatigue, fever and unexpected weight change. HENT: Negative for congestion, ear pain, rhinorrhea and sore throat. Eyes: Negative for pain and visual disturbance. Respiratory: Negative for cough, chest tightness, shortness of breath and wheezing. Cardiovascular: Negative for chest pain and palpitations. Gastrointestinal: Negative for abdominal pain, blood in stool, constipation, diarrhea, nausea and vomiting. Genitourinary: Negative for difficulty urinating, frequency, hematuria and urgency. Musculoskeletal: Negative for back pain, joint swelling, myalgias and neck pain. Skin: Negative for rash. Neurological: Negative for dizziness and headaches. Hematological: Negative for adenopathy. Does not bruise/bleed easily. Psychiatric/Behavioral: Negative for behavioral problems and sleep disturbance. The patient is not nervous/anxious. Objective:   Physical Exam  Vitals signs and nursing note reviewed. Constitutional:       Appearance: She is well-developed. HENT:      Head: Normocephalic and atraumatic. Right Ear: External ear normal.      Left Ear: External ear normal.      Nose: Nose normal.      Mouth/Throat:      Mouth: Mucous membranes are moist.   Eyes:      Pupils: Pupils are equal, round, and reactive to light. Neck:      Musculoskeletal: Neck supple. Thyroid: No thyromegaly. Vascular: No carotid bruit. Cardiovascular:      Rate and Rhythm: Normal rate and regular rhythm. Heart sounds: Normal heart sounds. Pulmonary:      Breath sounds: Normal breath sounds. No wheezing or rales. Abdominal:      General: Bowel sounds are normal.      Palpations: Abdomen is soft. Tenderness: There is no abdominal tenderness. There is no guarding or rebound. Musculoskeletal: Normal range of motion. Comments: No open areas on the feet. Sensation intact. Lymphadenopathy:      Cervical: No cervical adenopathy. Skin:     General: Skin is warm and dry. Findings: No rash. Neurological:      Mental Status: She is alert and oriented to person, place, and time. Cranial Nerves: No cranial nerve deficit. Deep Tendon Reflexes: Reflexes are normal and symmetric.        Health Maintenance   Topic Date Due    Hepatitis C screen  1955    HIV screen  11/14/1972    DTaP/Tdap/Td vaccine (1 - Tdap) 11/14/1976    Cervical cancer screen  07/01/2018    Potassium monitoring  09/05/2019    Creatinine monitoring  09/05/2019    A1C test (Diabetic or Prediabetic)  09/30/2020    Lipid screen  09/30/2020    Diabetic foot exam  10/29/2020    Diabetic microalbuminuria test  10/29/2020    Colon cancer screen colonoscopy  11/20/2020    Diabetic retinal exam  02/18/2021    Breast cancer screen  11/21/2021    Flu vaccine  Completed    Shingles Vaccine  Completed    Pneumococcal 0-64 years Vaccine  Completed    Hepatitis A vaccine  Aged Out    Hib vaccine  Aged Out    Meningococcal (ACWY) vaccine  Aged Out     Lab Results   Component Value Date    CHOL 161 09/30/2019    CHOL 162 09/05/2018    CHOL 166 08/25/2017     Lab Results   Component Value Date    TRIG 129 09/30/2019    TRIG 130 09/05/2018    TRIG 185 08/25/2017     Lab Results   Component Value Date    HDL 38 09/30/2019    HDL 41 09/05/2018    HDL 35 08/25/2017     Lab Results   Component Value Date    LDLCALC 97 09/30/2019    [address] 95 09/05/2018    LDLCALC 94 08/25/2017     Lab Results   Component Value Date    LABVLDL 32 (H) 01/21/2015    LABVLDL 31 (H) 09/19/2014    VLDL 26 09/30/2019    VLDL 26 09/05/2018    VLDL 37 08/25/2017     Lab Results   Component Value Date    CHOLHDLRATIO 4.2 09/30/2019    CHOLHDLRATIO 4.0 09/05/2018    CHOLHDLRATIO 4.7 08/25/2017     Lab Results   Component Value Date     09/05/2018    K 5.0 09/05/2018     09/05/2018    CO2 25 09/29/2016    BUN 17 09/05/2018    CREATININE 50 10/29/2019    GLUCOSE 184 09/05/2018    CALCIUM 9.2 09/05/2018    PROT 7.0 09/19/2014    LABALBU 3.9 09/05/2018    BILITOT 0.3 09/05/2018    ALKPHOS 116 09/05/2018    AST 8 09/05/2018    ALT 11 09/05/2018    LABGLOM 43 09/05/2018       Lab Results   Component Value Date    LABA1C 8.9 09/30/2019     No results found for: EAG      Assessment:      60year old well exam      Plan:      Refill meds  Reviewed blood work  Urine micral  Encouraged diet, exercise and weight loss. Reviewed health maintenance    Danna Wheeler received counseling on the following healthy behaviors: nutrition, exercise and medication adherence    Patient given educational materials on Diabetes, Hyperlipidemia, Nutrition, Exercise and Hypertension    I have instructed Danna Wheeler to complete a self tracking handout on Blood Sugars , Blood Pressures  and Weights and instructed them to bring it with them to her next appointment. Discussed use, benefit, and side effects of prescribed medications. Barriers to medication compliance addressed. All patient questions answered. Pt voiced understanding.              Emmie Thompson MD

## 2020-11-04 ENCOUNTER — OFFICE VISIT (OUTPATIENT)
Dept: PULMONOLOGY | Age: 65
End: 2020-11-04
Payer: COMMERCIAL

## 2020-11-04 VITALS
WEIGHT: 284 LBS | OXYGEN SATURATION: 95 % | TEMPERATURE: 96.9 F | BODY MASS INDEX: 45.64 KG/M2 | HEART RATE: 69 BPM | HEIGHT: 66 IN | DIASTOLIC BLOOD PRESSURE: 76 MMHG | SYSTOLIC BLOOD PRESSURE: 128 MMHG

## 2020-11-04 PROCEDURE — 99214 OFFICE O/P EST MOD 30 MIN: CPT | Performed by: INTERNAL MEDICINE

## 2020-11-04 NOTE — PATIENT INSTRUCTIONS
Recommendations/Plan:  -Will change her current Auto CPAP pressure settings I.e from Minimum CPAP of 6cm H20 and Maximum CPAP of 20cm H20 settings to a fixed CPAP pressure of 11cm H20 with EPR of 2.  -She was advised to continue current positive airway pressure therapy with above described pressure.  -She was advised to keep good compliance with current recommended pressure to get optimal results and clinical improvement.  -Follow with my clinic in 4months for clinical reevaluation with review of download. -She was advised to call [employer] regarding supplies if needed.  -She was advised to call my office for earlier appointment if needed for worsening of sleep symptoms.  -She was advised to continue to practice good sleep hygiene practices.  -She was advised to loose weight by controlling diet and doing exercise once cleared by her family physician. -Saw Mullins was educated about my impression and plan.  Patient verbalizes understanding

## 2021-01-01 ENCOUNTER — HOSPITAL ENCOUNTER (OUTPATIENT)
Dept: INTERVENTIONAL RADIOLOGY/VASCULAR | Age: 66
Discharge: HOME | End: 2021-10-20
Payer: COMMERCIAL

## 2021-01-01 ENCOUNTER — TELEPHONE (OUTPATIENT)
Dept: FAMILY MEDICINE CLINIC | Age: 66
End: 2021-01-01

## 2021-01-01 ENCOUNTER — OFFICE VISIT (OUTPATIENT)
Dept: FAMILY MEDICINE CLINIC | Age: 66
End: 2021-01-01
Payer: COMMERCIAL

## 2021-01-01 VITALS
DIASTOLIC BLOOD PRESSURE: 74 MMHG | TEMPERATURE: 97.4 F | HEART RATE: 86 BPM | SYSTOLIC BLOOD PRESSURE: 120 MMHG | OXYGEN SATURATION: 97 % | RESPIRATION RATE: 18 BRPM | HEIGHT: 65 IN | BODY MASS INDEX: 38.89 KG/M2 | WEIGHT: 233.4 LBS

## 2021-01-01 DIAGNOSIS — E11.22 TYPE 2 DIABETES MELLITUS WITH STAGE 3A CHRONIC KIDNEY DISEASE, WITHOUT LONG-TERM CURRENT USE OF INSULIN (HCC): Primary | ICD-10-CM

## 2021-01-01 DIAGNOSIS — F32.A DEPRESSION, UNSPECIFIED DEPRESSION TYPE: ICD-10-CM

## 2021-01-01 DIAGNOSIS — R51.9 NONINTRACTABLE HEADACHE, UNSPECIFIED CHRONICITY PATTERN, UNSPECIFIED HEADACHE TYPE: ICD-10-CM

## 2021-01-01 DIAGNOSIS — N18.31 TYPE 2 DIABETES MELLITUS WITH STAGE 3A CHRONIC KIDNEY DISEASE, WITHOUT LONG-TERM CURRENT USE OF INSULIN (HCC): ICD-10-CM

## 2021-01-01 DIAGNOSIS — E78.00 HYPERCHOLESTEROLEMIA: ICD-10-CM

## 2021-01-01 DIAGNOSIS — N18.31 TYPE 2 DIABETES MELLITUS WITH STAGE 3A CHRONIC KIDNEY DISEASE, WITHOUT LONG-TERM CURRENT USE OF INSULIN (HCC): Primary | ICD-10-CM

## 2021-01-01 DIAGNOSIS — F51.01 PRIMARY INSOMNIA: ICD-10-CM

## 2021-01-01 DIAGNOSIS — E11.22 TYPE 2 DIABETES MELLITUS WITH STAGE 3A CHRONIC KIDNEY DISEASE, WITHOUT LONG-TERM CURRENT USE OF INSULIN (HCC): ICD-10-CM

## 2021-01-01 DIAGNOSIS — E11.22 TYPE 2 DIABETES MELLITUS WITH STAGE 3 CHRONIC KIDNEY DISEASE, WITHOUT LONG-TERM CURRENT USE OF INSULIN, UNSPECIFIED WHETHER STAGE 3A OR 3B CKD (HCC): ICD-10-CM

## 2021-01-01 DIAGNOSIS — N18.30 TYPE 2 DIABETES MELLITUS WITH STAGE 3 CHRONIC KIDNEY DISEASE, WITHOUT LONG-TERM CURRENT USE OF INSULIN, UNSPECIFIED WHETHER STAGE 3A OR 3B CKD (HCC): ICD-10-CM

## 2021-01-01 DIAGNOSIS — Z23 NEED FOR INFLUENZA VACCINATION: ICD-10-CM

## 2021-01-01 DIAGNOSIS — I82.4Z2 DEEP VEIN THROMBOSIS (DVT) OF DISTAL VEIN OF LEFT LOWER EXTREMITY, UNSPECIFIED CHRONICITY (HCC): ICD-10-CM

## 2021-01-01 DIAGNOSIS — L01.00 IMPETIGO: ICD-10-CM

## 2021-01-01 DIAGNOSIS — Z00.00 WELL ADULT EXAM: Primary | ICD-10-CM

## 2021-01-01 LAB
AVERAGE GLUCOSE: 117 MG/DL (ref 70–126)
CHOLESTEROL, TOTAL: 188 MG/DL (ref 100–199)
HBA1C MFR BLD: 5.9 % (ref 4.4–6.4)
HDLC SERPL-MCNC: 42 MG/DL
LDL CHOLESTEROL CALCULATED: 98 MG/DL
MICROALB/CREAT RATIO POC: ABNORMAL MG/G
MICROALBUMIN/CREAT UR-RTO: 150 MG/L
POC CREATININE: 300 MG/DL
TRIGL SERPL-MCNC: 238 MG/DL (ref 0–199)

## 2021-01-01 PROCEDURE — 82044 UR ALBUMIN SEMIQUANTITATIVE: CPT | Performed by: FAMILY MEDICINE

## 2021-01-01 PROCEDURE — 93971 EXTREMITY STUDY: CPT

## 2021-01-01 PROCEDURE — 90471 IMMUNIZATION ADMIN: CPT | Performed by: FAMILY MEDICINE

## 2021-01-01 PROCEDURE — 90674 CCIIV4 VAC NO PRSV 0.5 ML IM: CPT | Performed by: FAMILY MEDICINE

## 2021-01-01 PROCEDURE — 99396 PREV VISIT EST AGE 40-64: CPT | Performed by: FAMILY MEDICINE

## 2021-01-01 RX ORDER — TRAZODONE HYDROCHLORIDE 50 MG/1
TABLET ORAL
Qty: 270 TABLET | Refills: 3 | Status: SHIPPED | OUTPATIENT
Start: 2021-01-01 | End: 2022-01-01 | Stop reason: SDUPTHER

## 2021-01-01 RX ORDER — CITALOPRAM 20 MG/1
TABLET ORAL
Qty: 90 TABLET | Refills: 3 | OUTPATIENT
Start: 2021-01-01

## 2021-01-01 RX ORDER — MUPIROCIN CALCIUM 20 MG/G
CREAM TOPICAL
Qty: 30 G | Refills: 1 | Status: SHIPPED | OUTPATIENT
Start: 2021-01-01 | End: 2021-01-01

## 2021-01-01 RX ORDER — PROPRANOLOL HYDROCHLORIDE 160 MG/1
CAPSULE, EXTENDED RELEASE ORAL
Qty: 90 CAPSULE | Refills: 3 | OUTPATIENT
Start: 2021-01-01

## 2021-01-01 RX ORDER — GLIMEPIRIDE 4 MG/1
TABLET ORAL
Qty: 180 TABLET | Refills: 3 | OUTPATIENT
Start: 2021-01-01

## 2021-01-01 RX ORDER — CITALOPRAM 20 MG/1
TABLET ORAL
Qty: 90 TABLET | Refills: 3 | Status: SHIPPED | OUTPATIENT
Start: 2021-01-01 | End: 2022-01-01 | Stop reason: SDUPTHER

## 2021-01-01 RX ORDER — GLIMEPIRIDE 4 MG/1
4 TABLET ORAL
Qty: 180 TABLET | Refills: 3 | Status: SHIPPED | OUTPATIENT
Start: 2021-01-01 | End: 2022-01-01 | Stop reason: SDUPTHER

## 2021-01-01 RX ORDER — PRAVASTATIN SODIUM 40 MG
40 TABLET ORAL DAILY
Qty: 90 TABLET | Refills: 3 | OUTPATIENT
Start: 2021-01-01

## 2021-01-01 RX ORDER — TRAZODONE HYDROCHLORIDE 50 MG/1
TABLET ORAL
Qty: 270 TABLET | Refills: 3 | OUTPATIENT
Start: 2021-01-01

## 2021-01-01 RX ORDER — PROPRANOLOL HYDROCHLORIDE 160 MG/1
CAPSULE, EXTENDED RELEASE ORAL
Qty: 90 CAPSULE | Refills: 3 | Status: SHIPPED | OUTPATIENT
Start: 2021-01-01 | End: 2022-01-01 | Stop reason: ALTCHOICE

## 2021-01-01 ASSESSMENT — PATIENT HEALTH QUESTIONNAIRE - PHQ9
SUM OF ALL RESPONSES TO PHQ QUESTIONS 1-9: 0
SUM OF ALL RESPONSES TO PHQ QUESTIONS 1-9: 0
2. FEELING DOWN, DEPRESSED OR HOPELESS: 0
1. LITTLE INTEREST OR PLEASURE IN DOING THINGS: 0
SUM OF ALL RESPONSES TO PHQ QUESTIONS 1-9: 0
SUM OF ALL RESPONSES TO PHQ9 QUESTIONS 1 & 2: 0

## 2021-01-01 ASSESSMENT — ENCOUNTER SYMPTOMS
BLOOD IN STOOL: 0
VOMITING: 0
BACK PAIN: 0
WHEEZING: 0
SORE THROAT: 0
ABDOMINAL PAIN: 0
COUGH: 0
DIARRHEA: 0
EYE PAIN: 0
CONSTIPATION: 0
RHINORRHEA: 0
SHORTNESS OF BREATH: 0
CHEST TIGHTNESS: 0
NAUSEA: 0

## 2021-01-06 ENCOUNTER — TELEPHONE (OUTPATIENT)
Dept: FAMILY MEDICINE CLINIC | Age: 66
End: 2021-01-06

## 2021-01-06 NOTE — LETTER
[address] [facility] [address] [address]  Dept: [phone]  Loc: [facility] [place]        1/6/2021    [address] [id number]      Dear Santy Crane: This letter is a reminder that your tests are due. Please take this lab slip that is attached to any lab of your choosing. As of June 1, 2020 we are requiring appointments for lab work in the [place] office. Please call us at [phone] to schedule an appointment. We are no longer accepting walk-in visits. If you've already underwent or scheduled these procedures, please disregard this notice.       Sincerely,        Mackey Duane, MD

## 2021-01-13 ENCOUNTER — TELEPHONE (OUTPATIENT)
Dept: FAMILY MEDICINE CLINIC | Age: 66
End: 2021-01-13

## 2021-01-13 ENCOUNTER — NURSE ONLY (OUTPATIENT)
Dept: LAB | Age: 66
End: 2021-01-13

## 2021-01-13 DIAGNOSIS — E11.22 TYPE 2 DIABETES MELLITUS WITH STAGE 3 CHRONIC KIDNEY DISEASE, WITHOUT LONG-TERM CURRENT USE OF INSULIN, UNSPECIFIED WHETHER STAGE 3A OR 3B CKD (HCC): Primary | ICD-10-CM

## 2021-01-13 DIAGNOSIS — N18.30 TYPE 2 DIABETES MELLITUS WITH STAGE 3 CHRONIC KIDNEY DISEASE, WITHOUT LONG-TERM CURRENT USE OF INSULIN, UNSPECIFIED WHETHER STAGE 3A OR 3B CKD (HCC): Primary | ICD-10-CM

## 2021-01-13 DIAGNOSIS — E11.22 TYPE 2 DIABETES MELLITUS WITH STAGE 3A CHRONIC KIDNEY DISEASE, WITHOUT LONG-TERM CURRENT USE OF INSULIN (HCC): ICD-10-CM

## 2021-01-13 DIAGNOSIS — N18.31 TYPE 2 DIABETES MELLITUS WITH STAGE 3A CHRONIC KIDNEY DISEASE, WITHOUT LONG-TERM CURRENT USE OF INSULIN (HCC): ICD-10-CM

## 2021-01-13 LAB
AVERAGE GLUCOSE: 222 MG/DL (ref 70–126)
HBA1C MFR BLD: 9.4 % (ref 4.4–6.4)

## 2021-01-13 NOTE — TELEPHONE ENCOUNTER
----- Message from Mackey Duane, MD sent at 1/13/2021  3:30 PM EST -----  a1c is higher at 9.4. Need to add med. Maxapollo on amaryl. Verify can't take metformin? ? Has she tried any others?

## 2021-01-14 NOTE — TELEPHONE ENCOUNTER
Patient left a VM returning our call, attempted to call pt back on cell number per her request, no answer and VM is full so unable to leave a message.

## 2021-02-08 DIAGNOSIS — G43.911 INTRACTABLE MIGRAINE WITH STATUS MIGRAINOSUS, UNSPECIFIED MIGRAINE TYPE: ICD-10-CM

## 2021-02-08 RX ORDER — HYDROCODONE BITARTRATE AND ACETAMINOPHEN 5; 325 MG/1; MG/1
1 TABLET ORAL EVERY 6 HOURS PRN
Qty: 20 TABLET | Refills: 0 | Status: SHIPPED | OUTPATIENT
Start: 2021-02-08 | End: 2021-05-12 | Stop reason: SDUPTHER

## 2021-02-08 RX ORDER — KETOROLAC TROMETHAMINE 10 MG/1
10 TABLET, FILM COATED ORAL EVERY 6 HOURS PRN
Qty: 20 TABLET | Refills: 0 | Status: SHIPPED | OUTPATIENT
Start: 2021-02-08 | End: 2021-05-12 | Stop reason: SDUPTHER

## 2021-02-08 NOTE — TELEPHONE ENCOUNTER
Cherie Graham needs refill of   Requested Prescriptions     Pending Prescriptions Disp Refills    ketorolac (TORADOL) 10 MG tablet 20 tablet 0     Sig: Take 1 tablet by mouth every 6 hours as needed for Pain Take with norco for migraine relief    HYDROcodone-acetaminophen (NORCO) 5-325 MG per tablet 20 tablet 0     Sig: Take 1 tablet by mouth every 6 hours as needed for Pain for up to 7 days. R51       Last Filled on: Toradol last filled 10/26/20 and norco was last filled on 08/27/2020.     Last Visit Date:  10/26/2020    Next Visit Date:    Visit date not found

## 2021-02-08 NOTE — TELEPHONE ENCOUNTER
ep'ed requested meds to pharm requested      Controlled Substance Monitoring:    Acute and Chronic Pain Monitoring:   RX Monitoring 2/8/2021   Attestation -   Periodic Controlled Substance Monitoring No signs of potential drug abuse or diversion identified.

## 2021-02-08 NOTE — TELEPHONE ENCOUNTER
Patient called to request the medications that you have given her for migraines in the past.  She thinks that they were Toradol and [place].  She was last seen in the office on 10/26/20. [facility] [address] [facility].   Call her back at [phone] only if any problems

## 2021-03-05 ENCOUNTER — TELEPHONE (OUTPATIENT)
Dept: PULMONOLOGY | Age: 66
End: 2021-03-05

## 2021-04-22 ENCOUNTER — TELEPHONE (OUTPATIENT)
Dept: FAMILY MEDICINE CLINIC | Age: 66
End: 2021-04-22

## 2021-05-10 DIAGNOSIS — G43.911 INTRACTABLE MIGRAINE WITH STATUS MIGRAINOSUS, UNSPECIFIED MIGRAINE TYPE: ICD-10-CM

## 2021-05-10 NOTE — TELEPHONE ENCOUNTER
----- Message from Angie Genejavier sent at 5/7/2021  2:02 PM EDT -----  Subject: Refill Request    QUESTIONS  Name of Medication? HYDROcodone-acetaminophen (NORCO) 5-325 MG per tablet  Patient-reported dosage and instructions? Take 1 tab po q 6 prn   How many days do you have left? 5  Preferred Pharmacy? [email or web address] phone number (if available)? [phone]  ---------------------------------------------------------------------------  --------------,  Name of Medication? ketorolac (TORADOL) 10 MG tablet  Patient-reported dosage and instructions? 10 mg tab Take 1 tab po q 6 prn   for pain  How many days do you have left? 0  Preferred Pharmacy? [email or web address] phone number (if available)? [phone]  ---------------------------------------------------------------------------  --------------  CALL BACK INFO  What is the best way for the office to contact you? OK to leave message on   voicemail  Preferred Call Back Phone Number?  [phone]

## 2021-05-12 RX ORDER — KETOROLAC TROMETHAMINE 10 MG/1
10 TABLET, FILM COATED ORAL EVERY 6 HOURS PRN
Qty: 20 TABLET | Refills: 0 | Status: SHIPPED | OUTPATIENT
Start: 2021-05-12 | End: 2021-05-30

## 2021-05-12 RX ORDER — HYDROCODONE BITARTRATE AND ACETAMINOPHEN 5; 325 MG/1; MG/1
1 TABLET ORAL EVERY 6 HOURS PRN
Qty: 20 TABLET | Refills: 0 | Status: SHIPPED | OUTPATIENT
Start: 2021-05-12 | End: 2021-05-19

## 2021-05-12 NOTE — TELEPHONE ENCOUNTER
Patient made appointment for 5/17/21 @4.  Medicine needs sent to [facility] on [address]

## 2021-05-13 ENCOUNTER — NURSE ONLY (OUTPATIENT)
Dept: LAB | Age: 66
End: 2021-05-13

## 2021-05-13 ENCOUNTER — TELEPHONE (OUTPATIENT)
Dept: FAMILY MEDICINE CLINIC | Age: 66
End: 2021-05-13

## 2021-05-13 DIAGNOSIS — E11.22 TYPE 2 DIABETES MELLITUS WITH STAGE 3 CHRONIC KIDNEY DISEASE, WITHOUT LONG-TERM CURRENT USE OF INSULIN, UNSPECIFIED WHETHER STAGE 3A OR 3B CKD (HCC): ICD-10-CM

## 2021-05-13 DIAGNOSIS — N18.30 TYPE 2 DIABETES MELLITUS WITH STAGE 3 CHRONIC KIDNEY DISEASE, WITHOUT LONG-TERM CURRENT USE OF INSULIN, UNSPECIFIED WHETHER STAGE 3A OR 3B CKD (HCC): ICD-10-CM

## 2021-05-13 LAB
ANION GAP SERPL CALCULATED.3IONS-SCNC: 11 MEQ/L (ref 8–16)
AVERAGE GLUCOSE: 189 MG/DL (ref 70–126)
BUN BLDV-MCNC: 18 MG/DL (ref 7–22)
CALCIUM SERPL-MCNC: 9.4 MG/DL (ref 8.5–10.5)
CHLORIDE BLD-SCNC: 101 MEQ/L (ref 98–111)
CO2: 26 MEQ/L (ref 23–33)
CREAT SERPL-MCNC: 1.1 MG/DL (ref 0.4–1.2)
GFR SERPL CREATININE-BSD FRML MDRD: 50 ML/MIN/1.73M2
GLUCOSE BLD-MCNC: 202 MG/DL (ref 70–108)
HBA1C MFR BLD: 8.3 % (ref 4.4–6.4)
POTASSIUM SERPL-SCNC: 4.3 MEQ/L (ref 3.5–5.2)
SODIUM BLD-SCNC: 138 MEQ/L (ref 135–145)

## 2021-05-13 NOTE — TELEPHONE ENCOUNTER
----- Message from Sofi Horton MD sent at 5/13/2021  4:02 PM EDT -----  BMP is ok, glucose at 202. A1C is improved, great job, 9.4 to 8.3. Keep it up. Recheck a1c in 3 months.   7s are next !!

## 2021-05-17 ENCOUNTER — OFFICE VISIT (OUTPATIENT)
Dept: FAMILY MEDICINE CLINIC | Age: 66
End: 2021-05-17
Payer: COMMERCIAL

## 2021-05-17 VITALS
HEART RATE: 70 BPM | OXYGEN SATURATION: 98 % | SYSTOLIC BLOOD PRESSURE: 126 MMHG | TEMPERATURE: 97.3 F | RESPIRATION RATE: 18 BRPM | BODY MASS INDEX: 46.32 KG/M2 | DIASTOLIC BLOOD PRESSURE: 82 MMHG | WEIGHT: 287 LBS

## 2021-05-17 DIAGNOSIS — E11.22 TYPE 2 DIABETES MELLITUS WITH STAGE 3 CHRONIC KIDNEY DISEASE, WITHOUT LONG-TERM CURRENT USE OF INSULIN, UNSPECIFIED WHETHER STAGE 3A OR 3B CKD (HCC): ICD-10-CM

## 2021-05-17 DIAGNOSIS — N18.30 TYPE 2 DIABETES MELLITUS WITH STAGE 3 CHRONIC KIDNEY DISEASE, WITHOUT LONG-TERM CURRENT USE OF INSULIN, UNSPECIFIED WHETHER STAGE 3A OR 3B CKD (HCC): ICD-10-CM

## 2021-05-17 DIAGNOSIS — G43.911 INTRACTABLE MIGRAINE WITH STATUS MIGRAINOSUS, UNSPECIFIED MIGRAINE TYPE: Primary | ICD-10-CM

## 2021-05-17 DIAGNOSIS — L01.00 IMPETIGO: ICD-10-CM

## 2021-05-17 PROCEDURE — 3052F HG A1C>EQUAL 8.0%<EQUAL 9.0%: CPT | Performed by: FAMILY MEDICINE

## 2021-05-17 PROCEDURE — 99214 OFFICE O/P EST MOD 30 MIN: CPT | Performed by: FAMILY MEDICINE

## 2021-05-17 RX ORDER — CLINDAMYCIN HYDROCHLORIDE 300 MG/1
300 CAPSULE ORAL 4 TIMES DAILY
Qty: 40 CAPSULE | Refills: 0 | Status: SHIPPED | OUTPATIENT
Start: 2021-05-17 | End: 2021-05-27

## 2021-05-17 ASSESSMENT — ENCOUNTER SYMPTOMS
NAUSEA: 0
VOMITING: 0
ABDOMINAL PAIN: 0
WHEEZING: 0
CHEST TIGHTNESS: 0
DIARRHEA: 0
BLOOD IN STOOL: 0
SORE THROAT: 0
CONSTIPATION: 0
COUGH: 0
EYE PAIN: 0
BACK PAIN: 0
SHORTNESS OF BREATH: 0
RHINORRHEA: 0

## 2021-05-17 NOTE — PATIENT INSTRUCTIONS
Patient Education        Type 2 Diabetes: Care Instructions  Your Care Instructions     Type 2 diabetes is a disease that develops when the body's tissues cannot use insulin properly. Over time, the pancreas cannot make enough insulin. Insulin is a hormone that helps the body's cells use sugar (glucose) for energy. It also helps the body store extra sugar in muscle, fat, and liver cells. Without insulin, the sugar cannot get into the cells to do its work. It stays in the blood instead. This can cause high blood sugar levels. A person has diabetes when the blood sugar stays too high too much of the time. Over time, diabetes can lead to diseases of the heart, blood vessels, nerves, kidneys, and eyes. You may be able to control your blood sugar by losing weight, eating a healthy diet, and getting daily exercise. You may also have to take insulin or other diabetes medicine. Follow-up care is a key part of your treatment and safety. Be sure to make and go to all appointments. Call your doctor if you are having problems. It's also a good idea to know your test results and keep a list of the medicines you take. How can you care for yourself at home? · Keep your blood sugar at a target level (which you set with your doctor). ? Carbohydratethe body's main source of fuelaffects blood sugar more than any other nutrient. Carbohydrate is in fruits, vegetables, milk, and yogurt. It also is in breads, cereals, vegetables such as potatoes and corn, and sugary foods such as candy and cakes. Follow your meal plan to know how much carbohydrate to eat at each meal and snack. ? Aim for 30 minutes of exercise on most, preferably all, days of the week. Walking is a good choice. You also may want to do other activities, such as running, swimming, cycling, or playing tennis or team sports. Try to do muscle-strengthening exercises at least 2 times a week. ? Take your medicines exactly as prescribed.  Call your doctor if you think in to your [employer] account. Enter C553 in the [facility] box to learn more about \"Type 2 Diabetes: Care Instructions. \"     If you do not have an account, please click on the \"Sign Up Now\" link. Current as of: August 31, 2020               Content Version: 12.8  © 2006-2021 [employer]. Care instructions adapted under license by [facility] ([facility]). If you have questions about a medical condition or this instruction, always ask your healthcare professional. [place] [address] any warranty or liability for your use of this information. Patient Education        Impetigo: Care Instructions  Your Care Instructions  Impetigo (say \"is-fic-NY-go\") is a skin infection caused by bacteria. It causes blisters that break and become oozing, yellow, crusty sores. Impetigo can be anywhere on the body. Scratching the sores may spread the infection to other parts of the body. You can also spread it to others through close contact or when you share towels, clothing, and other items. Prescription antibiotic ointment or pills can usually cure impetigo. (After a day of antibiotics, the infection should not spread.)  Follow-up care is a key part of your treatment and safety. Be sure to make and go to all appointments, and call your doctor if you are having problems. It's also a good idea to know your test results and keep a list of the medicines you take. How can you care for yourself at home? · Apply antibiotic ointment exactly as instructed. · If your doctor prescribed antibiotic pills, take them as directed. Do not stop using them just because you feel better. You need to take the full course of antibiotics. · Gently wash the sores with soap and water each day. If crusts form, your doctor may advise you to soften or remove the crusts. You can do this by soaking them in warm water and patting them dry. This can help the cream or ointment treat impetigo.   · After you touch the area, wash your hands with soap and water. Or you can use an alcohol-based hand . · Don't share items such as towels, sheets, and clothing until the infection is gone. · Wash anything that may have touched the infected area. · Try to avoid scratching the area. When should you call for help? Call your doctor now or seek immediate medical care if:    · You have symptoms of a worse infection, such as:  ? Increased pain, swelling, warmth, or redness. ? Red streaks leading from the area. ? Pus draining from the area. ? A fever.     · Impetigo gets worse or spreads to other areas. Watch closely for changes in your health, and be sure to contact your doctor if:    · You do not get better as expected. Where can you learn more? Go to https://Total Eclipsepetonieb.[employer]. org and sign in to your [employer] account. Enter S242 in the [employer] box to learn more about \"Impetigo: Care Instructions. \"     If you do not have an account, please click on the \"Sign Up Now\" link. Current as of: May 27, 2020               Content Version: 12.8  © 2006-2021 [email or web address]. Care instructions adapted under license by [facility] ([facility]). If you have questions about a medical condition or this instruction, always ask your healthcare professional. [place] [address] any warranty or liability for your use of this information. Patient Education        Migraine Headache: Care Instructions  Overview     Migraines are painful, throbbing headaches that often start on one side of the head. They may cause nausea and vomiting and make you sensitive to light, sound, or smell. Without treatment, migraines can last from 4 hours to a few days. Medicines can help prevent migraines or stop them after they have started. Your doctor can help you find which ones work best for you. Follow-up care is a key part of your treatment and safety.  Be sure to make and go to all appointments, and call your doctor if you are having problems. It's also a good idea to know your test results and keep a list of the medicines you take. How can you care for yourself at home? · Do not drive if you have taken a prescription pain medicine. · Rest in a quiet, dark room until your headache is gone. Close your eyes, and try to relax or go to sleep. Don't watch TV or read. · Put a cold, moist cloth or cold pack on the painful area for 10 to 20 minutes at a time. Put a thin cloth between the cold pack and your skin. · Use a warm, moist towel or a heating pad set on low to relax tight shoulder and neck muscles. · Have someone gently massage your neck and shoulders. · Take your medicines exactly as prescribed. Call your doctor if you think you are having a problem with your medicine. You will get more details on the specific medicines your doctor prescribes. · Don't take medicine for headache pain too often. Talk to your doctor if you are taking medicine more than 2 days a week to stop a headache. Taking too much pain medicine can lead to more headaches. These are called medicine-overuse headaches. To prevent migraines  · Keep a headache diary so you can figure out what triggers your headaches. Avoiding triggers may help you prevent headaches. Record when each headache began, how long it lasted, and what the pain was like. Write down any other symptoms you had with the headache, such as nausea, flashing lights or dark spots, or sensitivity to bright light or loud noise. Note if the headache occurred near your period. List anything that might have triggered the headache. Triggers may include certain foods (chocolate, cheese, wine) or odors, smoke, bright light, stress, or lack of sleep. · If your doctor has prescribed medicine for your migraines, take it as directed. You may have medicine that you take only when you get a migraine and medicine that you take all the time to help prevent migraines.   ? If your doctor has prescribed medicine for when you get a headache, take it at the first sign of a migraine, unless your doctor has given you other instructions. ? If your doctor has prescribed medicine to prevent migraines, take it exactly as prescribed. Call your doctor if you think you are having a problem with your medicine. · Find healthy ways to deal with stress. Migraines are most common during or right after stressful times. Try finding ways to reduce stress like practicing mindfulness or deep breathing exercises. · Get plenty of sleep and exercise. But be careful to not push yourself too hard during exercise. It may trigger a headache. · Eat meals on a regular schedule. Avoid foods and drinks that often trigger migraines. These include chocolate, alcohol (especially red wine and port), aspartame, monosodium glutamate (MSG), and some additives found in foods (such as hot dogs, lau, cold cuts, aged cheeses, and pickled foods). · Limit caffeine. Don't drink too much coffee, tea, or soda. But don't quit caffeine suddenly. That can also give you migraines. · Do not smoke or allow others to smoke around you. If you need help quitting, talk to your doctor about stop-smoking programs and medicines. These can increase your chances of quitting for good. · If you are taking birth control pills or hormone therapy, talk to your doctor about whether they are triggering your migraines. When should you call for help? Call 911 anytime you think you may need emergency care. For example, call if:    · You have signs of a stroke. These may include:  ? Sudden numbness, paralysis, or weakness in your face, arm, or leg, especially on only one side of your body. ? Sudden vision changes. ? Sudden trouble speaking. ? Sudden confusion or trouble understanding simple statements. ? Sudden problems with walking or balance. ? A sudden, severe headache that is different from past headaches.    Call your doctor now or seek immediate medical care if:    · You have new or worse nausea and vomiting.     · You have a new or higher fever.     · Your headache gets much worse. Watch closely for changes in your health, and be sure to contact your doctor if:    · You are not getting better after 2 days (48 hours). Where can you learn more? Go to https://chpepiceweb.[employer]. org and sign in to your [employer] account. Enter G586 in the [employer] box to learn more about \"Migraine Headache: Care Instructions. \"     If you do not have an account, please click on the \"Sign Up Now\" link. Current as of: August 4, 2020               Content Version: 12.8  © 2006-2021 [employer]. Care instructions adapted under license by [facility] ([facility]). If you have questions about a medical condition or this instruction, always ask your healthcare professional. [address] any warranty or liability for your use of this information.

## 2021-05-17 NOTE — PROGRESS NOTES
Júnior Wagner (:  1955) is a 63 y.o. female,Established patient, here for evaluation of the following chief complaint(s): Annual Exam (refills)         ASSESSMENT/PLAN:  1. Intractable migraine with status migrainosus, unspecified migraine type  -stable on prn norco, toradol. Discussed trial of indocin since can get more than the toradol. Continue propranolol for prophylaxis. 2. Type 2 diabetes mellitus with stage 3 chronic kidney disease, without long-term current use of insulin, unspecified whether stage 3a or 3b CKD (HCC)  -     metFORMIN (GLUCOPHAGE) 500 MG tablet; Take 1 tablet by mouth 2 times daily (with meals), Disp-180 tablet, R-1Normal  -stable,   Lab Results   Component Value Date    LABA1C 8.3 (H) 2021     No results found for: EAG    3. Impetigo  -     clindamycin (CLEOCIN) 300 MG capsule; Take 1 capsule by mouth 4 times daily for 10 days, Disp-40 capsule, R-0Normal  - failed bactroban ointment, mask is probably playing a role, trial of oral clindamycin, call if not improving. No follow-ups on file. Subjective   SUBJECTIVE/OBJECTIVE:  HPI   Pt here for 6 month follow up of refills for migraines. Using as needed. Reviewed BMI of 46. Encouraged diet, exercise and weight loss. Works well for migraines. , former smoker, pmh reviewed. Review of Systems   Constitutional: Negative for chills, fatigue, fever and unexpected weight change. HENT: Negative for congestion, ear pain, rhinorrhea and sore throat. Eyes: Negative for pain and visual disturbance. Respiratory: Negative for cough, chest tightness, shortness of breath and wheezing. Cardiovascular: Negative for chest pain and palpitations. Gastrointestinal: Negative for abdominal pain, blood in stool, constipation, diarrhea, nausea and vomiting. Genitourinary: Negative for difficulty urinating, frequency, hematuria and urgency.    Musculoskeletal: Negative for back pain, joint swelling, myalgias and neck pain. Skin: Positive for rash. Neurological: Positive for headaches. Negative for dizziness. Hematological: Negative for adenopathy. Does not bruise/bleed easily. Psychiatric/Behavioral: Negative for behavioral problems and sleep disturbance. The patient is not nervous/anxious. Objective   Physical Exam  Vitals and nursing note reviewed. Constitutional:       Appearance: She is well-developed. HENT:      Head: Normocephalic and atraumatic. Right Ear: External ear normal.      Left Ear: External ear normal.      Nose: Nose normal.      Mouth/Throat:      Mouth: Mucous membranes are moist.   Eyes:      Pupils: Pupils are equal, round, and reactive to light. Neck:      Thyroid: No thyromegaly. Cardiovascular:      Rate and Rhythm: Normal rate and regular rhythm. Heart sounds: Normal heart sounds. Pulmonary:      Breath sounds: Normal breath sounds. No wheezing or rales. Abdominal:      General: Bowel sounds are normal.      Palpations: Abdomen is soft. Tenderness: There is no abdominal tenderness. There is no guarding or rebound. Musculoskeletal:         General: Normal range of motion. Cervical back: Neck supple. Lymphadenopathy:      Cervical: No cervical adenopathy. Skin:     General: Skin is warm and dry. Findings: No rash. Neurological:      Mental Status: She is alert and oriented to person, place, and time. Cranial Nerves: No cranial nerve deficit. Deep Tendon Reflexes: Reflexes are normal and symmetric. An electronic signature was used to authenticate this note.     --Lion Rizo MD

## 2021-05-29 ENCOUNTER — HOSPITAL ENCOUNTER (EMERGENCY)
Age: 66
Discharge: HOME | End: 2021-05-30
Payer: COMMERCIAL

## 2021-05-29 ENCOUNTER — APPOINTMENT (OUTPATIENT)
Dept: CT IMAGING | Age: 66
End: 2021-05-29
Payer: COMMERCIAL

## 2021-05-29 ENCOUNTER — HOSPITAL ENCOUNTER (EMERGENCY)
Age: 66
Discharge: HOME | End: 2021-05-29
Payer: COMMERCIAL

## 2021-05-29 VITALS
OXYGEN SATURATION: 95 % | WEIGHT: 280 LBS | DIASTOLIC BLOOD PRESSURE: 68 MMHG | BODY MASS INDEX: 45 KG/M2 | SYSTOLIC BLOOD PRESSURE: 168 MMHG | TEMPERATURE: 98.3 F | RESPIRATION RATE: 16 BRPM | HEIGHT: 66 IN | HEART RATE: 70 BPM

## 2021-05-29 VITALS
HEART RATE: 78 BPM | TEMPERATURE: 98.3 F | DIASTOLIC BLOOD PRESSURE: 77 MMHG | OXYGEN SATURATION: 98 % | SYSTOLIC BLOOD PRESSURE: 145 MMHG | RESPIRATION RATE: 16 BRPM

## 2021-05-29 DIAGNOSIS — R31.0 HEMATURIA, GROSS: Primary | ICD-10-CM

## 2021-05-29 DIAGNOSIS — N28.89 BILATERAL RENAL MASSES: ICD-10-CM

## 2021-05-29 DIAGNOSIS — R31.0 GROSS HEMATURIA: Primary | ICD-10-CM

## 2021-05-29 LAB
ALBUMIN SERPL-MCNC: 4 G/DL (ref 3.5–5.1)
ALP BLD-CCNC: 107 U/L (ref 38–126)
ALT SERPL-CCNC: 10 U/L (ref 11–66)
ANION GAP SERPL CALCULATED.3IONS-SCNC: 13 MEQ/L (ref 8–16)
AST SERPL-CCNC: 11 U/L (ref 5–40)
BACTERIA: ABNORMAL
BACTERIA: ABNORMAL /HPF
BASOPHILS # BLD: 0.4 %
BASOPHILS ABSOLUTE: 0 THOU/MM3 (ref 0–0.1)
BILIRUB SERPL-MCNC: 0.3 MG/DL (ref 0.3–1.2)
BILIRUBIN DIRECT: < 0.2 MG/DL (ref 0–0.3)
BILIRUBIN URINE: NEGATIVE
BILIRUBIN URINE: NEGATIVE
BLOOD, URINE: ABNORMAL
BLOOD, URINE: ABNORMAL
BUN BLDV-MCNC: 24 MG/DL (ref 7–22)
CALCIUM SERPL-MCNC: 9.4 MG/DL (ref 8.5–10.5)
CASTS 2: ABNORMAL /LPF
CASTS UA: ABNORMAL /LPF
CASTS: ABNORMAL /LPF
CASTS: ABNORMAL /LPF
CHARACTER, URINE: ABNORMAL
CHARACTER, URINE: ABNORMAL
CHLORIDE BLD-SCNC: 98 MEQ/L (ref 98–111)
CO2: 23 MEQ/L (ref 23–33)
COLOR: ABNORMAL
COLOR: ABNORMAL
CREAT SERPL-MCNC: 1.3 MG/DL (ref 0.4–1.2)
CRYSTALS, UA: ABNORMAL
CRYSTALS: ABNORMAL
EOSINOPHIL # BLD: 1.7 %
EOSINOPHILS ABSOLUTE: 0.2 THOU/MM3 (ref 0–0.4)
EPITHELIAL CELLS, UA: ABNORMAL /HPF
EPITHELIAL CELLS, UA: ABNORMAL /HPF
ERYTHROCYTE [DISTWIDTH] IN BLOOD BY AUTOMATED COUNT: 13.3 % (ref 11.5–14.5)
ERYTHROCYTE [DISTWIDTH] IN BLOOD BY AUTOMATED COUNT: 39.2 FL (ref 35–45)
GFR SERPL CREATININE-BSD FRML MDRD: 41 ML/MIN/1.73M2
GLUCOSE BLD-MCNC: 227 MG/DL (ref 70–108)
GLUCOSE URINE: NEGATIVE MG/DL
GLUCOSE, URINE: NEGATIVE MG/DL
HCT VFR BLD CALC: 36 % (ref 37–47)
HEMOGLOBIN: 11.6 GM/DL (ref 12–16)
IMMATURE GRANS (ABS): 0.04 THOU/MM3 (ref 0–0.07)
IMMATURE GRANULOCYTES: 0.3 %
KETONES, URINE: NEGATIVE
KETONES, URINE: NEGATIVE
LD: 157 U/L (ref 100–190)
LEUKOCYTE EST, POC: ABNORMAL
LEUKOCYTE ESTERASE, URINE: ABNORMAL
LIPASE: 13.1 U/L (ref 5.6–51.3)
LYMPHOCYTES # BLD: 14.2 %
LYMPHOCYTES ABSOLUTE: 1.7 THOU/MM3 (ref 1–4.8)
MCH RBC QN AUTO: 26.2 PG (ref 26–33)
MCHC RBC AUTO-ENTMCNC: 32.2 GM/DL (ref 32.2–35.5)
MCV RBC AUTO: 81.4 FL (ref 81–99)
MISCELLANEOUS 2: ABNORMAL
MISCELLANEOUS LAB TEST RESULT: ABNORMAL
MONOCYTES # BLD: 6 %
MONOCYTES ABSOLUTE: 0.7 THOU/MM3 (ref 0.4–1.3)
NITRITE, URINE: NEGATIVE
NITRITE, URINE: NEGATIVE
NUCLEATED RED BLOOD CELLS: 0 /100 WBC
OSMOLALITY CALCULATION: 279.4 MOSMOL/KG (ref 275–300)
PH UA: 5.5 (ref 5–9)
PH UA: 6.5 (ref 5–9)
PLATELET # BLD: 304 THOU/MM3 (ref 130–400)
PMV BLD AUTO: 9.3 FL (ref 9.4–12.4)
POTASSIUM REFLEX MAGNESIUM: 4.3 MEQ/L (ref 3.5–5.2)
PROTEIN UA: 100
PROTEIN UA: >= 300 MG/DL
RBC # BLD: 4.42 MILL/MM3 (ref 4.2–5.4)
RBC URINE: > 200 /HPF
RBC URINE: ABNORMAL /HPF
RENAL EPITHELIAL, UA: ABNORMAL
RENAL EPITHELIAL, UA: ABNORMAL
SEG NEUTROPHILS: 77.4 %
SEGMENTED NEUTROPHILS ABSOLUTE COUNT: 9.1 THOU/MM3 (ref 1.8–7.7)
SODIUM BLD-SCNC: 134 MEQ/L (ref 135–145)
SPECIFIC GRAVITY UA: 1.02 (ref 1–1.03)
SPECIFIC GRAVITY, URINE: 1.02 (ref 1–1.03)
TOTAL PROTEIN: 7.4 G/DL (ref 6.1–8)
UROBILINOGEN, URINE: 0.2 EU/DL (ref 0–1)
UROBILINOGEN, URINE: 0.2 EU/DL (ref 0–1)
WBC # BLD: 11.8 THOU/MM3 (ref 4.8–10.8)
WBC UA: ABNORMAL /HPF
WBC UA: ABNORMAL /HPF
YEAST: ABNORMAL
YEAST: ABNORMAL

## 2021-05-29 PROCEDURE — 85025 COMPLETE CBC W/AUTO DIFF WBC: CPT

## 2021-05-29 PROCEDURE — 96374 THER/PROPH/DIAG INJ IV PUSH: CPT

## 2021-05-29 PROCEDURE — 99213 OFFICE O/P EST LOW 20 MIN: CPT

## 2021-05-29 PROCEDURE — 83690 ASSAY OF LIPASE: CPT

## 2021-05-29 PROCEDURE — 51798 US URINE CAPACITY MEASURE: CPT

## 2021-05-29 PROCEDURE — 36415 COLL VENOUS BLD VENIPUNCTURE: CPT

## 2021-05-29 PROCEDURE — 80048 BASIC METABOLIC PNL TOTAL CA: CPT

## 2021-05-29 PROCEDURE — 6360000002 HC RX W HCPCS: Performed by: NURSE PRACTITIONER

## 2021-05-29 PROCEDURE — 81001 URINALYSIS AUTO W/SCOPE: CPT

## 2021-05-29 PROCEDURE — 99283 EMERGENCY DEPT VISIT LOW MDM: CPT

## 2021-05-29 PROCEDURE — 74176 CT ABD & PELVIS W/O CONTRAST: CPT

## 2021-05-29 PROCEDURE — 81003 URINALYSIS AUTO W/O SCOPE: CPT

## 2021-05-29 PROCEDURE — 87086 URINE CULTURE/COLONY COUNT: CPT

## 2021-05-29 PROCEDURE — 83615 LACTATE (LD) (LDH) ENZYME: CPT

## 2021-05-29 PROCEDURE — 80076 HEPATIC FUNCTION PANEL: CPT

## 2021-05-29 PROCEDURE — 99213 OFFICE O/P EST LOW 20 MIN: CPT | Performed by: NURSE PRACTITIONER

## 2021-05-29 PROCEDURE — 2580000003 HC RX 258: Performed by: NURSE PRACTITIONER

## 2021-05-29 RX ORDER — 0.9 % SODIUM CHLORIDE 0.9 %
1000 INTRAVENOUS SOLUTION INTRAVENOUS ONCE
Status: COMPLETED | OUTPATIENT
Start: 2021-05-29 | End: 2021-05-30

## 2021-05-29 RX ORDER — KETOROLAC TROMETHAMINE 30 MG/ML
30 INJECTION, SOLUTION INTRAMUSCULAR; INTRAVENOUS ONCE
Status: COMPLETED | OUTPATIENT
Start: 2021-05-29 | End: 2021-05-29

## 2021-05-29 RX ADMIN — SODIUM CHLORIDE 1000 ML: 9 INJECTION, SOLUTION INTRAVENOUS at 22:54

## 2021-05-29 RX ADMIN — KETOROLAC TROMETHAMINE 30 MG: 30 INJECTION, SOLUTION INTRAMUSCULAR; INTRAVENOUS at 23:19

## 2021-05-29 ASSESSMENT — ENCOUNTER SYMPTOMS
BACK PAIN: 0
RESPIRATORY NEGATIVE: 1
GASTROINTESTINAL NEGATIVE: 1

## 2021-05-29 ASSESSMENT — PAIN SCALES - GENERAL
PAINLEVEL_OUTOF10: 7
PAINLEVEL_OUTOF10: 3
PAINLEVEL_OUTOF10: 7

## 2021-05-29 ASSESSMENT — PAIN DESCRIPTION - LOCATION: LOCATION: BACK

## 2021-05-29 ASSESSMENT — PAIN DESCRIPTION - ORIENTATION: ORIENTATION: RIGHT

## 2021-05-29 ASSESSMENT — PAIN DESCRIPTION - FREQUENCY: FREQUENCY: CONTINUOUS

## 2021-05-29 ASSESSMENT — PAIN DESCRIPTION - PAIN TYPE: TYPE: ACUTE PAIN

## 2021-05-29 NOTE — ED PROVIDER NOTES
[facility]       CHIEF COMPLAINT       Chief Complaint   Patient presents with    Dysuria    Hematuria       Nurses Notes reviewed and I agree except as noted in the HPI. HISTORY OF PRESENT ILLNESS   Shaq Bob is a 63 y.o. female who presents urgent care with hematuria, difficulty urinating. She denies urinary frequency, urgency, burning, fevers, back pain. States she noted blood in her urine yesterday, feels like she is not emptying fully, intense pressure at the end of urination. 1 year ago patient had the same symptoms and had no follow-up as her symptoms resolved. Patient does not take any anticoagulation, he has does not have history of kidney stones. She has been taking pain away over-the-counter for discomfort. REVIEW OF SYSTEMS     Review of Systems   Constitutional: Negative. HENT: Negative. Respiratory: Negative. Cardiovascular: Negative. Gastrointestinal: Negative. Genitourinary: Positive for difficulty urinating (feels like doesn't empty fully ) and hematuria. Negative for dysuria, flank pain, pelvic pain and urgency. Gross hematuria    Musculoskeletal: Negative for back pain. PAST MEDICAL HISTORY         Diagnosis Date    Allergic rhinitis     CKD stage 3 due to type 2 diabetes mellitus ([address]     Depression     DM type 2 causing CKD stage 3 ([address]     onset 2013.  Headache(784.0)     Hypertension     Multinodular goiter     Persistent proteinuria associated with type 2 diabetes mellitus ([address] 10/2016    urine micral of 20.  Pure hypercholesterolemia        SURGICALHISTORY     Patient  has a past surgical history that includes Dilation and curettage of uterus (2016).     CURRENT MEDICATIONS       Discharge Medication List as of 5/29/2021  2:34 PM      CONTINUE these medications which have NOT CHANGED    Details   metFORMIN (GLUCOPHAGE) 500 MG tablet Take 1 tablet by mouth 2 times daily (with meals), Disp-180 tablet, R-1Normal      ketorolac (TORADOL) 10 MG tablet Take 1 tablet by mouth every 6 hours as needed for Pain, Disp-20 tablet, R-0Normal      CPAP Machine MISC Starting Wed 11/4/2020, Disp-1 each, R-0Chikis change her current Auto CPAP pressure settings I.e from Minimum CPAP of 6cm H20 and Maximum CPAP of 20cm H20 settings to a fixed CPAP pressure of 11cm H20 with EPR of 2.      albuterol sulfate  (90 Base) MCG/ACT inhaler Inhale 2 puffs into the lungs every 6 hours as needed for Wheezing, Disp-3 Inhaler,R-3Normal      propranolol (INDERAL LA) 160 MG extended release capsule TAKE 1 CAPSULE DAILY, Disp-90 capsule,R-3Normal      fluticasone-salmeterol (ADVAIR DISKUS) 250-50 MCG/DOSE AEPB Inhale 1 puff into the lungs every 12 hours, Disp-180 each,R-3Normal      citalopram (CELEXA) 20 MG tablet TAKE 1 TABLET DAILY, Disp-90 tablet,R-3Normal      traZODone (DESYREL) 50 MG tablet 2 to 3 tabs nightly for sleep, Disp-270 tablet,R-3Normal      glimepiride (AMARYL) 4 MG tablet Take 1 tablet by mouth 2 times daily (before meals), Disp-180 tablet,R-3Dose increaseNormal      ondansetron (ZOFRAN) 4 MG tablet Take 1 tablet by mouth every 8 hours as needed for Nausea, Disp-30 tablet,R-0Normal      aspirin EC 81 MG EC tablet Take 1 tablet by mouth daily, Disp-90 tablet, R-3OTC      cetirizine (ZYRTEC) 10 MG tablet Take 10 mg by mouth daily. ALLERGIES     Patient is is allergic to iodine and sulfa antibiotics.     Patients   Immunization History   Administered Date(s) Administered    COVID-19, Pfizer, OLIVERIO, 30mcg/0.3mL 03/19/2021, 04/13/2021    Influenza Virus Vaccine 09/30/2018, 10/21/2020    Influenza, Quadv, IM, (6 mo and older Fluzone, Flulaval, Fluarix and 3 yrs and older Afluria) 10/19/2017    Influenza, Quadv, IM, PF (6 mo and older Fluzone, Flulaval, Fluarix, and 3 yrs and older Afluria) 10/21/2020    Pneumococcal Polysaccharide ([id number]) 10/13/2016    Zoster Recombinant

## 2021-05-30 RX ORDER — KETOROLAC TROMETHAMINE 10 MG/1
10 TABLET, FILM COATED ORAL EVERY 6 HOURS PRN
Qty: 20 TABLET | Refills: 0 | Status: SHIPPED | OUTPATIENT
Start: 2021-05-30 | End: 2021-09-07

## 2021-05-30 NOTE — ED TRIAGE NOTES
Pt presents to the ED via ED lobby with c/o difficulty urinating and hematuria. Pt states she was seen at urgent care and cultures of her urine were obtained. Pt was instructed to be seen if symptoms worsened. Pt reports increased pain and nausea since being seen at urgent care.

## 2021-05-31 LAB
ORGANISM: ABNORMAL
URINE CULTURE, ROUTINE: ABNORMAL

## 2021-06-01 ENCOUNTER — TELEPHONE (OUTPATIENT)
Dept: FAMILY MEDICINE CLINIC | Age: 66
End: 2021-06-01

## 2021-06-01 DIAGNOSIS — F51.01 PRIMARY INSOMNIA: ICD-10-CM

## 2021-06-01 RX ORDER — TRAZODONE HYDROCHLORIDE 50 MG/1
TABLET ORAL
Qty: 270 TABLET | Refills: 0 | Status: SHIPPED | OUTPATIENT
Start: 2021-06-01 | End: 2021-08-23

## 2021-06-01 NOTE — TELEPHONE ENCOUNTER
Patient's  stopped in the office to request a letter for patient. She was in the ED over the weekend and he is requesting an off work letter from today until she comes to see you next week 6/09/21.   Please call him back at [phone]

## 2021-06-01 NOTE — LETTER
[address] [address]  Phone: [phone]  Fax: [phone]    Vicki Rooney MD        June 2, 2021     Patient: Rohit Avila   YOB: 1955           To Whom It May Concern: It is my medical opinion that Tavo Ulloa may return to work on 06/10/21. Off work 06/01/21 through 06/09/21. .    If you have any questions or concerns, please don't hesitate to call.     Sincerely,        Vicki Rooney MD

## 2021-06-01 NOTE — TELEPHONE ENCOUNTER
Zahra called, states Pt did not use her Rx from 10/2020 for trazodone and they need new Rx sent.     Alejandra Graham needs refill of   Requested Prescriptions     Pending Prescriptions Disp Refills    traZODone (DESYREL) 50 MG tablet 270 tablet 3     Si to 3 tabs nightly for sleep       Last Filled on:  10/26/2020      Last Visit Date:  2021    Next Visit Date:    Visit date not found    Please see Katerine's message about RTW letter

## 2021-06-03 ASSESSMENT — ENCOUNTER SYMPTOMS
CHEST TIGHTNESS: 0
WHEEZING: 0
SHORTNESS OF BREATH: 0
DIARRHEA: 0
PHOTOPHOBIA: 0
COLOR CHANGE: 0
NAUSEA: 0
RHINORRHEA: 0
BACK PAIN: 0
SINUS PAIN: 0
SORE THROAT: 0
TROUBLE SWALLOWING: 0
ABDOMINAL PAIN: 1
VOMITING: 0
SINUS PRESSURE: 0
CONSTIPATION: 0
COUGH: 0

## 2021-06-03 NOTE — ED PROVIDER NOTES
[facility] 65 22 COMPLAINT       Chief Complaint   Patient presents with    Hematuria    Dysuria       Nurses Notes reviewed and I agree except as noted in the HPI. HISTORY OF PRESENT ILLNESS    Autry Homans is a 63 y.o. female who presents to the Emergency Department for the evaluation of hematuria and dysuria with associated lower abdominal pain. States that starting this morning she had difficulty emptying her bladder and noticed that she was passing red-colored urine. Went to urgent care for evaluation, urine sample was discolored enough that they were unable to test it with urine dipstick test, sample was sent for culture and she was sent to the emergency department for further evaluation. States that she had one episode of this previously with associated urinary retention however she passed a clot and her symptoms resolved. Complains of lower abdominal pain and distention. The HPI was provided by the patient. REVIEW OF SYSTEMS     Review of Systems   Constitutional: Negative for chills, diaphoresis, fatigue and fever. HENT: Negative for congestion, ear pain, nosebleeds, rhinorrhea, sinus pressure, sinus pain, sore throat and trouble swallowing. Eyes: Negative for photophobia. Respiratory: Negative for cough, chest tightness, shortness of breath and wheezing. Cardiovascular: Negative for chest pain and palpitations. Gastrointestinal: Positive for abdominal pain. Negative for constipation, diarrhea, nausea and vomiting. Endocrine: Negative for cold intolerance and heat intolerance. Genitourinary: Positive for decreased urine volume. Negative for difficulty urinating, dysuria, flank pain, hematuria, pelvic pain, vaginal bleeding, vaginal discharge and vaginal pain. Musculoskeletal: Negative for arthralgias, back pain, joint swelling, myalgias and neck stiffness. Skin: Negative for color change and wound. Neurological: Negative for dizziness, weakness, light-headedness, numbness and headaches. Psychiatric/Behavioral: Negative for agitation, behavioral problems, confusion, hallucinations, self-injury and suicidal ideas. The patient is not nervous/anxious. PAST MEDICAL HISTORY    has a past medical history of Allergic rhinitis, CKD stage 3 due to type 2 diabetes mellitus ([address] Depression, DM type 2 causing CKD stage 3 ([address] Headache(784.0), Hypertension, Multinodular goiter, Persistent proteinuria associated with type 2 diabetes mellitus ([address] and Pure hypercholesterolemia. SURGICAL HISTORY      has a past surgical history that includes Dilation and curettage of uterus (2016).     CURRENT MEDICATIONS       Discharge Medication List as of 5/30/2021  1:36 AM      CONTINUE these medications which have NOT CHANGED    Details   metFORMIN (GLUCOPHAGE) 500 MG tablet Take 1 tablet by mouth 2 times daily (with meals), Disp-180 tablet, R-1Normal      ketorolac (TORADOL) 10 MG tablet Take 1 tablet by mouth every 6 hours as needed for Pain, Disp-20 tablet, R-0Normal      CPAP Machine MISC Starting Wed 11/4/2020, Disp-1 each, R-0, PrintPlease change her current Auto CPAP pressure settings I.e from Minimum CPAP of 6cm H20 and Maximum CPAP of 20cm H20 settings to a fixed CPAP pressure of 11cm H20 with EPR of 2.      albuterol sulfate  (90 Base) MCG/ACT inhaler Inhale 2 puffs into the lungs every 6 hours as needed for Wheezing, Disp-3 Inhaler,R-3Normal      propranolol (INDERAL LA) 160 MG extended release capsule TAKE 1 CAPSULE DAILY, Disp-90 capsule,R-3Normal      fluticasone-salmeterol (ADVAIR DISKUS) 250-50 MCG/DOSE AEPB Inhale 1 puff into the lungs every 12 hours, Disp-180 each,R-3Normal      citalopram (CELEXA) 20 MG tablet TAKE 1 TABLET DAILY, Disp-90 tablet,R-3Normal      glimepiride (AMARYL) 4 MG tablet Take 1 tablet by mouth 2 times daily (before meals), Disp-180 tablet,R-3Dose increaseNormal traZODone (DESYREL) 50 MG tablet 2 to 3 tabs nightly for sleep, Disp-270 tablet,R-3Normal      ondansetron (ZOFRAN) 4 MG tablet Take 1 tablet by mouth every 8 hours as needed for Nausea, Disp-30 tablet,R-0Normal      aspirin EC 81 MG EC tablet Take 1 tablet by mouth daily, Disp-90 tablet, R-3OTC      cetirizine (ZYRTEC) 10 MG tablet Take 10 mg by mouth daily. ALLERGIES     is allergic to iodine and sulfa antibiotics. FAMILY HISTORY     She indicated that her mother is . She indicated that her father is . She indicated that the status of her neg hx is unknown.   family history includes Cancer in her father; Diabetes in her brother, brother, and father; Early Death (age of onset: 62) in her mother; High Blood Pressure in her mother; Kriste Seat / Suezanne Pond in her mother; Stroke in her mother. SOCIAL HISTORY      reports that she quit smoking about 33 years ago. Her smoking use included cigarettes. She has a 7.00 pack-year smoking history. She has never used smokeless tobacco. She reports that she does not drink alcohol and does not use drugs. PHYSICAL EXAM     INITIAL VITALS:  height is 5' 6\" (1.676 m) and weight is 280 lb (127 kg). Her oral temperature is 98.3 °F (36.8 °C). Her blood pressure is 168/68 (abnormal) and her pulse is 70. Her respiration is 16 and oxygen saturation is 95%. Physical Exam  Vitals and nursing note reviewed. Constitutional:       General: She is awake. She is not in acute distress. Appearance: Normal appearance. She is well-developed. She is obese. She is not ill-appearing, toxic-appearing or diaphoretic. HENT:      Head: Normocephalic and atraumatic. Right Ear: Tympanic membrane normal.      Left Ear: Tympanic membrane normal.      Nose: Nose normal.      Mouth/Throat:      Mouth: Mucous membranes are moist.      Pharynx: Oropharynx is clear. Eyes:      Extraocular Movements: Extraocular movements intact.       Pupils: Pupils are equal, round, and reactive to light. Neck:      Vascular: No carotid bruit. Cardiovascular:      Rate and Rhythm: Normal rate and regular rhythm. Pulses: Normal pulses. Heart sounds: Normal heart sounds, S1 normal and S2 normal. Heart sounds not distant. No murmur heard. No friction rub. No gallop. Pulmonary:      Effort: Pulmonary effort is normal. No tachypnea, bradypnea, accessory muscle usage, prolonged expiration, respiratory distress or retractions. Breath sounds: Normal breath sounds. Abdominal:      General: Abdomen is flat. Bowel sounds are normal. There is distension. There is no abdominal bruit. There are no signs of injury. Palpations: Abdomen is soft. There is no shifting dullness, fluid wave, hepatomegaly, splenomegaly, mass or pulsatile mass. Tenderness: There is abdominal tenderness in the periumbilical area and suprapubic area. There is no guarding or rebound. Hernia: No hernia is present. Musculoskeletal:         General: No swelling, tenderness, deformity or signs of injury. Normal range of motion. Cervical back: Normal range of motion and neck supple. No rigidity. No muscular tenderness. Right lower leg: No edema. Left lower leg: No edema. Lymphadenopathy:      Cervical: No cervical adenopathy. Skin:     General: Skin is warm and dry. Capillary Refill: Capillary refill takes less than 2 seconds. Neurological:      General: No focal deficit present. Mental Status: She is alert and oriented to person, place, and time. Mental status is at baseline. GCS: GCS eye subscore is 4. GCS verbal subscore is 5. GCS motor subscore is 6. Cranial Nerves: Cranial nerves are intact. Psychiatric:         Attention and Perception: Attention normal.         Mood and Affect: Mood normal.         Speech: Speech normal.         Behavior: Behavior normal. Behavior is cooperative. Thought Content:  Thought content normal. Cognition and Memory: Cognition and memory normal.         Judgment: Judgment normal.          DIFFERENTIAL DIAGNOSIS:   Kidney stone, UTI, pyelonephritis, renal mass    DIAGNOSTIC RESULTS     EKG: All EKG's are interpreted by the Emergency Department Physician who either signs or Co-signs this chart in the absence of a cardiologist.    None    RADIOLOGY: non-plainfilm images(s) such as CT, Ultrasound and MRI are read by the radiologist.    CT ABDOMEN PELVIS WO CONTRAST Additional Contrast? None   Final Result   Large, complex partially exophytic bilateral renal masses concerning for    neoplasm, suboptimally evaluated without contrast.   No hydronephrosis. Mild retroperitoneal adenopathy. Hepatic cysts. This document has been electronically signed by: Srinivas Tran MD on    05/30/2021 12:53 AM      All CTs at this facility use dose modulation techniques and iterative    reconstructions, and/or weight-based dosing   when appropriate to reduce radiation to a low as reasonably achievable.           LABS:     Labs Reviewed   CBC WITH AUTO DIFFERENTIAL - Abnormal; Notable for the following components:       Result Value    WBC 11.8 (*)     Hemoglobin 11.6 (*)     Hematocrit 36.0 (*)     MPV 9.3 (*)     Segs Absolute 9.1 (*)     All other components within normal limits   BASIC METABOLIC PANEL W/ REFLEX TO MG FOR LOW K - Abnormal; Notable for the following components:    Sodium 134 (*)     Glucose 227 (*)     BUN 24 (*)     CREATININE 1.3 (*)     All other components within normal limits   HEPATIC FUNCTION PANEL - Abnormal; Notable for the following components:    ALT 10 (*)     All other components within normal limits   URINE WITH REFLEXED MICRO - Abnormal; Notable for the following components:    Blood, Urine LARGE (*)     Protein,  (*)     Leukocyte Esterase, Urine TRACE (*)     Character, Urine CLOUDY (*)     All other components within normal limits   GLOMERULAR FILTRATION RATE, ESTIMATED - Abnormal; Notable for the following components:    Est, Glom Filt Rate 41 (*)     All other components within normal limits   LIPASE   LACTATE DEHYDROGENASE   ANION GAP   OSMOLALITY       EMERGENCY DEPARTMENT COURSE:   Vitals:    Vitals:    05/29/21 2052 05/29/21 2053 05/29/21 2256   BP:  (!) 170/81 (!) 168/68   Pulse: 65  70   Resp: 16  16   Temp: 98.3 °F (36.8 °C)     TempSrc: Oral     SpO2: 96%  95%   Weight: 280 lb (127 kg)     Height: 5' 6\" (1.676 m)           MDM:  Patient seen evaluated in timely manner today for hematuria, urinary retention. Andrea catheter was placed with immediate return of dark red urine and immediate relief of pain. Urine was sent for UA and culture, appropriate labs were ordered, CT of the abdomen and pelvis completed showing bilateral renal masses concerning for malignancy. Patient reported improvement in pain with bladder drainage and pain medications, emphasized with patient importance of follow-up with urology early this coming week for further evaluation of renal masses. Patient and  voiced understanding, given the opportunity to ask questions all questions were answered, encourage patient to return to the emergency department immediately for worsening hematuria or severe pain. CRITICAL CARE:   None    CONSULTS:  None    PROCEDURES:  None    FINAL IMPRESSION      1. Hematuria, gross    2. Bilateral renal masses          DISPOSITION/PLAN   Discharge    PATIENT REFERRED TO:  [address]  [address] [facility].  [address] [facility]  Schedule an appointment as soon as possible for a visit on 6/1/2021        DISCHARGE MEDICATIONS:  Discharge Medication List as of 5/30/2021  1:36 AM          (Please note that portions of this note were completed with a voice recognition program.  Efforts were made to edit the dictations but occasionally words are mis-transcribed.)    The patient was given an opportunity to see the Emergency Attending.  The patient voiced

## 2021-06-08 ENCOUNTER — OFFICE VISIT (OUTPATIENT)
Dept: UROLOGY | Age: 66
End: 2021-06-08
Payer: COMMERCIAL

## 2021-06-08 VITALS
SYSTOLIC BLOOD PRESSURE: 132 MMHG | DIASTOLIC BLOOD PRESSURE: 82 MMHG | WEIGHT: 273 LBS | BODY MASS INDEX: 43.87 KG/M2 | HEIGHT: 66 IN

## 2021-06-08 DIAGNOSIS — N28.89 RENAL MASS: ICD-10-CM

## 2021-06-08 DIAGNOSIS — R31.9 HEMATURIA, UNSPECIFIED TYPE: Primary | ICD-10-CM

## 2021-06-08 DIAGNOSIS — R33.9 URINARY RETENTION: ICD-10-CM

## 2021-06-08 LAB
BILIRUBIN URINE: ABNORMAL
BLOOD URINE, POC: ABNORMAL
CHARACTER, URINE: CLEAR
COLOR, URINE: YELLOW
GLUCOSE URINE: NEGATIVE MG/DL
KETONES, URINE: ABNORMAL
LEUKOCYTE CLUMPS, URINE: ABNORMAL
NITRITE, URINE: NEGATIVE
PH, URINE: 5.5 (ref 5–9)
PROTEIN, URINE: ABNORMAL MG/DL
SPECIFIC GRAVITY, URINE: 1.02 (ref 1–1.03)
UROBILINOGEN, URINE: 0.2 EU/DL (ref 0–1)

## 2021-06-08 PROCEDURE — 81003 URINALYSIS AUTO W/O SCOPE: CPT | Performed by: UROLOGY

## 2021-06-08 PROCEDURE — 99205 OFFICE O/P NEW HI 60 MIN: CPT | Performed by: UROLOGY

## 2021-06-08 NOTE — PROGRESS NOTES
[address] [facility]  [address]  [facility] [address]  Dept: [phone]  Dept Fax: [phone] : [phone]    [facility] [address] [facility] Office Note -     Patient:  Valery Connolly  YOB: 1955  Date: 6/8/2021    The patient is a 63 y.o. female who presents today for evaluation of the following problems:   Chief Complaint   Patient presents with    Advice Only     new pt ER f/u hematuria/retention/renal mass    referred/consultation requested by Angelo Nichols MD.    HISTORY OF PRESENT ILLNESS:     Bilateral renal masses  Large-- concerning for malignancy  Discussed treatment options at length  Pt is hesitant about nephrectomy/partial/staged procedures or any surgery in general because of risk of dialysis  Discussed that non surgical options are not curative options at this stage    Hematuria  New problem    Urinary retention  New problem  Was seen in ED        Requested/reviewed records from Angelo Nichols MD office and/or outside physician/EMR    (Patient's old records have been requested, reviewed and pertinent findings summarized in today's note.)    Procedures Today: N/A    Last several PSA's:  No results found for: PSA    Last total testosterone:  No results found for: TESTOSTERONE    Urinalysis today:  Results for POC orders placed in visit on 06/08/21   POCT Urinalysis No Micro (Auto)   Result Value Ref Range    Glucose, Ur Negative NEGATIVE mg/dl    Bilirubin Urine Small (A)     Ketones, Urine Trace (A) NEGATIVE    Specific Gravity, Urine 1.020 1.002 - 1.030    Blood, UA POC Small (A) NEGATIVE    pH, Urine 5.50 5.0 - 9.0    Protein, Urine Trace (A) NEGATIVE mg/dl    Urobilinogen, Urine 0.20 0.0 - 1.0 eu/dl    Nitrite, Urine Negative NEGATIVE    Leukocyte Clumps, Urine Small (A) NEGATIVE    Color, Urine Yellow [id number]-STRAW    Character, Urine Clear [id number]       Last BUN and creatinine:  Lab Results   Component Value Date    BUN 24 (H) 05/29/2021     Lab Results   Component Value Date    CREATININE 1.3 (H) 05/29/2021       Imaging Reviewed during this Office Visit:   Yakov Dominguez MD independently reviewed the images and verified the radiology reports from:    CT ABDOMEN PELVIS WO CONTRAST Additional Contrast? None    Result Date: 5/30/2021  Exam: CT abdomen and pelvis without IV contrast. Comparison: None Findings: No free air. No solid liver mass. Several cysts, largest 3.8 x 4.9 cm No calcified gallstones. No clinically significant biliary ductal dilation. No splenomegaly or suspicious splenic lesions. No solid or cystic pancreatic mass. No pancreatic ductal dilation. No acute inflammatory changes. Adrenal glands without acute pathology. Large bilateral complex renal masses concerning for neoplasm. These are suboptimally assessed without IV contrast.  Mass on the right kidney is exophytic laterally grossly measuring 13.1 x 12.7 x 11.7 cm. Mass on the left involves the interpolar and lower pole levels, measuring 10.3 x 10.7 x 9.5 cm. There is distortion of the renal parenchyma bilaterally and there are prominent adjacent nodes. No hydronephrosis detected. Bladder decompressed by Andrea. No bowel obstruction. No evidence for acute appendicitis. No adenopathy. No abdominal aortic aneurysm. No acute soft tissue pathology. No acute osseous pathology. Degenerative changes. Large, complex partially exophytic bilateral renal masses concerning for neoplasm, suboptimally evaluated without contrast. No hydronephrosis. Mild retroperitoneal adenopathy. Hepatic cysts. This document has been electronically signed by: Lalo Black MD on 05/30/2021 12:53 AM All CTs at this facility use dose modulation techniques and iterative reconstructions, and/or weight-based dosing when appropriate to reduce radiation to a low as reasonably achievable.       PAST MEDICAL, FAMILY AND SOCIAL HISTORY:  Past Medical History:   Diagnosis Date    Allergic rhinitis     CKD stage 3 due to type 2 diabetes mellitus ([facility] [address]     Depression     DM type 2 causing CKD stage 3 ([facility] [address]     onset 2013.  Headache(784.0)     Hypertension     Multinodular goiter     Persistent proteinuria associated with type 2 diabetes mellitus ([facility] [address] 10/2016    urine micral of 20.       Pure hypercholesterolemia      Past Surgical History:   Procedure Laterality Date    DILATION AND CURETTAGE OF UTERUS  2016    x2     Family History   Problem Relation Age of Onset    Early Death Mother 62    Stroke Mother     High Blood Pressure Mother    [de-identified] / Stillbirths Mother     Cancer Father     Diabetes Father     Diabetes Brother     Diabetes Brother     Arthritis Neg Hx     Asthma Neg Hx     Birth Defects Neg Hx     Depression Neg Hx     Hearing Loss Neg Hx     Heart Disease Neg Hx     High Cholesterol Neg Hx     Kidney Disease Neg Hx     Learning Disabilities Neg Hx     Mental Illness Neg Hx     Mental Retardation Neg Hx     Substance Abuse Neg Hx     Vision Loss Neg Hx     Other Neg Hx      Outpatient Medications Marked as Taking for the 6/8/21 encounter (Office Visit) with Piedad Vogt MD   Medication Sig Dispense Refill    traZODone (DESYREL) 50 MG tablet 2 to 3 tabs nightly for sleep 270 tablet 0    ketorolac (TORADOL) 10 MG tablet Take 1 tablet by mouth every 6 hours as needed for Pain 20 tablet 0    metFORMIN (GLUCOPHAGE) 500 MG tablet Take 1 tablet by mouth 2 times daily (with meals) 180 tablet 1    CPAP Machine MISC by Does not apply route Please change her current Auto CPAP pressure settings I.e from Minimum CPAP of 6cm H20 and Maximum CPAP of 20cm H20 settings to a fixed CPAP pressure of 11cm H20 with EPR of 2. 1 each 0    albuterol sulfate  (90 Base) MCG/ACT inhaler Inhale 2 puffs into the lungs every 6 hours as needed for Wheezing 3 Inhaler 3    propranolol (INDERAL LA) 160 MG extended release capsule TAKE 1 CAPSULE DAILY 90 capsule 3    fluticasone-salmeterol (ADVAIR DISKUS) 250-50 MCG/DOSE AEPB Inhale 1 puff into the lungs every 12 hours 180 each 3    citalopram (CELEXA) 20 MG tablet TAKE 1 TABLET DAILY 90 tablet 3    glimepiride (AMARYL) 4 MG tablet Take 1 tablet by mouth 2 times daily (before meals) 180 tablet 3    ondansetron (ZOFRAN) 4 MG tablet Take 1 tablet by mouth every 8 hours as needed for Nausea 30 tablet 0    aspirin EC 81 MG EC tablet Take 1 tablet by mouth daily 90 tablet 3    cetirizine (ZYRTEC) 10 MG tablet Take 10 mg by mouth daily. Iodine and Sulfa antibiotics  Social History     Tobacco Use   Smoking Status Former Smoker    Packs/day: 0.50    Years: 14.00    Pack years: 7.00    Types: Cigarettes    Quit date: 10/18/1987    Years since quittin.6   Smokeless Tobacco Never Used      (If patient a smoker, smoking cessation counseling offered)   Social History     Substance and Sexual Activity   Alcohol Use No       REVIEW OF SYSTEMS:  Constitutional: negative  Eyes: negative  Respiratory: negative  Cardiovascular: negative  Gastrointestinal: negative  Genitourinary: see HPI  Musculoskeletal: negative  Skin: negative   Neurological: negative  Hematological/Lymphatic: negative  Psychological: negative        Physical Exam:    This a 63 y.o. female  Vitals:    21 1319   BP: 132/82     Body mass index is 44.06 kg/m². Constitutional: Patient in no acute distress;       Assessment and Plan        1. Hematuria, unspecified type    2. Urinary retention    3. Renal mass               Plan:        Retention for one week with hematuria-- no catheter at this time  Renal masses--sees [facility]. Needs surgery. Discussed at length with patient. Follow up after surgery           Prescriptions Ordered:  No orders of the defined types were placed in this encounter.      Orders Placed:  Orders Placed This Encounter   Procedures    POCT Urinalysis No Micro (Auto)            MALATHI GRIDER MD

## 2021-06-09 ENCOUNTER — TELEPHONE (OUTPATIENT)
Dept: FAMILY MEDICINE CLINIC | Age: 66
End: 2021-06-09

## 2021-06-09 DIAGNOSIS — F41.9 ANXIETY: Primary | ICD-10-CM

## 2021-06-09 RX ORDER — DIAZEPAM 5 MG/1
TABLET ORAL
Qty: 2 TABLET | Refills: 0 | Status: SHIPPED | OUTPATIENT
Start: 2021-06-09 | End: 2021-06-10

## 2021-06-09 NOTE — TELEPHONE ENCOUNTER
ep'ed valium for the MRI. Don't know where the MRI is set up. [facility] does have an open MRI. Will need a  with the valium usage. Controlled Substance Monitoring:    Acute and Chronic Pain Monitoring:   RX Monitoring 6/9/2021   Attestation -   Periodic Controlled Substance Monitoring No signs of potential drug abuse or diversion identified.

## 2021-06-11 ENCOUNTER — HOSPITAL ENCOUNTER (OUTPATIENT)
Dept: CT IMAGING | Age: 66
Discharge: HOME | End: 2021-06-11
Payer: COMMERCIAL

## 2021-06-11 DIAGNOSIS — C64.9 MALIGNANT NEOPLASM OF KIDNEY, UNSPECIFIED LATERALITY (HCC): ICD-10-CM

## 2021-06-11 PROCEDURE — 71250 CT THORAX DX C-: CPT

## 2021-06-19 ENCOUNTER — HOSPITAL ENCOUNTER (OUTPATIENT)
Dept: MRI IMAGING | Age: 66
Discharge: HOME | End: 2021-06-19
Payer: COMMERCIAL

## 2021-06-19 DIAGNOSIS — N28.89 OTHER SPECIFIED DISORDERS OF KIDNEY AND URETER: ICD-10-CM

## 2021-06-19 PROCEDURE — 6360000004 HC RX CONTRAST MEDICATION: Performed by: UROLOGY

## 2021-06-19 PROCEDURE — A9579 GAD-BASE MR CONTRAST NOS,1ML: HCPCS | Performed by: UROLOGY

## 2021-06-19 PROCEDURE — 74183 MRI ABD W/O CNTR FLWD CNTR: CPT

## 2021-06-19 RX ADMIN — GADOTERIDOL 20 ML: 279.3 INJECTION, SOLUTION INTRAVENOUS at 11:33

## 2021-06-22 ENCOUNTER — TELEPHONE (OUTPATIENT)
Dept: FAMILY MEDICINE CLINIC | Age: 66
End: 2021-06-22

## 2021-06-22 NOTE — TELEPHONE ENCOUNTER
Pt left VM today at 2:09 pm, states she has kidney cancer, having surgery in July at [facility] and would like a referral to oncology. Dr Sudhir Rivas has not seen pt for this, she will need an appointment.     Pt notified, she is agreeable to appointment, scheduled tomorrow 06/23/2021 @ 9:45am

## 2021-06-23 ENCOUNTER — OFFICE VISIT (OUTPATIENT)
Dept: FAMILY MEDICINE CLINIC | Age: 66
End: 2021-06-23
Payer: COMMERCIAL

## 2021-06-23 ENCOUNTER — TELEPHONE (OUTPATIENT)
Dept: FAMILY MEDICINE CLINIC | Age: 66
End: 2021-06-23

## 2021-06-23 VITALS
OXYGEN SATURATION: 97 % | RESPIRATION RATE: 18 BRPM | DIASTOLIC BLOOD PRESSURE: 78 MMHG | HEART RATE: 65 BPM | BODY MASS INDEX: 43.35 KG/M2 | WEIGHT: 268.6 LBS | SYSTOLIC BLOOD PRESSURE: 120 MMHG

## 2021-06-23 DIAGNOSIS — R93.7 ABNORMAL MRI, THORACIC SPINE: ICD-10-CM

## 2021-06-23 DIAGNOSIS — N28.89 BILATERAL RENAL MASSES: Primary | ICD-10-CM

## 2021-06-23 DIAGNOSIS — N18.30 TYPE 2 DIABETES MELLITUS WITH STAGE 3 CHRONIC KIDNEY DISEASE, WITHOUT LONG-TERM CURRENT USE OF INSULIN, UNSPECIFIED WHETHER STAGE 3A OR 3B CKD (HCC): ICD-10-CM

## 2021-06-23 DIAGNOSIS — E11.22 TYPE 2 DIABETES MELLITUS WITH STAGE 3 CHRONIC KIDNEY DISEASE, WITHOUT LONG-TERM CURRENT USE OF INSULIN, UNSPECIFIED WHETHER STAGE 3A OR 3B CKD (HCC): ICD-10-CM

## 2021-06-23 PROCEDURE — 3052F HG A1C>EQUAL 8.0%<EQUAL 9.0%: CPT | Performed by: FAMILY MEDICINE

## 2021-06-23 PROCEDURE — 99214 OFFICE O/P EST MOD 30 MIN: CPT | Performed by: FAMILY MEDICINE

## 2021-06-23 ASSESSMENT — ENCOUNTER SYMPTOMS
DIARRHEA: 0
RHINORRHEA: 0
WHEEZING: 0
SORE THROAT: 0
BACK PAIN: 0
COUGH: 0
EYE PAIN: 0
NAUSEA: 0
ABDOMINAL PAIN: 0
CONSTIPATION: 0
SHORTNESS OF BREATH: 0
BLOOD IN STOOL: 0
CHEST TIGHTNESS: 0
VOMITING: 0

## 2021-06-23 NOTE — PROGRESS NOTES
Jasmin Lizama (:  1955) is a 63 y.o. female,Established patient, here for evaluation of the following chief complaint(s):  Check-Up (referral to oncology)         ASSESSMENT/PLAN:  1. Bilateral renal masses  -     [facility] - Angela Jin MD, Hematology & Oncology, [facility] [place]  -new finding, probable cancer, unknown diagnosis/prognosis,  possible dialysis if needs bilateral radical nephrectomies, following with [facility]  2. Abnormal MRI, thoracic spine  -     Martha Stevenson MD, Hematology & Oncology, [facility] [place]  -possible metastatic lesion on the spine, refer to oncology for further evaluation. 3. Type 2 diabetes mellitus with stage 3 chronic kidney disease, without long-term current use of insulin, unspecified whether stage 3a or 3b CKD ([facility] [address]   -stable on amaryl and metformin,   Lab Results   Component Value Date    LABA1C 8.3 (H) 2021     No results found for: EAG      No follow-ups on file. Subjective   SUBJECTIVE/OBJECTIVE:  HPI  Pt here for a check up. Reviewed BMI of 43. Encouraged diet, exercise and weight loss. New diagnoses of bilateral large renal masses. Likely cancer. Surgery scheduled for 2021 at [address] [place] [address]. Dr. Migue Villalobos. Interested in oncology referral.  Reviewed CT/MRI. ? T12 lesion. , former smoker, pmh reviewed. Review of Systems   Constitutional: Negative for chills, fatigue, fever and unexpected weight change. HENT: Negative for congestion, ear pain, rhinorrhea and sore throat. Eyes: Negative for pain and visual disturbance. Respiratory: Negative for cough, chest tightness, shortness of breath and wheezing. Cardiovascular: Negative for chest pain and palpitations. Gastrointestinal: Negative for abdominal pain, blood in stool, constipation, diarrhea, nausea and vomiting. Genitourinary: Negative for difficulty urinating, frequency, hematuria and urgency. Musculoskeletal: Negative for back pain, joint swelling, myalgias and neck pain.    Skin: Negative for rash. Neurological: Negative for dizziness and headaches. Hematological: Negative for adenopathy. Does not bruise/bleed easily. Psychiatric/Behavioral: Negative for behavioral problems and sleep disturbance. The patient is not nervous/anxious. Objective   Physical Exam  Vitals and nursing note reviewed. Constitutional:       Appearance: She is well-developed. HENT:      Head: Normocephalic and atraumatic. Right Ear: External ear normal.      Left Ear: External ear normal.      Nose: Nose normal.      Mouth/Throat:      Mouth: Mucous membranes are moist.   Eyes:      Pupils: Pupils are equal, round, and reactive to light. Neck:      Thyroid: No thyromegaly. Cardiovascular:      Rate and Rhythm: Normal rate and regular rhythm. Heart sounds: Normal heart sounds. Pulmonary:      Breath sounds: Normal breath sounds. No wheezing or rales. Abdominal:      General: Bowel sounds are normal.      Palpations: Abdomen is soft. Tenderness: There is no abdominal tenderness. There is no guarding or rebound. Musculoskeletal:         General: Normal range of motion. Cervical back: Neck supple. Lymphadenopathy:      Cervical: No cervical adenopathy. Skin:     General: Skin is warm and dry. Findings: No rash. Neurological:      Mental Status: She is alert and oriented to person, place, and time. Cranial Nerves: No cranial nerve deficit. Deep Tendon Reflexes: Reflexes are normal and symmetric. An electronic signature was used to authenticate this note.     --Zeina Chau MD

## 2021-06-23 NOTE — TELEPHONE ENCOUNTER
Pt was seen in office today, she would like to discuss treatment options before surgery scheduled 07/02/2021 @ [facility]. [facility] and the [facility] cannot get Pt in until 07/06/2021. I called Dr Luz Marina Medina office, gave them Pt situation. He does not have an opening but he is willing to review Pt information to see if he can fit her in before 06/29/2021. Records faxed to [phone]      Pt notified, discussed [facility] and Pt would like me to call and see if they could get her in. I called [facility] oncology, soonest she could be seen is 07/08/2021. Pt states she called Dr Ry Tracey office and they are able to get her in if you think, she would like to know your opinion.

## 2021-07-01 ENCOUNTER — TELEPHONE (OUTPATIENT)
Dept: FAMILY MEDICINE CLINIC | Age: 66
End: 2021-07-01

## 2021-07-01 NOTE — TELEPHONE ENCOUNTER
Dr Germán Mayorga office called, states they have made 3 attempts to contact Pt. I spoke with Pt, she states she has seen Dr Alli Alcantar and had made 2 attempts to contact Dr Germán Mayorga office to cancel referral.    Helen Bosch at Dr Germán Mayorga office notified.

## 2021-08-02 ENCOUNTER — HOSPITAL ENCOUNTER (EMERGENCY)
Age: 66
Discharge: HOME | End: 2021-08-02
Attending: FAMILY MEDICINE
Payer: COMMERCIAL

## 2021-08-02 VITALS
BODY MASS INDEX: 39.86 KG/M2 | WEIGHT: 248 LBS | HEART RATE: 62 BPM | OXYGEN SATURATION: 97 % | RESPIRATION RATE: 16 BRPM | HEIGHT: 66 IN | TEMPERATURE: 98.4 F | DIASTOLIC BLOOD PRESSURE: 92 MMHG | SYSTOLIC BLOOD PRESSURE: 199 MMHG

## 2021-08-02 DIAGNOSIS — R51.9 NONINTRACTABLE HEADACHE, UNSPECIFIED CHRONICITY PATTERN, UNSPECIFIED HEADACHE TYPE: Primary | ICD-10-CM

## 2021-08-02 LAB
ANION GAP SERPL CALCULATED.3IONS-SCNC: 15 MEQ/L (ref 8–16)
BASOPHILS # BLD: 0.4 %
BASOPHILS ABSOLUTE: 0 THOU/MM3 (ref 0–0.1)
BUN BLDV-MCNC: 16 MG/DL (ref 7–22)
CALCIUM SERPL-MCNC: 9.8 MG/DL (ref 8.5–10.5)
CHLORIDE BLD-SCNC: 100 MEQ/L (ref 98–111)
CO2: 22 MEQ/L (ref 23–33)
CREAT SERPL-MCNC: 1.1 MG/DL (ref 0.4–1.2)
EOSINOPHIL # BLD: 0.7 %
EOSINOPHILS ABSOLUTE: 0.1 THOU/MM3 (ref 0–0.4)
ERYTHROCYTE [DISTWIDTH] IN BLOOD BY AUTOMATED COUNT: 14.1 % (ref 11.5–14.5)
ERYTHROCYTE [DISTWIDTH] IN BLOOD BY AUTOMATED COUNT: 39 FL (ref 35–45)
GFR SERPL CREATININE-BSD FRML MDRD: 50 ML/MIN/1.73M2
GLUCOSE BLD-MCNC: 113 MG/DL (ref 70–108)
HCT VFR BLD CALC: 41.1 % (ref 37–47)
HEMOGLOBIN: 13.8 GM/DL (ref 12–16)
IMMATURE GRANS (ABS): 0.05 THOU/MM3 (ref 0–0.07)
IMMATURE GRANULOCYTES: 0.4 %
LYMPHOCYTES # BLD: 18.7 %
LYMPHOCYTES ABSOLUTE: 2.1 THOU/MM3 (ref 1–4.8)
MCH RBC QN AUTO: 26.4 PG (ref 26–33)
MCHC RBC AUTO-ENTMCNC: 33.6 GM/DL (ref 32.2–35.5)
MCV RBC AUTO: 78.7 FL (ref 81–99)
MONOCYTES # BLD: 9.1 %
MONOCYTES ABSOLUTE: 1 THOU/MM3 (ref 0.4–1.3)
NUCLEATED RED BLOOD CELLS: 0 /100 WBC
OSMOLALITY CALCULATION: 275.8 MOSMOL/KG (ref 275–300)
PLATELET # BLD: 286 THOU/MM3 (ref 130–400)
PMV BLD AUTO: 8.8 FL (ref 9.4–12.4)
POTASSIUM SERPL-SCNC: 4.4 MEQ/L (ref 3.5–5.2)
RBC # BLD: 5.22 MILL/MM3 (ref 4.2–5.4)
SEG NEUTROPHILS: 70.7 %
SEGMENTED NEUTROPHILS ABSOLUTE COUNT: 8 THOU/MM3 (ref 1.8–7.7)
SODIUM BLD-SCNC: 137 MEQ/L (ref 135–145)
WBC # BLD: 11.3 THOU/MM3 (ref 4.8–10.8)

## 2021-08-02 PROCEDURE — 99285 EMERGENCY DEPT VISIT HI MDM: CPT

## 2021-08-02 PROCEDURE — 93005 ELECTROCARDIOGRAM TRACING: CPT | Performed by: FAMILY MEDICINE

## 2021-08-02 PROCEDURE — 80048 BASIC METABOLIC PNL TOTAL CA: CPT

## 2021-08-02 PROCEDURE — 6370000000 HC RX 637 (ALT 250 FOR IP): Performed by: STUDENT IN AN ORGANIZED HEALTH CARE EDUCATION/TRAINING PROGRAM

## 2021-08-02 PROCEDURE — 96375 TX/PRO/DX INJ NEW DRUG ADDON: CPT

## 2021-08-02 PROCEDURE — 85025 COMPLETE CBC W/AUTO DIFF WBC: CPT

## 2021-08-02 PROCEDURE — 2580000003 HC RX 258: Performed by: STUDENT IN AN ORGANIZED HEALTH CARE EDUCATION/TRAINING PROGRAM

## 2021-08-02 PROCEDURE — 6360000002 HC RX W HCPCS: Performed by: STUDENT IN AN ORGANIZED HEALTH CARE EDUCATION/TRAINING PROGRAM

## 2021-08-02 PROCEDURE — 36415 COLL VENOUS BLD VENIPUNCTURE: CPT

## 2021-08-02 PROCEDURE — 96374 THER/PROPH/DIAG INJ IV PUSH: CPT

## 2021-08-02 RX ORDER — ACETAMINOPHEN 500 MG
1000 TABLET ORAL ONCE
Status: COMPLETED | OUTPATIENT
Start: 2021-08-02 | End: 2021-08-02

## 2021-08-02 RX ORDER — AXITINIB 5 MG/1
TABLET, FILM COATED ORAL
COMMUNITY
End: 2022-01-01

## 2021-08-02 RX ORDER — DIPHENHYDRAMINE HYDROCHLORIDE 50 MG/ML
12.5 INJECTION INTRAMUSCULAR; INTRAVENOUS ONCE
Status: COMPLETED | OUTPATIENT
Start: 2021-08-02 | End: 2021-08-02

## 2021-08-02 RX ORDER — PROCHLORPERAZINE EDISYLATE 5 MG/ML
10 INJECTION INTRAMUSCULAR; INTRAVENOUS ONCE
Status: COMPLETED | OUTPATIENT
Start: 2021-08-02 | End: 2021-08-02

## 2021-08-02 RX ORDER — 0.9 % SODIUM CHLORIDE 0.9 %
1000 INTRAVENOUS SOLUTION INTRAVENOUS ONCE
Status: COMPLETED | OUTPATIENT
Start: 2021-08-02 | End: 2021-08-02

## 2021-08-02 RX ADMIN — PROCHLORPERAZINE EDISYLATE 10 MG: 5 INJECTION INTRAMUSCULAR; INTRAVENOUS at 13:21

## 2021-08-02 RX ADMIN — ACETAMINOPHEN 1000 MG: 500 TABLET ORAL at 13:20

## 2021-08-02 RX ADMIN — DIPHENHYDRAMINE HYDROCHLORIDE 12.5 MG: 50 INJECTION, SOLUTION INTRAMUSCULAR; INTRAVENOUS at 13:21

## 2021-08-02 RX ADMIN — SODIUM CHLORIDE 1000 ML: 9 INJECTION, SOLUTION INTRAVENOUS at 13:22

## 2021-08-02 ASSESSMENT — ENCOUNTER SYMPTOMS
VOMITING: 0
SHORTNESS OF BREATH: 0
SORE THROAT: 0
EYE PAIN: 0
RECTAL PAIN: 0
CHOKING: 0
FACIAL SWELLING: 0
EYE DISCHARGE: 0
SINUS PRESSURE: 0
TROUBLE SWALLOWING: 0
ANAL BLEEDING: 0
COLOR CHANGE: 0
ABDOMINAL PAIN: 0
BACK PAIN: 0
ABDOMINAL DISTENTION: 0
COUGH: 0
EYE REDNESS: 0
APNEA: 0
NAUSEA: 1
DIARRHEA: 0
CHEST TIGHTNESS: 0
EYE ITCHING: 0
SINUS PAIN: 0

## 2021-08-02 ASSESSMENT — PAIN DESCRIPTION - FREQUENCY: FREQUENCY: CONTINUOUS

## 2021-08-02 ASSESSMENT — PAIN SCALES - GENERAL
PAINLEVEL_OUTOF10: 9
PAINLEVEL_OUTOF10: 3
PAINLEVEL_OUTOF10: 9

## 2021-08-02 ASSESSMENT — PAIN DESCRIPTION - LOCATION: LOCATION: HEAD

## 2021-08-02 ASSESSMENT — PAIN DESCRIPTION - PAIN TYPE: TYPE: ACUTE PAIN

## 2021-08-02 NOTE — ED PROVIDER NOTES
[facility] ENCOUNTER          Pt Name: Dominick Stern  MRN: [id number]  [place] 1955  Date of evaluation: 8/2/2021  Treating Resident Physician: Alexandria Blackwell MD  Supervising Physician: Brennan Storm MD    [address] [place] [address]       Chief Complaint   Patient presents with    Headache     History obtained from the patient. HISTORY OF PRESENT ILLNESS    HPI  Dominick Stern is a 63 y.o. female who presents to the emergency department for evaluation of headache and brain fog. Patient states that the headache started about a week ago on Monday. And she has been feeling lethargic, forgetful, not like herself. She says that she has taken hydrocodone and Toradol for the pain for the past 6 days but there has been no change in her headache. She describes the headache as constant pulsating and pounding and 9 out of 10 in pain. Patient has a history of migraines but states that they are unilateral.  Headache currently is bilateral band like distribution. Patient's last brain scan was an MRI brain with and without contrast on 6/25/2021 which showed no metastasis. Patient has a past medical history of renal cell carcinoma, SANG, type 2 diabetes, CKD. Patient started a new medication axitinib 2 weeks ago in which she thinks attributed to her headache. Patient stopped taking it this morning. Patient also states that she has had a recent change in appetite. She has not eaten or drinking much. Patient admits to feeling slightly nauseous but there has been no episodes of vomitus. She also complains of lower back pain which she thinks is attributed to her kidney disease. The patient has no other acute complaints at this time. REVIEW OF SYSTEMS   Review of Systems   Constitutional: Positive for appetite change and fatigue. Negative for activity change and diaphoresis.    HENT: Negative for ear pain, facial swelling, sinus pressure, sinus pain, sore throat and trouble swallowing. Eyes: Negative for pain, discharge, redness and itching. Respiratory: Negative for apnea, cough, choking, chest tightness and shortness of breath. Cardiovascular: Negative for chest pain, palpitations and leg swelling. Gastrointestinal: Positive for nausea. Negative for abdominal distention, abdominal pain, anal bleeding, diarrhea, rectal pain and vomiting. Endocrine: Negative for polydipsia, polyphagia and polyuria. Genitourinary: Positive for flank pain. Negative for dysuria, genital sores and hematuria. Musculoskeletal: Negative for arthralgias, back pain, joint swelling, myalgias, neck pain and neck stiffness. Skin: Negative for color change, rash and wound. Neurological: Positive for headaches. Negative for syncope, facial asymmetry and speech difficulty. Hematological: Negative for adenopathy. Psychiatric/Behavioral: Positive for confusion and decreased concentration. Negative for agitation, behavioral problems, hallucinations, self-injury and suicidal ideas. PAST MEDICAL AND SURGICAL HISTORY     Past Medical History:   Diagnosis Date    Allergic rhinitis     CKD stage 3 due [facility] type [facility] mellitus [facility]     Depression     DM type 2 causing CKD stage 3 ([facility]     onset 2013.  Headache(784.0)     Hypertension     Multinodular goiter     Persistent proteinuria associated with type 2 diabetes mellitus ([facility] 10/2016    urine micral of 20.  Pure hypercholesterolemia      Past Surgical History:   Procedure Laterality Date    DILATION AND CURETTAGE OF UTERUS  2016    x2       MEDICATIONS   No current facility-administered medications for this encounter.     Current Outpatient Medications:     axitinib (INLYTA) 5 MG tablet, Take by mouth, Disp: , Rfl:     traZODone (DESYREL) 50 MG tablet, 2 to 3 tabs nightly for sleep, Disp: 270 tablet, Rfl: 0    ketorolac (TORADOL) 10 MG tablet, Take 1 tablet by mouth every 6 hours as needed for Pain, Disp: 20 tablet, Rfl: 0    metFORMIN (GLUCOPHAGE) 500 MG tablet, Take 1 tablet by mouth 2 times daily (with meals), Disp: 180 tablet, Rfl: 1    CPAP Machine MISC, by Does not apply route Please change her current Auto CPAP pressure settings I.e from Minimum CPAP of 6cm H20 and Maximum CPAP of 20cm H20 settings to a fixed CPAP pressure of 11cm H20 with EPR of 2., Disp: 1 each, Rfl: 0    albuterol sulfate  (90 Base) MCG/ACT inhaler, Inhale 2 puffs into the lungs every 6 hours as needed for Wheezing, Disp: 3 Inhaler, Rfl: 3    propranolol (INDERAL LA) 160 MG extended release capsule, TAKE 1 CAPSULE DAILY, Disp: 90 capsule, Rfl: 3    fluticasone-salmeterol (ADVAIR DISKUS) 250-50 MCG/DOSE AEPB, Inhale 1 puff into the lungs every 12 hours, Disp: 180 each, Rfl: 3    citalopram (CELEXA) 20 MG tablet, TAKE 1 TABLET DAILY, Disp: 90 tablet, Rfl: 3    glimepiride (AMARYL) 4 MG tablet, Take 1 tablet by mouth 2 times daily (before meals), Disp: 180 tablet, Rfl: 3    cetirizine (ZYRTEC) 10 MG tablet, Take 10 mg by mouth daily.   , Disp: , Rfl:     ondansetron (ZOFRAN) 4 MG tablet, Take 1 tablet by mouth every 8 hours as needed for Nausea (Patient not taking: Reported on 2021), Disp: 30 tablet, Rfl: 0      SOCIAL HISTORY     Social History     Social History Narrative    Not on file     Social History     Tobacco Use    Smoking status: Former Smoker     Packs/day: 0.50     Years: 14.00     Pack years: 7.00     Types: Cigarettes     Quit date: 10/18/1987     Years since quittin.8    Smokeless tobacco: Never Used   Vaping Use    Vaping Use: Never used   Substance Use Topics    Alcohol use: No    Drug use: No         ALLERGIES     Allergies   Allergen Reactions    Iodine Hives    Sulfa Antibiotics      FAMILY HISTORY     Family History   Problem Relation Age of Onset    Early Death Mother 62    Stroke Mother     High Blood Pressure Mother     Miscarriages / Stillbirths Mother     Cancer Father  Diabetes Father     Diabetes Brother     Diabetes Brother     Arthritis Neg Hx     Asthma Neg Hx     Birth Defects Neg Hx     Depression Neg Hx     Hearing Loss Neg Hx     Heart Disease Neg Hx     High Cholesterol Neg Hx     Kidney Disease Neg Hx     Learning Disabilities Neg Hx     Mental Illness Neg Hx     Mental Retardation Neg Hx     Substance Abuse Neg Hx     Vision Loss Neg Hx     Other Neg Hx      PREVIOUS RECORDS   Previous records reviewed: Patient was last seen in the emergency department on 5/29/2021 due to gross hematuria. Denise Landrum PHYSICAL EXAM     ED Triage Vitals [08/02/21 1130]   BP Temp Temp Source Pulse Resp SpO2 Height Weight   (!) 155/88 98.4 °F (36.9 °C) Oral 60 -- -- 5' 6\" (1.676 m) 248 lb (112.5 kg)     Initial vital signs and nursing assessment reviewed and abnormal from Hypertension. Body mass index is 40.03 kg/m². Pulsoximetry is normal per my interpretation. Additional Vital Signs:  Vitals:    08/02/21 1528   BP: (!) 199/92   Pulse: 62   Resp: 16   Temp:    SpO2: 97%       Physical Exam  Constitutional:       General: She is not in acute distress. Appearance: Normal appearance. She is not ill-appearing or diaphoretic. HENT:      Head: Normocephalic and atraumatic. Right Ear: Tympanic membrane, ear canal and external ear normal.      Left Ear: Tympanic membrane, ear canal and external ear normal.      Nose: Nose normal. No congestion or rhinorrhea. Mouth/Throat:      Mouth: Mucous membranes are moist.      Pharynx: No oropharyngeal exudate or posterior oropharyngeal erythema. Eyes:      General: No scleral icterus. Extraocular Movements: Extraocular movements intact. Conjunctiva/sclera: Conjunctivae normal.      Pupils: Pupils are equal, round, and reactive to light. Cardiovascular:      Rate and Rhythm: Normal rate and regular rhythm. Pulses: Normal pulses. Heart sounds: Normal heart sounds. No murmur heard. No friction rub.  No gallop. Pulmonary:      Effort: Pulmonary effort is normal. No respiratory distress. Breath sounds: Normal breath sounds. Chest:      Chest wall: No tenderness. Abdominal:      General: Bowel sounds are normal. There is no distension. Palpations: Abdomen is soft. Tenderness: There is no abdominal tenderness. There is no guarding or rebound. Musculoskeletal:         General: No swelling, tenderness or deformity. Normal range of motion. Cervical back: Normal range of motion and neck supple. No rigidity or tenderness. Skin:     General: Skin is warm and dry. Capillary Refill: Capillary refill takes less than 2 seconds. Coloration: Skin is not jaundiced or pale. Findings: No bruising, erythema, lesion or rash. Neurological:      General: No focal deficit present. Mental Status: She is alert and oriented to person, place, and time. Cranial Nerves: No cranial nerve deficit. Motor: No weakness. Gait: Gait normal.   Psychiatric:         Mood and Affect: Mood normal.         Behavior: Behavior is slowed. MEDICAL DECISION MAKING   Initial Assessment:   1. Headache     Differential diagnoses include but is not limited to migraine, tension headache, subarachnoid hematoma, giant cell arthritis, brain mets    Plan:    Migraine cocktail   CBC, BMP   IV fluids     Patient feels a lot better after medication. Discharge patient home with follow-up to PCP/oncology.     ED RESULTS   Laboratory results:  Labs Reviewed   CBC WITH AUTO DIFFERENTIAL - Abnormal; Notable for the following components:       Result Value    WBC 11.3 (*)     MCV 78.7 (*)     MPV 8.8 (*)     Segs Absolute 8.0 (*)     All other components within normal limits   BASIC METABOLIC PANEL - Abnormal; Notable for the following components:    CO2 22 (*)     Glucose 113 (*)     All other components within normal limits   GLOMERULAR FILTRATION RATE, ESTIMATED - Abnormal; Notable for the following

## 2021-08-03 LAB
EKG ATRIAL RATE: 64 BPM
EKG P AXIS: 33 DEGREES
EKG P-R INTERVAL: 160 MS
EKG Q-T INTERVAL: 462 MS
EKG QRS DURATION: 80 MS
EKG QTC CALCULATION (BAZETT): 476 MS
EKG R AXIS: -2 DEGREES
EKG T AXIS: 6 DEGREES
EKG VENTRICULAR RATE: 64 BPM

## 2021-08-23 DIAGNOSIS — F51.01 PRIMARY INSOMNIA: ICD-10-CM

## 2021-08-23 RX ORDER — TRAZODONE HYDROCHLORIDE 50 MG/1
TABLET ORAL
Qty: 270 TABLET | Refills: 0 | Status: SHIPPED | OUTPATIENT
Start: 2021-08-23 | End: 2021-01-01 | Stop reason: SDUPTHER

## 2021-08-23 NOTE — TELEPHONE ENCOUNTER
Birder Boas Short needs refill of   Requested Prescriptions     Pending Prescriptions Disp Refills    traZODone (DESYREL) 50 MG tablet [Pharmacy Med Name: traZODone HCl 50 MG Oral Tablet] 270 tablet 3     Sig: TAKE 2 TO 3 TABLETS BY  MOUTH AT NIGHT FOR SLEEP       Last Filled on:  6/1/2020    Last Visit Date:  6/23/2021    Next Visit Date:    Visit date not found

## 2021-09-03 ENCOUNTER — TELEPHONE (OUTPATIENT)
Dept: CARDIOLOGY CLINIC | Age: 66
End: 2021-09-03

## 2021-09-03 NOTE — TELEPHONE ENCOUNTER
Pt lmovm that she needs a pre op clearance appt for kidney mas. Called pt back, and got her vm to return call.

## 2021-09-07 ENCOUNTER — OFFICE VISIT (OUTPATIENT)
Dept: CARDIOLOGY CLINIC | Age: 66
End: 2021-09-07
Payer: COMMERCIAL

## 2021-09-07 VITALS
SYSTOLIC BLOOD PRESSURE: 132 MMHG | DIASTOLIC BLOOD PRESSURE: 84 MMHG | BODY MASS INDEX: 39.94 KG/M2 | HEIGHT: 66 IN | HEART RATE: 64 BPM | WEIGHT: 248.5 LBS

## 2021-09-07 DIAGNOSIS — I10 ESSENTIAL HYPERTENSION: ICD-10-CM

## 2021-09-07 DIAGNOSIS — Z01.818 PRE-OP EVALUATION: Primary | ICD-10-CM

## 2021-09-07 PROCEDURE — 99213 OFFICE O/P EST LOW 20 MIN: CPT | Performed by: NUCLEAR MEDICINE

## 2021-09-07 RX ORDER — ASPIRIN 81 MG/1
81 TABLET ORAL DAILY
COMMUNITY

## 2021-09-07 NOTE — PROGRESS NOTES
[facility] [address] [place] [address].  [address]  [facility] [address]  Dept: [phone]  Dept Fax: [phone]  Loc: [phone]    Visit Date: 9/7/2021    Sylvia Dotson is a 63 y.o. female who presents todayfor:  Chief Complaint   Patient presents with    Cardiac Clearance     kidney mass removal with Dr. Andrea Hennessy scheduled for 9-15-21    Hypertension    Diabetes   going for renal cancer surgery   BOTH KIDNEYs   Diagnosed lately   Known risk for CAD  No known CAD  No chest pain   Stress test end of 2019 was okay   No obvious angina  No other new symptoms  She is fairly active   No dizziness  No syncope        HPI:  HPI  Past Medical History:   Diagnosis Date    Allergic rhinitis     CKD stage 3 due to type 2 diabetes mellitus ([address]     Depression     DM type 2 causing CKD stage 3 ([address]     onset 2013.  Headache(784.0)     Hypertension     Multinodular goiter     Persistent proteinuria associated with type 2 diabetes mellitus ([address] 10/2016    urine micral of 20.       Pure hypercholesterolemia       Past Surgical History:   Procedure Laterality Date    DILATION AND CURETTAGE OF UTERUS  2016    x2     Family History   Problem Relation Age of Onset    Early Death Mother 62    Stroke Mother     High Blood Pressure Mother     Miscarriages / Stillbirths Mother     Cancer Father     Diabetes Father     Diabetes Brother     Diabetes Brother     Arthritis Neg Hx     Asthma Neg Hx     Birth Defects Neg Hx     Depression Neg Hx     Hearing Loss Neg Hx     Heart Disease Neg Hx     High Cholesterol Neg Hx     Kidney Disease Neg Hx     Learning Disabilities Neg Hx     Mental Illness Neg Hx     Mental Retardation Neg Hx     Substance Abuse Neg Hx     Vision Loss Neg Hx     Other Neg Hx      Social History     Tobacco Use    Smoking status: Former Smoker     Packs/day: 0.50     Years: 14.00     Pack years: 7.00     Types: Cigarettes Quit date: 10/18/1987     Years since quittin.9    Smokeless tobacco: Never Used   Substance Use Topics    Alcohol use: No      Current Outpatient Medications   Medication Sig Dispense Refill    aspirin 81 MG EC tablet Take 81 mg by mouth daily      traZODone (DESYREL) 50 MG tablet TAKE 2 TO 3 TABLETS BY  MOUTH AT NIGHT FOR SLEEP 270 tablet 0    axitinib (INLYTA) 5 MG tablet Take by mouth      metFORMIN (GLUCOPHAGE) 500 MG tablet Take 1 tablet by mouth 2 times daily (with meals) 180 tablet 1    CPAP Machine MISC by Does not apply route Please change her current Auto CPAP pressure settings I.e from Minimum CPAP of 6cm H20 and Maximum CPAP of 20cm H20 settings to a fixed CPAP pressure of 11cm H20 with EPR of 2. 1 each 0    albuterol sulfate  (90 Base) MCG/ACT inhaler Inhale 2 puffs into the lungs every 6 hours as needed for Wheezing 3 Inhaler 3    propranolol (INDERAL LA) 160 MG extended release capsule TAKE 1 CAPSULE DAILY 90 capsule 3    fluticasone-salmeterol (ADVAIR DISKUS) 250-50 MCG/DOSE AEPB Inhale 1 puff into the lungs every 12 hours 180 each 3    citalopram (CELEXA) 20 MG tablet TAKE 1 TABLET DAILY 90 tablet 3    glimepiride (AMARYL) 4 MG tablet Take 1 tablet by mouth 2 times daily (before meals) 180 tablet 3    cetirizine (ZYRTEC) 10 MG tablet Take 10 mg by mouth daily. No current facility-administered medications for this visit.      Allergies   Allergen Reactions    Iodine Hives    Sulfa Antibiotics      Health Maintenance   Topic Date Due    Hepatitis C screen  Never done    HIV screen  Never done    DTaP/Tdap/Td vaccine (1 - Tdap) Never done    Cervical cancer screen  2018    Diabetic retinal exam  2021    Flu vaccine (1) 2021    Lipid screen  10/10/2021    Diabetic foot exam  10/26/2021    Diabetic microalbuminuria test  10/26/2021    A1C test (Diabetic or Prediabetic)  2022    Potassium monitoring  2022    Creatinine monitoring 08/02/2022    Pneumococcal 0-64 years Vaccine (2 of 2 - PPSV23) 11/14/2022    Breast cancer screen  01/12/2023    Colon cancer screen colonoscopy  03/26/2026    Shingles Vaccine  Completed    COVID-19 Vaccine  Completed    Hepatitis A vaccine  Aged Out    Hib vaccine  Aged Out    Meningococcal (ACWY) vaccine  Aged Out       Subjective:  Review of Systems  General:   No fever, no chills, some fatigue or weight loss  Pulmonary:    some dyspnea, no wheezing  Cardiac:    Denies recent chest pain,   GI:     No nausea or vomiting, no abdominal pain  Neuro:     No dizziness or light headedness,   Musculoskeletal:  No recent active issues  Extremities:   No edema, no obvious claudication       Objective:  Physical Exam  /84   Pulse 64   Ht 5' 6\" (1.676 m)   Wt 248 lb 8 oz (112.7 kg)   LMP 12/29/2011   BMI 40.11 kg/m²   General:   Well developed, well nourished  Lungs:   Clear to auscultation  Heart:    Normal S1 S2, Slight murmur. no rubs, no gallops  Abdomen:   Soft, non tender, no organomegalies, positive bowel sounds  Extremities:   No edema, no cyanosis, good peripheral pulses  Neurological:   Awake, alert, oriented. No obvious focal deficits  Musculoskelatal:  No obvious deformities    Assessment:      Diagnosis Orders   1. Pre-op evaluation     2. Essential hypertension     as above  Cardiac fair for now   No new findings  . ECG in office was done today. I reviewed the ECG. No acute findings    Plan:  No follow-ups on file. As above  Clear for surgery   Mod risk   Continue risk factor modification and medical management  Thank you for allowing me to participate in the care of your patient. Please don't hesitate to contact me regarding any further issues related to the patient care    Orders Placed:  No orders of the defined types were placed in this encounter. Medications Prescribed:  No orders of the defined types were placed in this encounter.          Discussed use, benefit, and side effects of prescribed medications. All patient questions answered. Pt voicedunderstanding. Instructed to continue current medications, diet and exercise. Continue risk factor modification and medical management. Patient agreed with treatment plan. Follow up as directed.     Electronically signedby Johnathan Rivera MD on 9/7/2021 at 11:59 AM

## 2021-09-08 ENCOUNTER — TELEPHONE (OUTPATIENT)
Dept: CARDIOLOGY CLINIC | Age: 66
End: 2021-09-08

## 2021-09-08 NOTE — TELEPHONE ENCOUNTER
Patient is having surgery and was cleared by Dr. Grant De Anda on 9/7/2021. She is requesting that Dr. Grant De Anda review heart monitor that was done at [facility]. Unable to find results in care everywhere. Request for record faxed to [phone]. Patient wants to know once Dr. Grant De Anda has reviewed.

## 2021-09-10 NOTE — TELEPHONE ENCOUNTER
[facility] a fax [phone] but no record of heart monitor was received. I called [facility] at [phone] and was unable to talk to anyone and had to leave a message. I called and LM with a gentleman that picked up when I called Josephine Maldonado. Just wanting to clarify that patient wore a monitor and for how long? If she did where did she deliver the monitor?

## 2021-09-10 NOTE — TELEPHONE ENCOUNTER
Received a call from [facility] on nurse line and they state it is done by an outside vendor and they can't release it until it is final.   Patient notified. Keep following this as surgery is on 9/15/2021.

## 2021-09-10 NOTE — TELEPHONE ENCOUNTER
Spoke with Fidelia Thomson at [facility] [place]. Fidelia Thomson will have to send a email to her supervisor for these results.

## 2021-09-10 NOTE — TELEPHONE ENCOUNTER
Pt cleared for Kidney mass surgery 9-7-21. Surgery is 9-15-21. Please review Holter that is scanned below. Is pt still cleared for surgery?

## 2021-09-27 ENCOUNTER — APPOINTMENT (OUTPATIENT)
Dept: CT IMAGING | Age: 66
End: 2021-09-27
Payer: COMMERCIAL

## 2021-09-27 ENCOUNTER — HOSPITAL ENCOUNTER (EMERGENCY)
Age: 66
Discharge: HOME | End: 2021-09-28
Attending: EMERGENCY MEDICINE
Payer: COMMERCIAL

## 2021-09-27 VITALS
OXYGEN SATURATION: 95 % | BODY MASS INDEX: 40.35 KG/M2 | HEART RATE: 77 BPM | SYSTOLIC BLOOD PRESSURE: 118 MMHG | DIASTOLIC BLOOD PRESSURE: 52 MMHG | TEMPERATURE: 99.9 F | RESPIRATION RATE: 20 BRPM | WEIGHT: 250 LBS

## 2021-09-27 DIAGNOSIS — K59.03 DRUG-INDUCED CONSTIPATION: Primary | ICD-10-CM

## 2021-09-27 LAB
ALBUMIN SERPL-MCNC: 3.1 G/DL (ref 3.5–5.1)
ALP BLD-CCNC: 227 U/L (ref 38–126)
ALT SERPL-CCNC: 22 U/L (ref 11–66)
ANION GAP SERPL CALCULATED.3IONS-SCNC: 13 MEQ/L (ref 8–16)
AST SERPL-CCNC: 34 U/L (ref 5–40)
BASOPHILS # BLD: 0.1 %
BASOPHILS ABSOLUTE: 0 THOU/MM3 (ref 0–0.1)
BILIRUB SERPL-MCNC: 2.9 MG/DL (ref 0.3–1.2)
BUN BLDV-MCNC: 26 MG/DL (ref 7–22)
CALCIUM SERPL-MCNC: 8.9 MG/DL (ref 8.5–10.5)
CHLORIDE BLD-SCNC: 100 MEQ/L (ref 98–111)
CO2: 20 MEQ/L (ref 23–33)
CREAT SERPL-MCNC: 1.8 MG/DL (ref 0.4–1.2)
EOSINOPHIL # BLD: 0.3 %
EOSINOPHILS ABSOLUTE: 0 THOU/MM3 (ref 0–0.4)
ERYTHROCYTE [DISTWIDTH] IN BLOOD BY AUTOMATED COUNT: 17.2 % (ref 11.5–14.5)
ERYTHROCYTE [DISTWIDTH] IN BLOOD BY AUTOMATED COUNT: 56.3 FL (ref 35–45)
GFR SERPL CREATININE-BSD FRML MDRD: 28 ML/MIN/1.73M2
GLUCOSE BLD-MCNC: 162 MG/DL (ref 70–108)
HCT VFR BLD CALC: 23.1 % (ref 37–47)
HEMOGLOBIN: 7.3 GM/DL (ref 12–16)
IMMATURE GRANS (ABS): 0.18 THOU/MM3 (ref 0–0.07)
IMMATURE GRANULOCYTES: 1.1 %
LYMPHOCYTES # BLD: 8.8 %
LYMPHOCYTES ABSOLUTE: 1.5 THOU/MM3 (ref 1–4.8)
MCH RBC QN AUTO: 29 PG (ref 26–33)
MCHC RBC AUTO-ENTMCNC: 31.6 GM/DL (ref 32.2–35.5)
MCV RBC AUTO: 91.7 FL (ref 81–99)
MONOCYTES # BLD: 8.5 %
MONOCYTES ABSOLUTE: 1.4 THOU/MM3 (ref 0.4–1.3)
NUCLEATED RED BLOOD CELLS: 0 /100 WBC
OSMOLALITY CALCULATION: 274.7 MOSMOL/KG (ref 275–300)
PLATELET # BLD: 419 THOU/MM3 (ref 130–400)
PMV BLD AUTO: 8.9 FL (ref 9.4–12.4)
POTASSIUM REFLEX MAGNESIUM: 4.6 MEQ/L (ref 3.5–5.2)
RBC # BLD: 2.52 MILL/MM3 (ref 4.2–5.4)
SEG NEUTROPHILS: 81.2 %
SEGMENTED NEUTROPHILS ABSOLUTE COUNT: 13.4 THOU/MM3 (ref 1.8–7.7)
SODIUM BLD-SCNC: 133 MEQ/L (ref 135–145)
TOTAL PROTEIN: 6.2 G/DL (ref 6.1–8)
WBC # BLD: 16.5 THOU/MM3 (ref 4.8–10.8)

## 2021-09-27 PROCEDURE — 99283 EMERGENCY DEPT VISIT LOW MDM: CPT

## 2021-09-27 PROCEDURE — 36415 COLL VENOUS BLD VENIPUNCTURE: CPT

## 2021-09-27 PROCEDURE — 74176 CT ABD & PELVIS W/O CONTRAST: CPT

## 2021-09-27 PROCEDURE — 80053 COMPREHEN METABOLIC PANEL: CPT

## 2021-09-27 PROCEDURE — 85025 COMPLETE CBC W/AUTO DIFF WBC: CPT

## 2021-09-27 RX ORDER — POLYETHYLENE GLYCOL 3350 17 G/17G
17 POWDER, FOR SOLUTION ORAL DAILY PRN
Qty: 527 G | Refills: 1 | Status: SHIPPED | OUTPATIENT
Start: 2021-09-27 | End: 2021-01-01

## 2021-09-27 RX ORDER — OMEGA-3S/DHA/EPA/FISH OIL 1000-1400
2 CAPSULE,DELAYED RELEASE (ENTERIC COATED) ORAL DAILY
Qty: 60 TABLET | Refills: 5 | Status: SHIPPED | OUTPATIENT
Start: 2021-09-27 | End: 2022-01-01 | Stop reason: ALTCHOICE

## 2021-09-27 RX ORDER — SENNA AND DOCUSATE SODIUM 50; 8.6 MG/1; MG/1
2 TABLET, FILM COATED ORAL DAILY
Qty: 20 TABLET | Refills: 0 | Status: SHIPPED | OUTPATIENT
Start: 2021-09-27 | End: 2022-01-01 | Stop reason: ALTCHOICE

## 2021-09-27 ASSESSMENT — ENCOUNTER SYMPTOMS
COUGH: 0
DIARRHEA: 0
SINUS PRESSURE: 0
SHORTNESS OF BREATH: 0
BACK PAIN: 0
ABDOMINAL PAIN: 1
CONSTIPATION: 1
NAUSEA: 1
SINUS PAIN: 0
CHEST TIGHTNESS: 0
EYE PAIN: 0
VOMITING: 0

## 2021-09-27 ASSESSMENT — PAIN SCALES - GENERAL: PAINLEVEL_OUTOF10: 6

## 2021-09-27 NOTE — ED PROVIDER NOTES
[place] ENCOUNTER          Pt Name: Vidal Mcfarland  MRN: [id number]  [place] 1955  Date of evaluation: 9/27/2021  Physician: Adeel Dumont MD, [facility] [place]    CHIEF COMPLAINT       Chief Complaint   Patient presents with    Constipation     History obtained from chart review, the patient and patient's . HISTORY OF PRESENT ILLNESS    HPI  Vidal Mcfarland is a 63 y.o. female who presents to the emergency department for evaluation of constipation. Patient states she had partial left nephrectomy for cancer about a week ago and is pending total nephrectomy on the right. States that since the surgery has not been able to have a full bowel movement except for small pellets a couple of times. She is passing gas and has been hungry until today when she felt a little nauseous when she attempted to eat a piece of toast.  She is not nauseous at this moment and has not had emesis, has continued to drink fluids normally. Thinking this symptoms were caused by her pain medication patient stopped taking it yesterday but has not noticed improvement as he expected. She states that she was prescribed MiraLAX twice a day but she has been inconsistent with it. Denies distention, vomiting. The patient has no other acute complaints at this time. REVIEW OF SYSTEMS   Review of Systems   Constitutional: Negative for chills, fever and unexpected weight change. HENT: Negative for congestion, ear pain, nosebleeds, sinus pressure and sinus pain. Eyes: Negative for pain. Respiratory: Negative for cough, chest tightness and shortness of breath. Cardiovascular: Negative for chest pain. Gastrointestinal: Positive for abdominal pain, constipation and nausea. Negative for diarrhea and vomiting. Endocrine: Negative for polyuria. Genitourinary: Negative for dysuria and flank pain.    Musculoskeletal: Negative for arthralgias, back pain, myalgias and neck pain. Skin: Negative for rash. Neurological: Negative for weakness and headaches. All other systems reviewed and are negative. PAST MEDICAL AND SURGICAL HISTORY     Past Medical History:   Diagnosis Date    Allergic rhinitis     CKD stage 3 due to type 2 diabetes mellitus ([facility] [address]     Depression     DM type 2 causing CKD stage 3 ([facility] [address]     onset 2013.  Headache(784.0)     Hypertension     Multinodular goiter     Persistent proteinuria associated with type 2 diabetes mellitus ([facility] [address] 10/2016    urine micral of 20.  Pure hypercholesterolemia      Past Surgical History:   Procedure Laterality Date    DILATION AND CURETTAGE OF UTERUS  2016    x2         MEDICATIONS   No current facility-administered medications for this encounter.     Current Outpatient Medications:     ondansetron (ZOFRAN) 4 MG tablet, Take 1 tablet by mouth 3 times daily as needed for Nausea or Vomiting, Disp: 10 tablet, Rfl: 0    polyethylene glycol (MIRALAX) 17 g packet, Take 17 g by mouth daily as needed for Constipation Take 4 doses per day tomorrow and the day after, then take twice a day as prescribed by your surgeon, Disp: 527 g, Rfl: 1    sennosides-docusate sodium (SENOKOT-S) 8.6-50 MG tablet, Take 2 tablets by mouth daily, Disp: 20 tablet, Rfl: 0    Fiber Adult Gummies 2 g CHEW, Take 2 tablets by mouth daily, Disp: 60 tablet, Rfl: 5    aspirin 81 MG EC tablet, Take 81 mg by mouth daily, Disp: , Rfl:     traZODone (DESYREL) 50 MG tablet, TAKE 2 TO 3 TABLETS BY  MOUTH AT NIGHT FOR SLEEP, Disp: 270 tablet, Rfl: 0    axitinib (INLYTA) 5 MG tablet, Take by mouth, Disp: , Rfl:     metFORMIN (GLUCOPHAGE) 500 MG tablet, Take 1 tablet by mouth 2 times daily (with meals), Disp: 180 tablet, Rfl: 1    CPAP Machine MISC, by Does not apply route Please change her current Auto CPAP pressure settings I.e from Minimum CPAP of 6cm H20 and Maximum CPAP of 20cm H20 settings to a fixed CPAP pressure of 11cm H20 with EPR of 2., Disp: 1 each, Rfl: 0    albuterol sulfate  (90 Base) MCG/ACT inhaler, Inhale 2 puffs into the lungs every 6 hours as needed for Wheezing, Disp: 3 Inhaler, Rfl: 3    propranolol (INDERAL LA) 160 MG extended release capsule, TAKE 1 CAPSULE DAILY, Disp: 90 capsule, Rfl: 3    fluticasone-salmeterol (ADVAIR DISKUS) 250-50 MCG/DOSE AEPB, Inhale 1 puff into the lungs every 12 hours, Disp: 180 each, Rfl: 3    citalopram (CELEXA) 20 MG tablet, TAKE 1 TABLET DAILY, Disp: 90 tablet, Rfl: 3    glimepiride (AMARYL) 4 MG tablet, Take 1 tablet by mouth 2 times daily (before meals), Disp: 180 tablet, Rfl: 3    cetirizine (ZYRTEC) 10 MG tablet, Take 10 mg by mouth daily.   , Disp: , Rfl:       SOCIAL HISTORY     Social History     Social History Narrative    Not on file     Social History     Tobacco Use    Smoking status: Former Smoker     Packs/day: 0.50     Years: 14.00     Pack years: 7.00     Types: Cigarettes     Quit date: 10/18/1987     Years since quittin.9    Smokeless tobacco: Never Used   Vaping Use    Vaping Use: Never used   Substance Use Topics    Alcohol use: No    Drug use: No         ALLERGIES     Allergies   Allergen Reactions    Iodine Hives    Sulfa Antibiotics          FAMILY HISTORY     Family History   Problem Relation Age of Onset    Early Death Mother 62    Stroke Mother     High Blood Pressure Mother    [de-identified] / Stillbirths Mother     Cancer Father     Diabetes Father     Diabetes Brother     Diabetes Brother     Arthritis Neg Hx     Asthma Neg Hx     Birth Defects Neg Hx     Depression Neg Hx     Hearing Loss Neg Hx     Heart Disease Neg Hx     High Cholesterol Neg Hx     Kidney Disease Neg Hx     Learning Disabilities Neg Hx     Mental Illness Neg Hx     Mental Retardation Neg Hx     Substance Abuse Neg Hx     Vision Loss Neg Hx     Other Neg Hx          PREVIOUS RECORDS   Previous records reviewed:   Patient seen at the cardiologist office on September 7, 2021 for preop evaluation for essential hypertension. Before that, was seen on August 2, 2021 in the emergency department for headache and on May 29, 2021 for hematuria. PHYSICAL EXAM     ED Triage Vitals [09/27/21 1714]   BP Temp Temp Source Pulse Resp SpO2 Height Weight   119/72 99.9 °F (37.7 °C) Oral 78 20 97 % -- 250 lb (113.4 kg)     Initial vital signs and nursing assessment reviewed and normal. Body mass index is 40.35 kg/m². Pulsoximetry is normal per my interpretation. Additional Vital Signs:  Vitals:    09/27/21 2323   BP:    Pulse:    Resp:    Temp:    SpO2: 95%       Physical Exam  Vitals and nursing note reviewed. Constitutional:       General: She is not in acute distress. Appearance: Normal appearance. She is well-developed. HENT:      Head: Normocephalic and atraumatic. Right Ear: External ear normal.      Left Ear: External ear normal.      Nose: Nose normal.      Mouth/Throat:      Mouth: Mucous membranes are moist.   Eyes:      Conjunctiva/sclera: Conjunctivae normal.      Pupils: Pupils are equal, round, and reactive to light. Neck:      Vascular: No JVD. Cardiovascular:      Rate and Rhythm: Normal rate and regular rhythm. Heart sounds: Normal heart sounds. No murmur heard. No friction rub. No gallop. Pulmonary:      Effort: Pulmonary effort is normal.      Breath sounds: Normal breath sounds. No stridor. No wheezing or rales. Abdominal:      General: Abdomen is flat. Bowel sounds are normal. There is no distension. Palpations: Abdomen is soft. Tenderness: There is no abdominal tenderness. There is no guarding. Comments: Normal bowel sounds. Well-appearing healing surgical wound. There is a drain with yellow serous fluid versus urine. Musculoskeletal:      Cervical back: Neck supple. Skin:     General: Skin is warm and dry. Neurological:      Mental Status: She is alert and oriented to person, place, and time. Psychiatric:         Behavior: Behavior normal.             MEDICAL DECISION MAKING   Initial Assessment:   1. Postsurgical constipation, likely due to to medications  Plan:    We will give enema here, improve bowel regimen.  Reevaluation for possible discharge after treatment.  Labs have been ordered in triage prior to my evaluation. Patient seen during high ED demand and high ED volume due to SARS-Co-V-2/COVID19 pandemic. The care of this patient is provided during an unprecedented national emergency due to SARS-Co-V-2. SARS-Co-V-2 infection and transmission risks currently place heavy strains on healthcare resources. As this pandemic evolves, the hospital and providers strive to respond fluidly, to remain operational, and to provide care relative to available resources and information. Outcomes are unpredictable and treatments are without well-defined guidelines. Further, the impact of COVID-19 on all aspects of emergency care, including the impact of patient seeking care for reasons other than COVID-19 is unavoidable during this national emergency.           ED RESULTS   Laboratory results:  Labs Reviewed   CBC WITH AUTO DIFFERENTIAL - Abnormal; Notable for the following components:       Result Value    WBC 16.5 (*)     RBC 2.52 (*)     Hemoglobin 7.3 (*)     Hematocrit 23.1 (*)     MCHC 31.6 (*)     RDW-CV 17.2 (*)     RDW-SD 56.3 (*)     Platelets 684 (*)     MPV 8.9 (*)     Segs Absolute 13.4 (*)     Monocytes Absolute 1.4 (*)     Immature Grans (Abs) 0.18 (*)     All other components within normal limits   COMPREHENSIVE METABOLIC PANEL W/ REFLEX TO MG FOR LOW K - Abnormal; Notable for the following components:    Glucose 162 (*)     CREATININE 1.8 (*)     BUN 26 (*)     Sodium 133 (*)     CO2 20 (*)     Alkaline Phosphatase 227 (*)     Albumin 3.1 (*)     Total Bilirubin 2.9 (*)     All other components within normal limits   GLOMERULAR FILTRATION RATE, ESTIMATED - Abnormal; Notable for the following components:    Est, Glom Filt Rate 28 (*)     All other components within normal limits   OSMOLALITY - Abnormal; Notable for the following components:    Osmolality Calc 274.7 (*)     All other components within normal limits   ANION GAP       Radiologic studies results:  CT ABDOMEN PELVIS WO CONTRAST Additional Contrast? None   Final Result      1. Interval surgical changes of a left-sided partial nephrectomy. There is a large fluid collection arising from the inferior aspect of the kidney and there is a tiny focus of gas seen at the nondependent aspect (series #2 image #38). This could    represent a urinoma. An infectious process can't be excluded. 2. Redemonstration of a large heterogenous mass arising from the right kidney. 3. There is free fluid noted in the lower abdomen and pelvis. There is also a large amount of air within the superficial soft tissues overlying the left side of the abdomen. This is likely related to the recent surgery. There is also a fluid collection    within the ventral abdominal wall on the left side in the region where the surgical drain enters the abdomen. 4. There is no colonic wall thickening. There is no evidence of constipation. **This report has been created using voice recognition software. It may contain minor errors which are inherent in voice recognition technology. **      Final report electronically signed by Dr Aileen Garvin on 9/27/2021 9:11 PM                ED COURSE   ED Medications administered this visit: Medications - No data to display    ED Course as of Sep 28 0031   Mon Sep 27, 2021   1940 Reevaluated patient, feeling slightly better, currently has enema placed. No bowel movement yet. Abdomen remains soft and nontender. She feels comfortable going home and doing trial of laxatives over the next 24 to 48 hours.     [DT]   2033 Spoke with patient's daughter at the patient's request, both state they will be more at ease if imaging is obtained. Will obtain CT scan and reevaluate for final disposition. [DT]   2209 Paged patient's surgeon at [facility] to discuss case and further plan. [DT]   2315 Spoke with Dr. Driss Hernandez, urology resident at the [facility] who states he would not be able to be recommendations over the phone. Asks to call number back to be communicated with the attending. [DT]   2316 Urology attending paged call stat, states she is a pediatric urologist and request to call back to the consult number. [DT]   Tue Sep 28, 2021   0025 No call back from the patient's urology team.  I have called back to the on-call urology resident who will try to speak with the on-call attending, Dr. Delroy Bryan. They are agreeable to seeing the patient tomorrow instead of 30th. Will discharge. [DT]      ED Course User Index  [DT] Marisela Gordon MD     Strict return precautions and follow up instructions were discussed with the patient prior to discharge, with which the patient agrees. MEDICATION CHANGES     New Prescriptions    FIBER ADULT GUMMIES 2 G CHEW    Take 2 tablets by mouth daily    ONDANSETRON (ZOFRAN) 4 MG TABLET    Take 1 tablet by mouth 3 times daily as needed for Nausea or Vomiting    POLYETHYLENE GLYCOL (MIRALAX) 17 G PACKET    Take 17 g by mouth daily as needed for Constipation Take 4 doses per day tomorrow and the day after, then take twice a day as prescribed by your surgeon    SENNOSIDES-DOCUSATE SODIUM (SENOKOT-S) 8.6-50 MG TABLET    Take 2 tablets by mouth daily         FINAL DISPOSITION     Final diagnoses:   Drug-induced constipation     Condition: condition: good  Dispo: Discharge to home      This transcription was electronically signed. It was dictated by use of voice recognition software and electronically transcribed. The transcription may contain errors not detected in proofreading.         Marisela Gordon MD  09/28/21 1234

## 2021-09-27 NOTE — ED TRIAGE NOTES
Pt to the ED [facility] with c/o constipation. Pt states she had a partial kidney removal on 9/15/2021. Pt states she has not had a BM since then. States she has been taking OxyContin q 6 up until 2 days ago. Pt states she tried to eat something today and became nauseous. Pt reports passing gas still. States she is taking miralax and colase.

## 2021-09-28 RX ORDER — ONDANSETRON 4 MG/1
4 TABLET, FILM COATED ORAL 3 TIMES DAILY PRN
Qty: 10 TABLET | Refills: 0 | Status: SHIPPED | OUTPATIENT
Start: 2021-09-28 | End: 2022-01-01 | Stop reason: ALTCHOICE

## 2021-09-28 NOTE — ED NOTES
This nurse went to discharge pt, pt stating that she dosent feel comfortable leaving after talking with her dtr who is an Er MD. Pt requesting to take the remainder of the enema and to speak to the doctor about her discomfort with discharging. MD notified.       King Adan RN  09/27/21 [address], RN  09/27/21 2030

## 2021-10-29 NOTE — TELEPHONE ENCOUNTER
John Garcia Short needs refill of   Requested Prescriptions     Pending Prescriptions Disp Refills    propranolol (INDERAL LA) 160 MG extended release capsule [Pharmacy Med Name: PROPRANOLOL  160MG  CAP  EXTENDED RELEASE] 90 capsule 3     Sig: TAKE 1 CAPSULE BY MOUTH  DAILY    glimepiride (AMARYL) 4 MG tablet [Pharmacy Med Name: Glimepiride 4 MG Oral Tablet] 180 tablet 3     Sig: TAKE 1 TABLET BY MOUTH  TWICE DAILY (BEFORE MEALS)    pravastatin (PRAVACHOL) 40 MG tablet [Pharmacy Med Name: Pravastatin Sodium 40 MG Oral Tablet] 90 tablet 3     Sig: TAKE 1 TABLET BY MOUTH  DAILY    citalopram (CELEXA) 20 MG tablet [Pharmacy Med Name: Citalopram Hydrobromide 20 MG Oral Tablet] 90 tablet 3     Sig: TAKE 1 TABLET BY MOUTH  DAILY    metFORMIN (GLUCOPHAGE) 500 MG tablet [Pharmacy Med Name: metFORMIN HCl 500 MG Oral Tablet] 180 tablet 3     Sig: TAKE 1 TABLET BY MOUTH  TWICE DAILY WITH MEALS    traZODone (DESYREL) 50 MG tablet [Pharmacy Med Name: traZODone HCl 50 MG Oral Tablet] 270 tablet 3     Sig: TAKE 2 TO 3 TABLETS BY  MOUTH AT NIGHT FOR SLEEP       Last Filled on:      Last Visit Date:  6/23/2021    Next Visit Date:    Visit date not found    Patient is due for a physical. Please check and see  If she needs short term supply.

## 2021-11-01 PROBLEM — I10 PRIMARY HYPERTENSION: Status: ACTIVE | Noted: 2021-09-13

## 2021-11-01 PROBLEM — I63.512 ARTERIAL ISCHEMIC STROKE, MCA (MIDDLE CEREBRAL ARTERY), LEFT, ACUTE (HCC): Status: ACTIVE | Noted: 2021-08-06

## 2021-11-01 PROBLEM — E78.5 HYPERLIPIDEMIA: Status: ACTIVE | Noted: 2021-08-04

## 2021-11-01 PROBLEM — C64.9 RENAL CELL CARCINOMA (HCC): Status: ACTIVE | Noted: 2021-07-21

## 2021-11-01 NOTE — PROGRESS NOTES
Blood work drawn today in the office, venous puncture, right arm, pt tolerated well. Vaccine Information Sheet, \"Influenza - Inactivated\"  given to Mau Browne, or parent/legal guardian of  Mau Browne and verbalized understanding. Patient responses:    Have you ever had a reaction to a flu vaccine? No  Do you have an allergy to eggs, neomycin or polymixin? No  Do you have an allergy to Thimerosal, contact lens solution, or Merthiolate? No  Have you ever had Guillian Timnath Syndrome? No  Do you have any current illness? No  Do you have a temperature above 100 degrees? No  Are you pregnant? No  If pregnant, permission obtained from physician? No  Do you have an active neurological disorder? No      Flu vaccine given per order. Please see immunization tab.     Immunizations Administered     Name Date Dose Route    Influenza, MDCK Quadv, IM, PF (Flucelvax 2 yrs and older) 11/1/2021 0.5 mL Intramuscular    Site: Deltoid- Left    Lot: [id number]    NDC: 69668-043-15

## 2021-11-01 NOTE — PROGRESS NOTES
2021    Rachelle Shipley (:  1955) is a 63 y.o. female, here for a preventive medicine evaluation. Patient Active Problem List   Diagnosis    Migraine    Allergic rhinitis    Type II or unspecified type diabetes mellitus without mention of complication, not stated as uncontrolled    Depression    Obstructive sleep apnea    Snoring    Bruxism    Morning headache    Sleep disturbance    Sleep related choking sensation    Dry mouth    GERD (gastroesophageal reflux disease)    Pure hypercholesterolemia    Primary insomnia    DM type 2 causing CKD stage 3 (HCC)    CKD stage 3 due to type 2 diabetes mellitus [facility]    Arterial ischemic stroke, MCA (middle cerebral artery), left, acute ([facility]    Hyperlipidemia    Primary hypertension    Renal cell carcinoma (HCC)       Review of Systems   Constitutional: Positive for fatigue. Negative for chills, fever and unexpected weight change. HENT: Negative for congestion, ear pain, rhinorrhea and sore throat. Eyes: Negative for pain and visual disturbance. Respiratory: Negative for cough, chest tightness, shortness of breath and wheezing. Cardiovascular: Negative for chest pain and palpitations. Gastrointestinal: Negative for abdominal pain, blood in stool, constipation, diarrhea, nausea and vomiting. Genitourinary: Negative for difficulty urinating, frequency, hematuria and urgency. Musculoskeletal: Negative for back pain, joint swelling, myalgias and neck pain. Skin: Negative for rash. Neurological: Positive for weakness. Negative for dizziness and headaches. Hematological: Negative for adenopathy. Does not bruise/bleed easily. Psychiatric/Behavioral: Negative for behavioral problems and sleep disturbance. The patient is not nervous/anxious. Prior to Visit Medications    Medication Sig Taking?  Authorizing Provider   glimepiride (AMARYL) 4 MG tablet Take 1 tablet by mouth 2 times daily (before meals) Yes Ismael Desouza MD Juliano   citalopram (CELEXA) 20 MG tablet TAKE 1 TABLET DAILY Yes Frida Antony MD   propranolol (INDERAL LA) 160 MG extended release capsule TAKE 1 CAPSULE DAILY Yes Frida Antony MD   metFORMIN (GLUCOPHAGE) 500 MG tablet Take 1 tablet by mouth 2 times daily (with meals) Yes Frida Antony MD   traZODone (DESYREL) 50 MG tablet TAKE 2 TO 3 TABLETS BY  MOUTH AT NIGHT FOR SLEEP Yes Frida Antony MD   mupirocin (BACTROBAN) 2 % cream Apply 3 times daily. Yes Frida Antony MD   ondansetron (ZOFRAN) 4 MG tablet Take 1 tablet by mouth 3 times daily as needed for Nausea or Vomiting Yes Sharon Larios MD   sennosides-docusate sodium (SENOKOT-S) 8.6-50 MG tablet Take 2 tablets by mouth daily Yes Sharon Larios MD   Fiber Adult Gummies 2 g CHEW Take 2 tablets by mouth daily Yes Sharon Larios MD   aspirin 81 MG EC tablet Take 81 mg by mouth daily Yes Historical Provider, MD   axitinib (INLYTA) 5 MG tablet Take by mouth Yes Historical Provider, MD   CPAP Machine MISC by Does not apply route Please change her current Auto CPAP pressure settings I.e from Minimum CPAP of 6cm H20 and Maximum CPAP of 20cm H20 settings to a fixed CPAP pressure of 11cm H20 with EPR of 2. Yes Sarwat Kaur MD   albuterol sulfate  (90 Base) MCG/ACT inhaler Inhale 2 puffs into the lungs every 6 hours as needed for Wheezing Yes Frida Antony MD   fluticasone-salmeterol (ADVAIR DISKUS) 250-50 MCG/DOSE AEPB Inhale 1 puff into the lungs every 12 hours Yes Frida Antony MD   cetirizine (ZYRTEC) 10 MG tablet Take 10 mg by mouth daily. Yes Historical Provider, MD        Allergies   Allergen Reactions    Iodine Hives    Sulfa Antibiotics        Past Medical History:   Diagnosis Date    Allergic rhinitis     CKD stage 3 due to type 2 diabetes mellitus [facility]     Depression     DM type 2 causing CKD stage 3 ([facility] [facility]     onset 2013.     Headache(784.0)     Hypertension     Multinodular goiter     Persistent proteinuria associated with type 2 diabetes mellitus ([facility] [address] 10/2016    urine micral of 20.  Pure hypercholesterolemia        Past Surgical History:   Procedure Laterality Date    DILATION AND CURETTAGE OF UTERUS  2016    x2       Social History     Socioeconomic History    Marital status:      Spouse name: Not on file    Number of children: Not on file    Years of education: Not on file    Highest education level: Not on file   Occupational History    Not on file   Tobacco Use    Smoking status: Former Smoker     Packs/day: 0.50     Years: 14.00     Pack years: 7.00     Types: Cigarettes     Quit date: 10/18/1987     Years since quittin.0    Smokeless tobacco: Never Used   Vaping Use    Vaping Use: Never used   Substance and Sexual Activity    Alcohol use: No    Drug use: No    Sexual activity: Never   Other Topics Concern    Not on file   Social History Narrative    Not on file     Social Determinants of Health     Financial Resource Strain: Low Risk     Difficulty of Paying Living Expenses: Not hard at all   Food Insecurity: No Food Insecurity    Worried About [address] in the Last Year: Never true    [address] in the Last Year: Never true   Transportation Needs:     Lack of Transportation (Medical):      Lack of Transportation (Non-Medical):    Physical Activity:     Days of Exercise per Week:     Minutes of Exercise per Session:    Stress:     Feeling of Stress :    Social Connections:     Frequency of Communication with Friends and Family:     Frequency of Social Gatherings with Friends and Family:     Attends [employer] Services:     Active Member of Clubs or Organizations:     Attends Club or Organization Meetings:     Marital Status:    Intimate Partner Violence:     Fear of Current or Ex-Partner:     Emotionally Abused:     Physically Abused:     Sexually Abused:         Family History   Problem Relation Age of Onset    Early Death Mother 62    Stroke Mother     High Blood Pressure Mother     Miscarriages / Lodema Render Mother     Cancer Father     Diabetes Father     Diabetes Brother     Diabetes Brother     Arthritis Neg Hx     Asthma Neg Hx     Birth Defects Neg Hx     Depression Neg Hx     Hearing Loss Neg Hx     Heart Disease Neg Hx     High Cholesterol Neg Hx     Kidney Disease Neg Hx     Learning Disabilities Neg Hx     Mental Illness Neg Hx     Mental Retardation Neg Hx     Substance Abuse Neg Hx     Vision Loss Neg Hx     Other Neg Hx        ADVANCE DIRECTIVE: N, <no information>    Vitals:    11/01/21 1317   BP: 120/74   Pulse: 86   Resp: 18   Temp: 97.4 °F (36.3 °C)   TempSrc: Infrared   SpO2: 97%   Weight: 233 lb 6.4 oz (105.9 kg)   Height: 5' 5.4\" (1.661 m)     Estimated body mass index is 38.37 kg/m² as calculated from the following:    Height as of this encounter: 5' 5.4\" (1.661 m). Weight as of this encounter: 233 lb 6.4 oz (105.9 kg). Physical Exam  Vitals and nursing note reviewed. Constitutional:       Appearance: She is well-developed. HENT:      Head: Normocephalic and atraumatic. Right Ear: External ear normal.      Left Ear: External ear normal.      Nose: Nose normal.      Mouth/Throat:      Mouth: Mucous membranes are moist.   Eyes:      Pupils: Pupils are equal, round, and reactive to light. Neck:      Thyroid: No thyromegaly. Vascular: No carotid bruit. Cardiovascular:      Rate and Rhythm: Normal rate and regular rhythm. Heart sounds: Normal heart sounds. Pulmonary:      Breath sounds: Normal breath sounds. No wheezing or rales. Abdominal:      General: Bowel sounds are normal.      Palpations: Abdomen is soft. Tenderness: There is no abdominal tenderness. There is no guarding or rebound. Musculoskeletal:         General: Normal range of motion. Cervical back: Neck supple. Feet:      Comments: No open areas on the feet.   Sensation intact. Lymphadenopathy:      Cervical: No cervical adenopathy. Skin:     General: Skin is warm and dry. Findings: No rash. Neurological:      Mental Status: She is alert and oriented to person, place, and time. Cranial Nerves: No cranial nerve deficit. Deep Tendon Reflexes: Reflexes are normal and symmetric. No flowsheet data found. Lab Results   Component Value Date    CHOL 184 10/10/2020    CHOL 161 09/30/2019    CHOL 162 09/05/2018    TRIG 165 10/10/2020    TRIG 129 09/30/2019    TRIG 130 09/05/2018    HDL 46 10/10/2020    HDL 38 09/30/2019    HDL 41 09/05/2018    LDLCALC 110 10/10/2020    LDLCALC 97 09/30/2019    LDLCALC 95 09/05/2018    GLUCOSE 81 10/15/2021    LABA1C 8.3 05/13/2021    LABA1C 9.4 01/13/2021    LABA1C 9.0 10/10/2020       The ASCVD Risk score (Ry Taylor, et al., 2013) failed to calculate for the following reasons:     The patient has a prior MI or stroke diagnosis    Immunization History   Administered Date(s) Administered    COVID-19, Pfizer, PF, 30mcg/0.3mL 03/19/2021, 04/13/2021    Influenza Virus Vaccine 09/30/2018, 10/21/2020    Influenza, Quadv, IM, (6 mo and older Fluzone, Flulaval, Fluarix and 3 yrs and older Afluria) 10/19/2017    Influenza, Quadv, IM, PF (6 mo and older Fluzone, Flulaval, Fluarix, and 3 yrs and older Afluria) 10/21/2020    Pneumococcal Polysaccharide ([id number]) 10/13/2016    Zoster Recombinant (Shingrix) 03/19/2019, 08/15/2019       Health Maintenance   Topic Date Due    Hepatitis C screen  Never done    HIV screen  Never done    DTaP/Tdap/Td vaccine (1 - Tdap) Never done    Cervical cancer screen  Never done    Diabetic retinal exam  02/18/2021    Flu vaccine (1) 09/01/2021    COVID-19 Vaccine (3 - Pfizer booster) 10/13/2021    Diabetic foot exam  10/26/2021    Diabetic microalbuminuria test  10/26/2021    Lipid screen  10/10/2021    A1C test (Diabetic or Prediabetic)  05/13/2022    Potassium monitoring 10/15/2022    Creatinine monitoring  10/15/2022    Pneumococcal 0-64 years Vaccine (2 of 2 - PPSV23) 11/14/2022    Breast cancer screen  10/08/2023    Colon cancer screen colonoscopy  03/26/2026    Shingles Vaccine  Completed    Hepatitis A vaccine  Aged Out    Hib vaccine  Aged Out    Meningococcal (ACWY) vaccine  Aged Out          ASSESSMENT/PLAN:  1. Well adult exam  -     INFLUENZA, MDCK QUADV, 2 YRS AND OLDER, IM, PF, PREFILL SYR OR SDV, 0.5ML (FLUCELVAX QUADV, PF)  -     Lipid Panel; Future  -     POCT microalbumin  -     Hemoglobin A1C; Future  2. Type 2 diabetes mellitus with stage 3a chronic kidney disease, without long-term current use of insulin ([facility])  -     glimepiride (AMARYL) 4 MG tablet; Take 1 tablet by mouth 2 times daily (before meals), Disp-180 tablet, R-3Normal  -     Lipid Panel; Future  -     POCT microalbumin  -     Hemoglobin A1C; Future  3. Depression, unspecified depression type  -     citalopram (CELEXA) 20 MG tablet; TAKE 1 TABLET DAILY, Disp-90 tablet, R-3Normal  4. Nonintractable headache, unspecified chronicity pattern, unspecified headache type  -     propranolol (INDERAL LA) 160 MG extended release capsule; TAKE 1 CAPSULE DAILY, Disp-90 capsule, R-3Normal  5. Type 2 diabetes mellitus with stage 3 chronic kidney disease, without long-term current use of insulin, unspecified whether stage 3a or 3b CKD (HCC)  -     metFORMIN (GLUCOPHAGE) 500 MG tablet; Take 1 tablet by mouth 2 times daily (with meals), Disp-180 tablet, R-3Normal  6. Primary insomnia  -     traZODone (DESYREL) 50 MG tablet; TAKE 2 TO 3 TABLETS BY  MOUTH AT NIGHT FOR SLEEP, Disp-270 tablet, R-3Requesting 1 year supplyNormal  7. Impetigo  -     mupirocin (BACTROBAN) 2 % cream; Apply 3 times daily. , Disp-30 g, R-1, Normal  8.  Need for influenza vaccination  -     INFLUENZA, MDCK QUADV, 2 YRS AND OLDER, IM, PF, PREFILL SYR OR SDV, 0.5ML (FLUCELVAX QUADV, PF)    Orders Placed This Encounter   Procedures    INFLUENZA, MDCK QUADV, 2 YRS AND OLDER, IM, PF, PREFILL SYR OR SDV, 0.5ML (FLUCELVAX QUADV, PF)    Lipid Panel     Standing Status:   Future     Standing Expiration Date:   11/1/2022     Order Specific Question:   Is Patient Fasting?/# of Hours     Answer:   12    Hemoglobin A1C     Standing Status:   Future     Standing Expiration Date:   11/1/2022    POCT microalbumin   Encouraged diet, exercise and weight loss. Reviewed health maintenance      No follow-ups on file. An electronic signature was used to authenticate this note.     --Tammie Bueno MD on 11/1/2021 at 1:34 PM

## 2021-11-01 NOTE — PATIENT INSTRUCTIONS
(STIs). You can help prevent STIs if you wait to have sex with a new partner (or partners) until you've each been tested for STIs. It also helps if you use condoms (male or female condoms) and if you limit your sex partners to one person who only has sex with you. Vaccines are available for some STIs. · If you think you may have a problem with alcohol or drug use, talk to your doctor. This includes prescription medicines (such as amphetamines and opioids) and illegal drugs (such as cocaine and methamphetamine). Your doctor can help you figure out what type of treatment is best for you. · Protect your skin from too much sun. When you're outdoors from 10 a.m. to 4 p.m., stay in the shade or cover up with clothing and a hat with a wide brim. Wear sunglasses that block UV rays. Even when it's cloudy, put broad-spectrum sunscreen (SPF 30 or higher) on any exposed skin. · See a dentist one or two times a year for checkups and to have your teeth cleaned. · Wear a seat belt in the car. When should you call for help? Watch closely for changes in your health, and be sure to contact your doctor if you have any problems or symptoms that concern you. Where can you learn more? Go to [email or web address]://[email or web address]. org and sign in to your [employer] account. Enter X412 in the [facility] box to learn more about \"Well Visit, Women 50 to 72: Care Instructions. \"     If you do not have an account, please click on the \"Sign Up Now\" link. Current as of: February 11, 2021               Content Version: 13.0  © 1061-5624 Healthwise, Incorporated. Care instructions adapted under license by [facility] [facility] If you have questions about a medical condition or this instruction, always ask your healthcare professional. [place] [address] any warranty or liability for your use of this information.

## 2021-11-02 NOTE — TELEPHONE ENCOUNTER
----- Message from Antonino Paul MD sent at 11/2/2021  6:42 AM EDT -----  A1C is much lower at 5.9. ( 8.3 last time). Cholesterol at 188 with normal CRR. Recheck a1c in 6 months.

## 2021-11-30 NOTE — TELEPHONE ENCOUNTER
Pt called, states she is having surgery at [facility] on Monday 12/06/2021. She is required to have COVID testing prior to surgery. Pt was notified she needs to contact her surgeons office to request COVID orders. She wanted Dr Meier Brought to order test. I explained to her this order needs to ordered by the surgeons office, otherwise they might not receive results prior to scheduled surgery. Pt was frustrated with the process and abruptly ended the call.

## 2022-01-01 ENCOUNTER — TELEPHONE (OUTPATIENT)
Dept: NEPHROLOGY | Age: 67
End: 2022-01-01

## 2022-01-01 ENCOUNTER — OFFICE VISIT (OUTPATIENT)
Dept: FAMILY MEDICINE CLINIC | Age: 67
End: 2022-01-01
Payer: COMMERCIAL

## 2022-01-01 ENCOUNTER — HOSPITAL ENCOUNTER (OUTPATIENT)
Dept: NURSING | Age: 67
Discharge: HOME | End: 2022-08-03
Payer: COMMERCIAL

## 2022-01-01 ENCOUNTER — TELEPHONE (OUTPATIENT)
Dept: FAMILY MEDICINE CLINIC | Age: 67
End: 2022-01-01

## 2022-01-01 ENCOUNTER — OFFICE VISIT (OUTPATIENT)
Dept: NEPHROLOGY | Age: 67
End: 2022-01-01
Payer: COMMERCIAL

## 2022-01-01 ENCOUNTER — HOSPITAL ENCOUNTER (OUTPATIENT)
Dept: INTERVENTIONAL RADIOLOGY/VASCULAR | Age: 67
Discharge: HOME | End: 2022-09-06
Payer: COMMERCIAL

## 2022-01-01 ENCOUNTER — NURSE ONLY (OUTPATIENT)
Dept: LAB | Age: 67
End: 2022-01-01

## 2022-01-01 ENCOUNTER — HOSPITAL ENCOUNTER (OUTPATIENT)
Dept: GENERAL RADIOLOGY | Age: 67
Discharge: HOME | End: 2022-03-29
Payer: COMMERCIAL

## 2022-01-01 ENCOUNTER — HOSPITAL ENCOUNTER (OUTPATIENT)
Age: 67
Discharge: HOME | End: 2022-03-29
Payer: COMMERCIAL

## 2022-01-01 VITALS
RESPIRATION RATE: 18 BRPM | SYSTOLIC BLOOD PRESSURE: 118 MMHG | BODY MASS INDEX: 38.38 KG/M2 | OXYGEN SATURATION: 94 % | DIASTOLIC BLOOD PRESSURE: 68 MMHG | WEIGHT: 237.8 LBS | HEART RATE: 83 BPM

## 2022-01-01 VITALS
WEIGHT: 238 LBS | BODY MASS INDEX: 38.25 KG/M2 | OXYGEN SATURATION: 98 % | DIASTOLIC BLOOD PRESSURE: 62 MMHG | HEIGHT: 66 IN | SYSTOLIC BLOOD PRESSURE: 112 MMHG | TEMPERATURE: 97.2 F | HEART RATE: 66 BPM

## 2022-01-01 VITALS
RESPIRATION RATE: 18 BRPM | OXYGEN SATURATION: 95 % | SYSTOLIC BLOOD PRESSURE: 124 MMHG | HEART RATE: 87 BPM | DIASTOLIC BLOOD PRESSURE: 74 MMHG | TEMPERATURE: 97 F | BODY MASS INDEX: 39.19 KG/M2 | WEIGHT: 242.8 LBS

## 2022-01-01 VITALS
DIASTOLIC BLOOD PRESSURE: 50 MMHG | SYSTOLIC BLOOD PRESSURE: 84 MMHG | OXYGEN SATURATION: 98 % | BODY MASS INDEX: 38.41 KG/M2 | WEIGHT: 238 LBS | HEART RATE: 80 BPM

## 2022-01-01 VITALS
DIASTOLIC BLOOD PRESSURE: 60 MMHG | WEIGHT: 232 LBS | OXYGEN SATURATION: 98 % | HEART RATE: 76 BPM | SYSTOLIC BLOOD PRESSURE: 98 MMHG | BODY MASS INDEX: 37.45 KG/M2

## 2022-01-01 VITALS
OXYGEN SATURATION: 97 % | TEMPERATURE: 96.9 F | DIASTOLIC BLOOD PRESSURE: 56 MMHG | SYSTOLIC BLOOD PRESSURE: 121 MMHG | HEART RATE: 77 BPM | RESPIRATION RATE: 18 BRPM

## 2022-01-01 VITALS
TEMPERATURE: 96.5 F | RESPIRATION RATE: 13 BRPM | DIASTOLIC BLOOD PRESSURE: 57 MMHG | SYSTOLIC BLOOD PRESSURE: 103 MMHG | HEART RATE: 99 BPM | OXYGEN SATURATION: 92 %

## 2022-01-01 DIAGNOSIS — E87.5 HYPERKALEMIA: Primary | ICD-10-CM

## 2022-01-01 DIAGNOSIS — E78.00 HYPERCHOLESTEROLEMIA: ICD-10-CM

## 2022-01-01 DIAGNOSIS — N18.4 CKD (CHRONIC KIDNEY DISEASE) STAGE 4, GFR 15-29 ML/MIN (HCC): Primary | ICD-10-CM

## 2022-01-01 DIAGNOSIS — N18.4 CKD (CHRONIC KIDNEY DISEASE) STAGE 4, GFR 15-29 ML/MIN (HCC): ICD-10-CM

## 2022-01-01 DIAGNOSIS — N18.31 TYPE 2 DIABETES MELLITUS WITH STAGE 3A CHRONIC KIDNEY DISEASE, WITHOUT LONG-TERM CURRENT USE OF INSULIN (HCC): Primary | ICD-10-CM

## 2022-01-01 DIAGNOSIS — N17.9 AKI (ACUTE KIDNEY INJURY) (HCC): ICD-10-CM

## 2022-01-01 DIAGNOSIS — N10 ACUTE INTERSTITIAL NEPHRITIS: ICD-10-CM

## 2022-01-01 DIAGNOSIS — F32.A DEPRESSION, UNSPECIFIED DEPRESSION TYPE: ICD-10-CM

## 2022-01-01 DIAGNOSIS — N18.30 CKD STAGE 3 DUE TO TYPE 2 DIABETES MELLITUS (HCC): Primary | ICD-10-CM

## 2022-01-01 DIAGNOSIS — C64.1 MALIGNANT NEOPLASM OF RIGHT KIDNEY, EXCEPT RENAL PELVIS (HCC): ICD-10-CM

## 2022-01-01 DIAGNOSIS — N18.30 TYPE 2 DIABETES MELLITUS WITH STAGE 3 CHRONIC KIDNEY DISEASE, WITHOUT LONG-TERM CURRENT USE OF INSULIN, UNSPECIFIED WHETHER STAGE 3A OR 3B CKD (HCC): Primary | ICD-10-CM

## 2022-01-01 DIAGNOSIS — C64.1 RENAL CELL CARCINOMA OF RIGHT KIDNEY (HCC): ICD-10-CM

## 2022-01-01 DIAGNOSIS — C64.2 MALIGNANT NEOPLASM OF LEFT KIDNEY, EXCEPT RENAL PELVIS (HCC): ICD-10-CM

## 2022-01-01 DIAGNOSIS — E87.5 HYPERKALEMIA: ICD-10-CM

## 2022-01-01 DIAGNOSIS — R18.8 OTHER ASCITES: ICD-10-CM

## 2022-01-01 DIAGNOSIS — N18.30 TYPE 2 DIABETES MELLITUS WITH STAGE 3 CHRONIC KIDNEY DISEASE, WITHOUT LONG-TERM CURRENT USE OF INSULIN, UNSPECIFIED WHETHER STAGE 3A OR 3B CKD (HCC): ICD-10-CM

## 2022-01-01 DIAGNOSIS — N17.9 AKI (ACUTE KIDNEY INJURY) (HCC): Primary | ICD-10-CM

## 2022-01-01 DIAGNOSIS — E11.22 TYPE 2 DIABETES MELLITUS WITH STAGE 3A CHRONIC KIDNEY DISEASE, WITHOUT LONG-TERM CURRENT USE OF INSULIN (HCC): Primary | ICD-10-CM

## 2022-01-01 DIAGNOSIS — E11.22 TYPE 2 DIABETES MELLITUS WITH STAGE 3 CHRONIC KIDNEY DISEASE, WITHOUT LONG-TERM CURRENT USE OF INSULIN, UNSPECIFIED WHETHER STAGE 3A OR 3B CKD (HCC): Primary | ICD-10-CM

## 2022-01-01 DIAGNOSIS — F51.01 PRIMARY INSOMNIA: ICD-10-CM

## 2022-01-01 DIAGNOSIS — E11.22 CKD STAGE 3 DUE TO TYPE 2 DIABETES MELLITUS (HCC): Primary | ICD-10-CM

## 2022-01-01 DIAGNOSIS — E11.22 TYPE 2 DIABETES MELLITUS WITH STAGE 3 CHRONIC KIDNEY DISEASE, WITHOUT LONG-TERM CURRENT USE OF INSULIN, UNSPECIFIED WHETHER STAGE 3A OR 3B CKD (HCC): ICD-10-CM

## 2022-01-01 DIAGNOSIS — R14.0 ABDOMINAL DISTENSION: Primary | ICD-10-CM

## 2022-01-01 LAB
A/G RATIO: NORMAL
ALBUMIN SERPL-MCNC: 4.1 G/DL (ref 3.5–5.1)
ALBUMIN SERPL-MCNC: 4.2 G/DL
ALBUMIN SERPL-MCNC: 4.3 G/DL
ALP BLD-CCNC: 123 U/L
ALP BLD-CCNC: 151 U/L (ref 38–126)
ALP BLD-CCNC: 168 U/L
ALT SERPL-CCNC: 16 U/L
ALT SERPL-CCNC: 6 U/L (ref 11–66)
ALT SERPL-CCNC: 9 U/L
ANION GAP SERPL CALCULATED.3IONS-SCNC: 11 MEQ/L (ref 8–16)
ANION GAP SERPL CALCULATED.3IONS-SCNC: 12 MEQ/L (ref 8–16)
ANION GAP SERPL CALCULATED.3IONS-SCNC: 15 MEQ/L (ref 8–16)
ANION GAP SERPL CALCULATED.3IONS-SCNC: 16 MEQ/L (ref 8–16)
ANION GAP SERPL CALCULATED.3IONS-SCNC: 16 MEQ/L (ref 8–16)
ANION GAP SERPL CALCULATED.3IONS-SCNC: NORMAL MMOL/L
AST SERPL-CCNC: 14 U/L
AST SERPL-CCNC: 15 U/L
AST SERPL-CCNC: 9 U/L (ref 5–40)
AVERAGE GLUCOSE: 195 MG/DL (ref 70–126)
BACTERIA: ABNORMAL
BASOPHILS # BLD: 0.4 %
BASOPHILS ABSOLUTE: 0 THOU/MM3 (ref 0–0.1)
BASOPHILS ABSOLUTE: 0.1 /ΜL
BASOPHILS ABSOLUTE: ABNORMAL
BASOPHILS RELATIVE PERCENT: 0.5 %
BASOPHILS RELATIVE PERCENT: ABNORMAL
BILIRUB SERPL-MCNC: 0.2 MG/DL (ref 0.1–1.4)
BILIRUB SERPL-MCNC: 0.2 MG/DL (ref 0.3–1.2)
BILIRUB SERPL-MCNC: 0.3 MG/DL (ref 0.1–1.4)
BILIRUBIN DIRECT: 0.2 MG/DL
BILIRUBIN URINE: NEGATIVE
BILIRUBIN, INDIRECT: NORMAL
BLOOD, URINE: ABNORMAL
BUN BLDV-MCNC: 29 MG/DL (ref 7–22)
BUN BLDV-MCNC: 32 MG/DL (ref 7–22)
BUN BLDV-MCNC: 35 MG/DL (ref 7–22)
BUN BLDV-MCNC: 39 MG/DL
BUN BLDV-MCNC: 46 MG/DL (ref 7–22)
BUN BLDV-MCNC: 49 MG/DL
BUN BLDV-MCNC: 51 MG/DL
BUN BLDV-MCNC: 62 MG/DL
BUN BLDV-MCNC: 67 MG/DL (ref 7–22)
CALCIUM SERPL-MCNC: 1.21 MG/DL
CALCIUM SERPL-MCNC: 1.23 MG/DL
CALCIUM SERPL-MCNC: 1.26 MG/DL
CALCIUM SERPL-MCNC: 9.3 MG/DL (ref 8.5–10.5)
CALCIUM SERPL-MCNC: 9.5 MG/DL (ref 8.5–10.5)
CALCIUM SERPL-MCNC: NORMAL MG/DL
CASTS: ABNORMAL /LPF
CASTS: ABNORMAL /LPF
CHARACTER, URINE: CLEAR
CHLORIDE BLD-SCNC: 101 MEQ/L (ref 98–111)
CHLORIDE BLD-SCNC: 102 MEQ/L (ref 98–111)
CHLORIDE BLD-SCNC: 102 MMOL/L
CHLORIDE BLD-SCNC: 102 MMOL/L
CHLORIDE BLD-SCNC: 103 MEQ/L (ref 98–111)
CHLORIDE BLD-SCNC: 103 MMOL/L
CHLORIDE BLD-SCNC: 106 MMOL/L
CHLORIDE BLD-SCNC: 98 MEQ/L (ref 98–111)
CHLORIDE BLD-SCNC: 99 MEQ/L (ref 98–111)
CHOLESTEROL, TOTAL: 178 MG/DL (ref 100–199)
CO2: 17 MEQ/L (ref 23–33)
CO2: 21 MEQ/L (ref 23–33)
CO2: 21 MEQ/L (ref 23–33)
CO2: 22 MEQ/L (ref 23–33)
CO2: 22 MMOL/L
CO2: 23 MEQ/L (ref 23–33)
CO2: 24 MMOL/L
CO2: 26 MMOL/L
CO2: 28 MMOL/L
COLOR: YELLOW
CREAT SERPL-MCNC: 2.4 MG/DL (ref 0.4–1.2)
CREAT SERPL-MCNC: 2.4 MG/DL (ref 0.4–1.2)
CREAT SERPL-MCNC: 2.5 MG/DL (ref 0.4–1.2)
CREAT SERPL-MCNC: 2.6 MG/DL
CREAT SERPL-MCNC: 2.6 MG/DL (ref 0.4–1.2)
CREAT SERPL-MCNC: 2.7 MG/DL (ref 0.4–1.2)
CREAT SERPL-MCNC: 2.8 MG/DL
CREAT SERPL-MCNC: 3.2 MG/DL
CREAT SERPL-MCNC: 3.2 MG/DL
CREATININE URINE POCT: NORMAL
CRYSTALS: ABNORMAL
EOSINOPHIL # BLD: 2.2 %
EOSINOPHIL SMEAR, URINE: NORMAL
EOSINOPHILS ABSOLUTE: 0.2 THOU/MM3 (ref 0–0.4)
EOSINOPHILS ABSOLUTE: 0.3 /ΜL
EOSINOPHILS ABSOLUTE: ABNORMAL
EOSINOPHILS RELATIVE PERCENT: 3.6 %
EOSINOPHILS RELATIVE PERCENT: ABNORMAL
EPITHELIAL CELLS, UA: ABNORMAL /HPF
ERYTHROCYTE [DISTWIDTH] IN BLOOD BY AUTOMATED COUNT: 14.6 % (ref 11.5–14.5)
ERYTHROCYTE [DISTWIDTH] IN BLOOD BY AUTOMATED COUNT: 14.7 % (ref 11.5–14.5)
ERYTHROCYTE [DISTWIDTH] IN BLOOD BY AUTOMATED COUNT: 15 % (ref 11.5–14.5)
ERYTHROCYTE [DISTWIDTH] IN BLOOD BY AUTOMATED COUNT: 44.7 FL (ref 35–45)
ERYTHROCYTE [DISTWIDTH] IN BLOOD BY AUTOMATED COUNT: 44.8 FL (ref 35–45)
ERYTHROCYTE [DISTWIDTH] IN BLOOD BY AUTOMATED COUNT: 45.1 FL (ref 35–45)
GFR CALCULATED: 15
GFR CALCULATED: 15
GFR CALCULATED: 18
GFR CALCULATED: 20
GFR SERPL CREATININE-BSD FRML MDRD: 18 ML/MIN/1.73M2
GFR SERPL CREATININE-BSD FRML MDRD: 18 ML/MIN/1.73M2
GFR SERPL CREATININE-BSD FRML MDRD: 19 ML/MIN/1.73M2
GFR SERPL CREATININE-BSD FRML MDRD: 20 ML/MIN/1.73M2
GFR SERPL CREATININE-BSD FRML MDRD: 20 ML/MIN/1.73M2
GLOBULIN: NORMAL
GLUCOSE BLD-MCNC: 156 MG/DL
GLUCOSE BLD-MCNC: 164 MG/DL (ref 70–108)
GLUCOSE BLD-MCNC: 185 MG/DL (ref 70–108)
GLUCOSE BLD-MCNC: 235 MG/DL (ref 70–108)
GLUCOSE BLD-MCNC: 278 MG/DL (ref 70–108)
GLUCOSE BLD-MCNC: 295 MG/DL (ref 70–108)
GLUCOSE BLD-MCNC: 329 MG/DL
GLUCOSE BLD-MCNC: 390 MG/DL
GLUCOSE BLD-MCNC: NORMAL MG/DL
GLUCOSE, URINE: 500 MG/DL
HBA1C MFR BLD: 8.5 % (ref 4.4–6.4)
HCT VFR BLD CALC: 28.6 % (ref 37–47)
HCT VFR BLD CALC: 33.6 % (ref 36–46)
HCT VFR BLD CALC: 33.6 % (ref 37–47)
HCT VFR BLD CALC: 34.1 % (ref 36–46)
HCT VFR BLD CALC: 34.8 % (ref 36–46)
HCT VFR BLD CALC: 35.3 % (ref 36–46)
HCT VFR BLD CALC: 35.5 % (ref 36–46)
HCT VFR BLD CALC: 36.1 % (ref 37–47)
HDLC SERPL-MCNC: 34 MG/DL
HEMOGLOBIN: 10.2 GM/DL (ref 12–16)
HEMOGLOBIN: 11 G/DL (ref 12–16)
HEMOGLOBIN: 11.1 G/DL (ref 12–16)
HEMOGLOBIN: 11.3 GM/DL (ref 12–16)
HEMOGLOBIN: 11.5 G/DL (ref 12–16)
HEMOGLOBIN: 11.7 G/DL (ref 12–16)
HEMOGLOBIN: 11.7 G/DL (ref 12–16)
HEMOGLOBIN: 8.7 GM/DL (ref 12–16)
IMMATURE GRANS (ABS): 0.06 THOU/MM3 (ref 0–0.07)
IMMATURE GRANULOCYTES: 0.6 %
KETONES, URINE: NEGATIVE
LDL CHOLESTEROL CALCULATED: 84 MG/DL
LEUKOCYTE EST, POC: ABNORMAL
LYMPHOCYTES # BLD: 17 %
LYMPHOCYTES ABSOLUTE: 1.1 /ΜL
LYMPHOCYTES ABSOLUTE: 1.6 THOU/MM3 (ref 1–4.8)
LYMPHOCYTES ABSOLUTE: ABNORMAL
LYMPHOCYTES RELATIVE PERCENT: 11.8 %
LYMPHOCYTES RELATIVE PERCENT: ABNORMAL
MCH RBC QN AUTO: 25 PG (ref 26–33)
MCH RBC QN AUTO: 26 PG (ref 26–33)
MCH RBC QN AUTO: 26.2 PG (ref 26–33)
MCH RBC QN AUTO: 26.8 PG
MCH RBC QN AUTO: ABNORMAL PG
MCHC RBC AUTO-ENTMCNC: 30.4 GM/DL (ref 32.2–35.5)
MCHC RBC AUTO-ENTMCNC: 30.4 GM/DL (ref 32.2–35.5)
MCHC RBC AUTO-ENTMCNC: 31.3 GM/DL (ref 32.2–35.5)
MCHC RBC AUTO-ENTMCNC: 32.7 G/DL
MCHC RBC AUTO-ENTMCNC: ABNORMAL G/DL
MCV RBC AUTO: 81.8 FL
MCV RBC AUTO: 82.2 FL (ref 81–99)
MCV RBC AUTO: 83.6 FL (ref 81–99)
MCV RBC AUTO: 85.5 FL (ref 81–99)
MCV RBC AUTO: ABNORMAL FL
MICROALBUMIN/CREAT 24H UR: NORMAL MG/G{CREAT}
MICROALBUMIN/CREAT UR-RTO: NORMAL
MISCELLANEOUS LAB TEST RESULT: ABNORMAL
MONOCYTES # BLD: 6.9 %
MONOCYTES ABSOLUTE: 0.7 /ΜL
MONOCYTES ABSOLUTE: 0.7 THOU/MM3 (ref 0.4–1.3)
MONOCYTES ABSOLUTE: ABNORMAL
MONOCYTES RELATIVE PERCENT: 7.9 %
MONOCYTES RELATIVE PERCENT: ABNORMAL
NEUTROPHILS ABSOLUTE: 7.1 /ΜL
NEUTROPHILS ABSOLUTE: ABNORMAL
NEUTROPHILS RELATIVE PERCENT: 76 %
NEUTROPHILS RELATIVE PERCENT: ABNORMAL
NITRITE, URINE: NEGATIVE
NUCLEATED RED BLOOD CELLS: 0 /100 WBC
PH UA: 5.5 (ref 5–9)
PHOSPHORUS: 3.1 MG/DL (ref 2.4–4.7)
PLATELET # BLD: 265 K/ΜL
PLATELET # BLD: 268 THOU/MM3 (ref 130–400)
PLATELET # BLD: 313 K/ΜL
PLATELET # BLD: 321 K/ΜL
PLATELET # BLD: 336 K/ΜL
PLATELET # BLD: 388 K/ΜL
PLATELET # BLD: 417 THOU/MM3 (ref 130–400)
PLATELET # BLD: 532 THOU/MM3 (ref 130–400)
PMV BLD AUTO: 10 FL (ref 9.4–12.4)
PMV BLD AUTO: 8.7 FL
PMV BLD AUTO: 8.7 FL (ref 9.4–12.4)
PMV BLD AUTO: 9.3 FL (ref 9.4–12.4)
PMV BLD AUTO: ABNORMAL FL
POTASSIUM SERPL-SCNC: 4.5 MMOL/L
POTASSIUM SERPL-SCNC: 4.6 MMOL/L
POTASSIUM SERPL-SCNC: 4.7 MMOL/L
POTASSIUM SERPL-SCNC: 4.8 MMOL/L
POTASSIUM SERPL-SCNC: 4.9 MEQ/L (ref 3.5–5.2)
POTASSIUM SERPL-SCNC: 5.1 MEQ/L (ref 3.5–5.2)
POTASSIUM SERPL-SCNC: 5.4 MEQ/L (ref 3.5–5.2)
POTASSIUM SERPL-SCNC: 5.5 MEQ/L (ref 3.5–5.2)
POTASSIUM SERPL-SCNC: 5.6 MEQ/L (ref 3.5–5.2)
PROTEIN TOTAL: 7.2 G/DL
PROTEIN UA: 30 MG/DL
PTH INTACT: 35.3 PG/ML (ref 15–65)
RBC # BLD: 3.48 MILL/MM3 (ref 4.2–5.4)
RBC # BLD: 3.93 MILL/MM3 (ref 4.2–5.4)
RBC # BLD: 4.11 10^6/ΜL
RBC # BLD: 4.15 10^6/ΜL
RBC # BLD: 4.26 10^6/ΜL
RBC # BLD: 4.31 10^6/ΜL
RBC # BLD: 4.32 MILL/MM3 (ref 4.2–5.4)
RBC # BLD: 4.38 10^6/ΜL
RBC URINE: ABNORMAL /HPF
RENAL EPITHELIAL, UA: ABNORMAL
SEG NEUTROPHILS: 72.9 %
SEGMENTED NEUTROPHILS ABSOLUTE COUNT: 7 THOU/MM3 (ref 1.8–7.7)
SODIUM BLD-SCNC: 131 MEQ/L (ref 135–145)
SODIUM BLD-SCNC: 134 MMOL/L
SODIUM BLD-SCNC: 135 MEQ/L (ref 135–145)
SODIUM BLD-SCNC: 136 MEQ/L (ref 135–145)
SODIUM BLD-SCNC: 136 MMOL/L
SODIUM BLD-SCNC: 137 MEQ/L (ref 135–145)
SODIUM BLD-SCNC: 138 MEQ/L (ref 135–145)
SODIUM BLD-SCNC: 138 MMOL/L
SODIUM BLD-SCNC: 138 MMOL/L
SPECIFIC GRAVITY UA: 1.02 (ref 1–1.03)
T3 FREE: 2.25 PG/ML (ref 2.02–4.43)
T4 FREE: 1.25 NG/DL (ref 0.93–1.76)
T4 FREE: 1.75
TOTAL PROTEIN: 7.2 G/DL (ref 6.1–8)
TOTAL PROTEIN: 7.7
TRIGL SERPL-MCNC: 299 MG/DL (ref 0–199)
TSH RECEPTOR AB: < 0.8 IU/L
TSH SERPL DL<=0.05 MIU/L-ACNC: 0.62 UIU/ML (ref 0.4–4.2)
UROBILINOGEN, URINE: 0.2 EU/DL (ref 0–1)
VITAMIN D 25-HYDROXY: 38 NG/ML (ref 30–100)
WBC # BLD: 10.1 10^3/ML
WBC # BLD: 12.3 10^3/ML
WBC # BLD: 7.8 10^3/ML
WBC # BLD: 8.2 THOU/MM3 (ref 4.8–10.8)
WBC # BLD: 8.6 THOU/MM3 (ref 4.8–10.8)
WBC # BLD: 9.3 10^3/ML
WBC # BLD: 9.6 THOU/MM3 (ref 4.8–10.8)
WBC # BLD: 9.7 10^3/ML
WBC UA: > 100 /HPF
YEAST: ABNORMAL

## 2022-01-01 PROCEDURE — 2580000003 HC RX 258: Performed by: INTERNAL MEDICINE

## 2022-01-01 PROCEDURE — 99204 OFFICE O/P NEW MOD 45 MIN: CPT | Performed by: INTERNAL MEDICINE

## 2022-01-01 PROCEDURE — 99214 OFFICE O/P EST MOD 30 MIN: CPT | Performed by: FAMILY MEDICINE

## 2022-01-01 PROCEDURE — 82044 UR ALBUMIN SEMIQUANTITATIVE: CPT | Performed by: FAMILY MEDICINE

## 2022-01-01 PROCEDURE — 49418 INSERT TUN IP CATH PERC: CPT

## 2022-01-01 PROCEDURE — C1729 CATH, DRAINAGE: HCPCS

## 2022-01-01 PROCEDURE — 2580000003 HC RX 258: Performed by: RADIOLOGY

## 2022-01-01 PROCEDURE — 2500000003 HC RX 250 WO HCPCS: Performed by: RADIOLOGY

## 2022-01-01 PROCEDURE — 71046 X-RAY EXAM CHEST 2 VIEWS: CPT

## 2022-01-01 PROCEDURE — 85027 COMPLETE CBC AUTOMATED: CPT

## 2022-01-01 PROCEDURE — 6360000002 HC RX W HCPCS: Performed by: RADIOLOGY

## 2022-01-01 PROCEDURE — 99214 OFFICE O/P EST MOD 30 MIN: CPT | Performed by: INTERNAL MEDICINE

## 2022-01-01 PROCEDURE — 96360 HYDRATION IV INFUSION INIT: CPT

## 2022-01-01 PROCEDURE — 96361 HYDRATE IV INFUSION ADD-ON: CPT

## 2022-01-01 PROCEDURE — 3052F HG A1C>EQUAL 8.0%<EQUAL 9.0%: CPT | Performed by: FAMILY MEDICINE

## 2022-01-01 RX ORDER — MIDAZOLAM HYDROCHLORIDE 1 MG/ML
INJECTION INTRAMUSCULAR; INTRAVENOUS
Status: COMPLETED | OUTPATIENT
Start: 2022-01-01 | End: 2022-01-01

## 2022-01-01 RX ORDER — FENTANYL CITRATE 50 UG/ML
INJECTION, SOLUTION INTRAMUSCULAR; INTRAVENOUS
Status: COMPLETED | OUTPATIENT
Start: 2022-01-01 | End: 2022-01-01

## 2022-01-01 RX ORDER — TRAZODONE HYDROCHLORIDE 50 MG/1
TABLET ORAL
Qty: 270 TABLET | Refills: 3 | Status: SHIPPED | OUTPATIENT
Start: 2022-01-01

## 2022-01-01 RX ORDER — PREDNISONE 10 MG/1
30 TABLET ORAL 2 TIMES DAILY
Qty: 180 TABLET | Refills: 0 | Status: SHIPPED | OUTPATIENT
Start: 2022-01-01 | End: 2022-01-01

## 2022-01-01 RX ORDER — MIDODRINE HYDROCHLORIDE 10 MG/1
10 TABLET ORAL 3 TIMES DAILY PRN
Qty: 60 TABLET | Refills: 1 | Status: SHIPPED | OUTPATIENT
Start: 2022-01-01

## 2022-01-01 RX ORDER — 0.9 % SODIUM CHLORIDE 0.9 %
1000 INTRAVENOUS SOLUTION INTRAVENOUS ONCE
Status: COMPLETED | OUTPATIENT
Start: 2022-01-01 | End: 2022-01-01

## 2022-01-01 RX ORDER — SODIUM CHLORIDE 450 MG/100ML
INJECTION, SOLUTION INTRAVENOUS CONTINUOUS
Status: DISCONTINUED | OUTPATIENT
Start: 2022-01-01 | End: 2022-01-01 | Stop reason: HOSPADM

## 2022-01-01 RX ORDER — PATIROMER 8.4 G/1
POWDER, FOR SUSPENSION ORAL
Qty: 30 EACH | Refills: 3 | Status: SHIPPED | OUTPATIENT
Start: 2022-01-01

## 2022-01-01 RX ORDER — GLUCOSAMINE HCL/CHONDROITIN SU 500-400 MG
CAPSULE ORAL
Qty: 100 STRIP | Refills: 3 | Status: SHIPPED | OUTPATIENT
Start: 2022-01-01

## 2022-01-01 RX ORDER — PROPRANOLOL HYDROCHLORIDE 80 MG/1
80 CAPSULE, EXTENDED RELEASE ORAL DAILY
Qty: 30 CAPSULE | Refills: 5 | Status: SHIPPED | OUTPATIENT
Start: 2022-01-01 | End: 2022-01-01 | Stop reason: ALTCHOICE

## 2022-01-01 RX ORDER — GLIMEPIRIDE 4 MG/1
4 TABLET ORAL
Qty: 180 TABLET | Refills: 3 | Status: SHIPPED | OUTPATIENT
Start: 2022-01-01

## 2022-01-01 RX ORDER — CITALOPRAM 20 MG/1
TABLET ORAL
Qty: 90 TABLET | Refills: 3 | Status: SHIPPED | OUTPATIENT
Start: 2022-01-01

## 2022-01-01 RX ORDER — PRAVASTATIN SODIUM 40 MG
40 TABLET ORAL DAILY
Qty: 90 TABLET | Refills: 2 | Status: SHIPPED | OUTPATIENT
Start: 2022-01-01 | End: 2022-01-01 | Stop reason: SDUPTHER

## 2022-01-01 RX ORDER — CLINDAMYCIN PHOSPHATE 600 MG/50ML
600 INJECTION INTRAVENOUS
Status: COMPLETED | OUTPATIENT
Start: 2022-01-01 | End: 2022-01-01

## 2022-01-01 RX ORDER — HYDROCODONE BITARTRATE AND ACETAMINOPHEN 5; 325 MG/1; MG/1
1 TABLET ORAL EVERY 6 HOURS PRN
COMMUNITY

## 2022-01-01 RX ORDER — PRAVASTATIN SODIUM 40 MG
40 TABLET ORAL DAILY
Qty: 90 TABLET | Refills: 3 | OUTPATIENT
Start: 2022-01-01

## 2022-01-01 RX ORDER — FENTANYL CITRATE 50 UG/ML
50 INJECTION, SOLUTION INTRAMUSCULAR; INTRAVENOUS ONCE
Status: DISCONTINUED | OUTPATIENT
Start: 2022-01-01 | End: 2022-01-01 | Stop reason: HOSPADM

## 2022-01-01 RX ORDER — MIDAZOLAM HYDROCHLORIDE 1 MG/ML
1 INJECTION INTRAMUSCULAR; INTRAVENOUS ONCE
Status: DISCONTINUED | OUTPATIENT
Start: 2022-01-01 | End: 2022-01-01 | Stop reason: HOSPADM

## 2022-01-01 RX ORDER — PRAVASTATIN SODIUM 40 MG
40 TABLET ORAL DAILY
Qty: 90 TABLET | Refills: 3 | Status: SHIPPED | OUTPATIENT
Start: 2022-01-01 | End: 2022-01-01

## 2022-01-01 RX ADMIN — SODIUM CHLORIDE 1000 ML: 9 INJECTION, SOLUTION INTRAVENOUS at 10:13

## 2022-01-01 RX ADMIN — MIDAZOLAM 1 MG: 1 INJECTION INTRAMUSCULAR; INTRAVENOUS at 13:31

## 2022-01-01 RX ADMIN — FENTANYL CITRATE 50 MCG: 50 INJECTION, SOLUTION INTRAMUSCULAR; INTRAVENOUS at 13:36

## 2022-01-01 RX ADMIN — SODIUM CHLORIDE 1000 ML: 9 INJECTION, SOLUTION INTRAVENOUS at 08:15

## 2022-01-01 RX ADMIN — SODIUM CHLORIDE: 4.5 INJECTION, SOLUTION INTRAVENOUS at 12:10

## 2022-01-01 RX ADMIN — CLINDAMYCIN PHOSPHATE 600 MG: 600 INJECTION, SOLUTION INTRAVENOUS at 12:10

## 2022-01-01 RX ADMIN — FENTANYL CITRATE 50 MCG: 50 INJECTION, SOLUTION INTRAMUSCULAR; INTRAVENOUS at 13:31

## 2022-01-01 RX ADMIN — MIDAZOLAM 1 MG: 1 INJECTION INTRAMUSCULAR; INTRAVENOUS at 13:36

## 2022-01-01 SDOH — ECONOMIC STABILITY: FOOD INSECURITY: WITHIN THE PAST 12 MONTHS, THE FOOD YOU BOUGHT JUST DIDN'T LAST AND YOU DIDN'T HAVE MONEY TO GET MORE.: NEVER TRUE

## 2022-01-01 SDOH — ECONOMIC STABILITY: FOOD INSECURITY: WITHIN THE PAST 12 MONTHS, YOU WORRIED THAT YOUR FOOD WOULD RUN OUT BEFORE YOU GOT MONEY TO BUY MORE.: NEVER TRUE

## 2022-01-01 ASSESSMENT — PAIN SCALES - GENERAL
PAINLEVEL_OUTOF10: 0
PAINLEVEL_OUTOF10: 5

## 2022-01-01 ASSESSMENT — ENCOUNTER SYMPTOMS
COUGH: 0
RHINORRHEA: 0
BLOOD IN STOOL: 0
SHORTNESS OF BREATH: 0
SORE THROAT: 0
EYE PAIN: 0
BACK PAIN: 0
RHINORRHEA: 0
VOMITING: 0
SORE THROAT: 0
CONSTIPATION: 0
EYE PAIN: 0
VOMITING: 0
SHORTNESS OF BREATH: 1
WHEEZING: 0
DIARRHEA: 0
ABDOMINAL PAIN: 1
CHEST TIGHTNESS: 0
DIARRHEA: 0
CONSTIPATION: 0
WHEEZING: 0
NAUSEA: 0
ABDOMINAL PAIN: 0
CHEST TIGHTNESS: 0
ABDOMINAL DISTENTION: 1
BLOOD IN STOOL: 0
BACK PAIN: 0
COUGH: 0
NAUSEA: 0

## 2022-01-01 ASSESSMENT — PATIENT HEALTH QUESTIONNAIRE - PHQ9
10. IF YOU CHECKED OFF ANY PROBLEMS, HOW DIFFICULT HAVE THESE PROBLEMS MADE IT FOR YOU TO DO YOUR WORK, TAKE CARE OF THINGS AT HOME, OR GET ALONG WITH OTHER PEOPLE: 1
3. TROUBLE FALLING OR STAYING ASLEEP: 3
2. FEELING DOWN, DEPRESSED OR HOPELESS: 0
SUM OF ALL RESPONSES TO PHQ QUESTIONS 1-9: 5
7. TROUBLE CONCENTRATING ON THINGS, SUCH AS READING THE NEWSPAPER OR WATCHING TELEVISION: 0
SUM OF ALL RESPONSES TO PHQ QUESTIONS 1-9: 5
SUM OF ALL RESPONSES TO PHQ9 QUESTIONS 1 & 2: 1
9. THOUGHTS THAT YOU WOULD BE BETTER OFF DEAD, OR OF HURTING YOURSELF: 0
8. MOVING OR SPEAKING SO SLOWLY THAT OTHER PEOPLE COULD HAVE NOTICED. OR THE OPPOSITE, BEING SO FIGETY OR RESTLESS THAT YOU HAVE BEEN MOVING AROUND A LOT MORE THAN USUAL: 0
6. FEELING BAD ABOUT YOURSELF - OR THAT YOU ARE A FAILURE OR HAVE LET YOURSELF OR YOUR FAMILY DOWN: 0
1. LITTLE INTEREST OR PLEASURE IN DOING THINGS: 1
SUM OF ALL RESPONSES TO PHQ QUESTIONS 1-9: 5
SUM OF ALL RESPONSES TO PHQ QUESTIONS 1-9: 5
5. POOR APPETITE OR OVEREATING: 0
4. FEELING TIRED OR HAVING LITTLE ENERGY: 1

## 2022-01-01 ASSESSMENT — PAIN DESCRIPTION - ORIENTATION: ORIENTATION: LOWER

## 2022-01-01 ASSESSMENT — PAIN DESCRIPTION - DESCRIPTORS: DESCRIPTORS: ACHING

## 2022-01-01 ASSESSMENT — PAIN DESCRIPTION - PAIN TYPE: TYPE: CHRONIC PAIN

## 2022-01-01 ASSESSMENT — SOCIAL DETERMINANTS OF HEALTH (SDOH): HOW HARD IS IT FOR YOU TO PAY FOR THE VERY BASICS LIKE FOOD, HOUSING, MEDICAL CARE, AND HEATING?: NOT HARD AT ALL

## 2022-01-01 ASSESSMENT — PAIN DESCRIPTION - LOCATION: LOCATION: BACK

## 2022-01-10 NOTE — TELEPHONE ENCOUNTER
Priti SANCHEZ Short needs refill of   Requested Prescriptions     Pending Prescriptions Disp Refills    pravastatin (PRAVACHOL) 40 MG tablet 90 tablet 3     Sig: Take 1 tablet by mouth daily     Refused Prescriptions Disp Refills    pravastatin (PRAVACHOL) 40 MG tablet [Pharmacy Med Name: Pravastatin Sodium 40 MG Oral Tablet] 90 tablet 3     Sig: TAKE 1 TABLET BY MOUTH  DAILY     Refused By: Grady Espino     Reason for Refusal: Patient should contact provider first       Last Filled on:  10/26/2020    Last Visit Date:  11/1/2021    Next Visit Date:    Visit date not found    Pt is requesting refill

## 2022-05-17 NOTE — PROGRESS NOTES
[address] [facility]  [address]  Dept: [phone]  Loc: [phone]  Outpatient Consult  [phone]  5/17/2022 11:38 AM EDT        Pt Name:    Kathy Kincaid  Birthdate:    [birth year]  Primary Care Physician:  Layla Brambila MD     Chief Complaint:   Chief Complaint   Patient presents with   Guille Omero Patient     Ref Dr. Chapo Julio increase creat         Background Information/Interval History:   The patient is a pleasant 61y.o. year old female referred by Dr. Chapo Julio for elevated creatinine. She has history of renal cell Ca. She initially was found to have large B/L renal masses diagnosed May 2021, had biopsy in July consistent with B/L clear cell renal carcinoma. She underwent partial left nephrectomy in September 2021 and subsequently right radical nephrectomy December 2021. She was on Inlyta in July -August but had a CVA so it was stopped. She has been on [address] since February. creatinine surgery has been around 2.5-2.6 and went up to 3.2 in April thus was referred to nephrology. She feels ok. Denies any NSAID use. BP controlled. On Metformin. No change in urination. She has not received results PET scan yet but recently showed hepatic masses and right renal bed soft tissue density concerning for neoplasm. Allergies:  Iodine and Sulfa antibiotics        Past Medical History:  Past Medical History:   Diagnosis Date    Allergic rhinitis     CKD stage 3 due to type 2 diabetes mellitus ([address]     Depression     DM type 2 causing CKD stage 3 ([address]     onset 2013.  Headache(784.0)     Hypertension     Multinodular goiter     Persistent proteinuria associated with type 2 diabetes mellitus ([address] 10/2016    urine micral of 20.       Pure hypercholesterolemia         Past Surgical History:  Past Surgical History:   Procedure Laterality Date    DILATION AND CURETTAGE OF Connections:     Frequency of Communication with Friends and Family: Not on file    Frequency of Social Gatherings with Friends and Family: Not on file    Attends Sikhism Services: Not on file    Active Member of Clubs or Organizations: Not on file    Attends Club or Organization Meetings: Not on file    Marital Status: Not on file   Intimate Partner Violence:     Fear of Current or Ex-Partner: Not on file    Emotionally Abused: Not on file    Physically Abused: Not on file    Sexually Abused: Not on file   Housing Stability:     Unable to Pay for Housing in the Last Year: Not on file    Number of [place] in the Last Year: Not on file    Unstable Housing in the Last Year: Not on file        Review of Systems:  Constitutional: no fever or chills  Head: No headaches  Eyes: no blurry vision, no discharge  Ears: no ear pain or hearing changes  Nose: no runny nose or epistaxis  Respiratory: no shortness of breath or cough or sputum production  Cardiovascular: no chest pain, no edema  GI: no nausea, no vomiting or diarrhea  : denies any hematuria, no flank pain  Skin: no rash  Musculoskeletal: low back pain  Neuro: no tremor, no slurred speech  Psychiatric: stable mood, no depression or insomnia     Home Medications:  Prior to Admission medications    Medication Sig Start Date End Date Taking?  Authorizing Provider   pravastatin (PRAVACHOL) 40 MG tablet Take 1 tablet by mouth daily 1/10/22  Yes Yeny Cartwright MD   glimepiride (AMARYL) 4 MG tablet Take 1 tablet by mouth 2 times daily (before meals) 11/1/21  Yes Yeny Cartwright MD   citalopram (CELEXA) 20 MG tablet TAKE 1 TABLET DAILY 11/1/21  Yes Yeny Cartwright MD   propranolol (INDERAL LA) 160 MG extended release capsule TAKE 1 CAPSULE DAILY 11/1/21  Yes Yeny Cartwright MD   metFORMIN (GLUCOPHAGE) 500 MG tablet Take 1 tablet by mouth 2 times daily (with meals) 11/1/21  Yes Yeny Cartwright MD   traZODone (DESYREL) 50 MG tablet TAKE 2 TO 3 TABLETS BY  MOUTH AT NIGHT FOR SLEEP 11/1/21  Yes Rex Chand MD   ondansetron (ZOFRAN) 4 MG tablet Take 1 tablet by mouth 3 times daily as needed for Nausea or Vomiting 9/28/21  Yes Hao Sanchez MD   sennosides-docusate sodium (SENOKOT-S) 8.6-50 MG tablet Take 2 tablets by mouth daily 9/27/21  Yes Hao Sanchez MD   aspirin 81 MG EC tablet Take 81 mg by mouth daily   Yes Historical Provider, MD   CPAP Machine MISC by Does not apply route Please change her current Auto CPAP pressure settings I.e from Minimum CPAP of 6cm H20 and Maximum CPAP of 20cm H20 settings to a fixed CPAP pressure of 11cm H20 with EPR of 2. 11/4/20  Yes Britt Jackson MD   fluticasone-salmeterol (ADVAIR DISKUS) 250-50 MCG/DOSE AEPB Inhale 1 puff into the lungs every 12 hours 10/26/20  Yes Rex Chand MD   cetirizine (ZYRTEC) 10 MG tablet Take 10 mg by mouth daily. Yes Historical Provider, MD   Fiber Adult Gummies 2 g CHEW Take 2 tablets by mouth daily  Patient not taking: Reported on 5/17/2022 9/27/21 9/27/22  Hao Sanchez MD   axitinib (INLYTA) 5 MG tablet Take by mouth  Patient not taking: Reported on 5/17/2022    Historical Provider, MD   albuterol sulfate  (90 Base) MCG/ACT inhaler Inhale 2 puffs into the lungs every 6 hours as needed for Wheezing  Patient not taking: Reported on 5/17/2022 10/26/20   Rex Chand MD        Physical Examination:  VITALS:  /62 (Site: Left Upper Arm, Position: Sitting, Cuff Size: Large Adult)   Pulse 66   Temp 97.2 °F (36.2 °C) (Temporal)   Ht 5' 6\" (1.676 m)   Wt 238 lb (108 kg)   LMP 12/29/2011   SpO2 98%   BMI 38.41 kg/m²   Body mass index is 38.41 kg/m².   Wt Readings from Last 3 Encounters:   05/17/22 238 lb (108 kg)   11/01/21 233 lb 6.4 oz (105.9 kg)   09/27/21 250 lb (113.4 kg)     Constitutional and General Appearance: alert and cooperative with exam, appears comfortable, no distress  Eyes: no icteric sclera, no pallor conjunctiva, no discharge seen from either eye  Ears and Nose: normal external appearance of left and right ear and nose. No active drainage from nose.    Oral: moist oral mucus membranes  Neck: No jugular venous distention, appears symmetric, good ROM  Lungs: Air entry B/L, no crackles or rales, no use of accessory muscles  Heart: regular rate, S1, S2  Extremities: nonpitting ankle edema  GI: soft, non-tender, no guarding  Skin: no rash seen on exposed extremities  Musculo: moves all extremities  Neuro: no slurred speech, no facial drooping, no asterixis  Psychiatric: Awake, alert, Oriented     Laboratory & Diagnostics:  CBC:   Lab Results   Component Value Date    WBC 9.7 04/25/2022    HGB 11.1 (A) 04/25/2022    HCT 34.1 (A) 04/25/2022    MCV 86.8 10/15/2021     04/25/2022     BMP:    Lab Results   Component Value Date     04/25/2022     03/22/2022     01/10/2022    K 4.7 04/25/2022    K 4.5 03/22/2022    K 4.8 01/10/2022     04/25/2022     03/22/2022     01/10/2022    CO2 26 04/25/2022    CO2 28 03/22/2022    CO2 25 01/10/2022    BUN 39 04/25/2022    BUN 49 03/22/2022    BUN 39 (H) 01/10/2022    CREATININE 3.2 04/25/2022    CREATININE 2.6 03/22/2022    CREATININE 2.6 (H) 01/10/2022    GLUCOSE 329 04/25/2022    GLUCOSE 156 03/22/2022    GLUCOSE 214 (H) 01/10/2022      Hepatic:   Lab Results   Component Value Date    AST 14 03/22/2022    AST 8 (L) 10/15/2021    AST 34 09/27/2021    ALT 16 03/22/2022    ALT 4 (L) 10/15/2021    ALT 22 09/27/2021    BILITOT 0.2 03/22/2022    BILITOT 0.5 10/15/2021    BILITOT 2.9 (H) 09/27/2021    ALKPHOS 123 03/22/2022    ALKPHOS 101 10/15/2021    ALKPHOS 227 (H) 09/27/2021     BNP: No results found for: BNP  Lipids:   Lab Results   Component Value Date    CHOL 188 11/01/2021    HDL 42 11/01/2021     INR: No results found for: INR  URINE:   Lab Results   Component Value Date    PROTUR Trace 06/08/2021     Lab Results   Component Value Date

## 2022-05-18 NOTE — TELEPHONE ENCOUNTER
----- Message from Claus Dooley DO sent at 5/17/2022  4:51 PM EDT -----  Please inform patient I reviewed her urine tests and it does look like she is probably having a reaction to the [facility]. Most likely I will be starting her on steroids but I will see what her labs with Dr. Cristin Carrillo look like tomorrow.

## 2022-05-19 NOTE — TELEPHONE ENCOUNTER
----- Message from Yimi Desir DO sent at 5/19/2022  8:59 AM EDT -----  Start Prednisone 30 mg BID. Repeat a bmp as well as UA and urine eosinophils in 2 weeks.

## 2022-05-24 NOTE — TELEPHONE ENCOUNTER
----- Message from Brigid Georges DO sent at 5/24/2022  3:02 PM EDT -----  Stay off Metformin. Reduce prednisone to 20 mg BID. Bmp 2 weeks.    Is she still getting the [facility] ([place])?  ----- Message -----  From: Tamela Chris CMA  Sent: 5/24/2022   1:54 PM EDT  To: Brigid Georges DO

## 2022-05-24 NOTE — TELEPHONE ENCOUNTER
Patient called into the office. Had labs drawn yesterday at Dr Marquis Paul and also on 5/18. There was also a PET scan completed and hse was told that the mass is now in her liver. Wanted to know if you want her to continue the prednisone 30 mg bid and to continue to hold the Metformin. She had stopped taking the metformin at last appointment. Please advise.

## 2022-05-24 NOTE — TELEPHONE ENCOUNTER
Patient has been informed and MAR is updated. She is not getting the [facility] ([facility]).

## 2022-06-02 NOTE — TELEPHONE ENCOUNTER
----- Message from Bobby Maurer DO sent at 2022  2:03 PM EDT -----  Labs look better. Reduce prednisone to 10 mg bid x 3 days, 10 mg daily x 3 days, 5 mg daily x 3 days then can stop.      Still will need repeat labs prior to appointment with me.   ----- Message -----  From: Kirk Melton Incoming Lab Results From Soft  Sent: 2022  12:14 PM EDT  To: Bobby Maurer DO

## 2022-06-17 NOTE — TELEPHONE ENCOUNTER
----- Message from Clemente Fox DO sent at 6/17/2022 10:12 AM EDT -----  Potassium is borderline elevated, discuss low K diet.    Repeat another bmp next week prior to appointment  ----- Message -----  From: Gianna Kemp Incoming Lab Results From Soft  Sent: 6/16/2022   6:43 PM EDT  To: Clemente Fox DO

## 2022-06-17 NOTE — TELEPHONE ENCOUNTER
Attempted to contact patient. Received VM. Left vm and asked for a return call to the office to review foods high in potassium. Also informed that blood work would need done next week again prior to appt. Also attempted to reach patient on mobile number, with no answer/vm available.

## 2022-06-21 NOTE — PROGRESS NOTES
[address] [facility]  [address]  [facility] [address]  Dept: [phone]  Loc: [phone]  Progress Note  6/21/2022 12:28 PM      Pt Name:    Lucía Mitchell  Birthdate:    [birth year]  Primary Care Physician:  Jeff Peterson MD     Chief Complaint:   Chief Complaint   Patient presents with    Follow-up     DOMINGUEZ         History of Present Illness: This is a follow-up visit for DOMINGUEZ on CKD IV d/t AIN likely from [place]. She has history of metastatic renal cell Ca. She initially was found to have large B/L renal masses diagnosed May 2021, had biopsy in July consistent with B/L clear cell renal carcinoma. She underwent partial left nephrectomy in September 2021 and subsequently right radical nephrectomy December 2021. She was on Inlyta in July -August but had a CVA so it was stopped. had been on Keytruda since Feb but stopped when creat jumped up to 3.2. We started her on prednisone she was on for about 3 weeks, creatinine returned to baseline. K has been high she has been eating a lot of bananas. Sugars running higher. Had liver biopsy showing metastatic disease, sees Dr. Sophie Mccormick tomorrow to discuss plan. Pertinent items are noted in HPI. Past History:  Past Medical History:   Diagnosis Date    Allergic rhinitis     CKD stage 3 due to type 2 diabetes mellitus ([address]     Depression     DM type 2 causing CKD stage 3 ([address]     onset 2013.  Headache(784.0)     Hypertension     Multinodular goiter     Persistent proteinuria associated with type 2 diabetes mellitus ([address] 10/2016    urine micral of 20.       Pure hypercholesterolemia      Past Surgical History:   Procedure Laterality Date    DILATION AND CURETTAGE OF UTERUS  2016    x2    US GUIDED LIVER BIOPSY PERCUTANEOUS  6/1/2022    US GUIDED LIVER BIOPSY PERCUTANEOUS        VITALS:  BP 98/60 (Site: Right Upper Arm, Position: Sitting, Cuff Size: Large Adult)   Pulse 76   Wt 232 lb (105.2 kg)   LMP 12/29/2011   SpO2 98%   BMI 37.45 kg/m²   Wt Readings from Last 3 Encounters:   06/21/22 232 lb (105.2 kg)   05/17/22 238 lb (108 kg)   11/01/21 233 lb 6.4 oz (105.9 kg)     Body mass index is 37.45 kg/m². General Appearance: alert and cooperative with exam, appears comfortable, no distress  HEENT: EOMI, moist oral mucus membranes  Neck: No jugular venous distention,  Lungs: Air entry B/L, no crackles or rales, no use of accessory muscles  Heart: S1, S2 heard, no rub  GI: soft, non-tender, no guarding,   Extremities: No sig LE edema, no cyanosis  Skin: warm, dry  Neurologic: no tremor, no asterixis,      Medications:  Current Outpatient Medications   Medication Sig Dispense Refill    pravastatin (PRAVACHOL) 40 MG tablet Take 1 tablet by mouth daily 90 tablet 2    glimepiride (AMARYL) 4 MG tablet Take 1 tablet by mouth 2 times daily (before meals) 180 tablet 3    citalopram (CELEXA) 20 MG tablet TAKE 1 TABLET DAILY 90 tablet 3    propranolol (INDERAL LA) 160 MG extended release capsule TAKE 1 CAPSULE DAILY 90 capsule 3    traZODone (DESYREL) 50 MG tablet TAKE 2 TO 3 TABLETS BY  MOUTH AT NIGHT FOR SLEEP 270 tablet 3    ondansetron (ZOFRAN) 4 MG tablet Take 1 tablet by mouth 3 times daily as needed for Nausea or Vomiting 10 tablet 0    sennosides-docusate sodium (SENOKOT-S) 8.6-50 MG tablet Take 2 tablets by mouth daily 20 tablet 0    aspirin 81 MG EC tablet Take 81 mg by mouth daily      CPAP Machine MISC by Does not apply route Please change her current Auto CPAP pressure settings I.e from Minimum CPAP of 6cm H20 and Maximum CPAP of 20cm H20 settings to a fixed CPAP pressure of 11cm H20 with EPR of 2. 1 each 0    fluticasone-salmeterol (ADVAIR DISKUS) 250-50 MCG/DOSE AEPB Inhale 1 puff into the lungs every 12 hours 180 each 3    cetirizine (ZYRTEC) 10 MG tablet Take 10 mg by mouth daily.         Fiber Adult Gummies 2 g CHEW Take 2 tablets by mouth daily (Patient not taking: Reported on 5/17/2022) 60 tablet 5    albuterol sulfate  (90 Base) MCG/ACT inhaler Inhale 2 puffs into the lungs every 6 hours as needed for Wheezing (Patient not taking: Reported on 5/17/2022) 3 Inhaler 3     No current facility-administered medications for this visit.         Laboratory & Diagnostics:  CBC:   Lab Results   Component Value Date    WBC 8.2 06/16/2022    HGB 11.3 (L) 06/16/2022    HCT 36.1 (L) 06/16/2022    MCV 83.6 06/16/2022     06/16/2022     BMP:    Lab Results   Component Value Date     (L) 06/16/2022     06/01/2022     05/23/2022    K 5.5 (H) 06/16/2022    K 4.9 06/01/2022    K 4.6 05/23/2022    CL 98 06/16/2022     06/01/2022     05/23/2022    CO2 21 (L) 06/16/2022    CO2 17 (L) 06/01/2022    CO2 22 05/23/2022    BUN 46 (H) 06/16/2022    BUN 67 (H) 06/01/2022    BUN 62 05/23/2022    CREATININE 2.7 (H) 06/16/2022    CREATININE 2.4 (H) 06/01/2022    CREATININE 2.8 05/23/2022    GLUCOSE 295 (H) 06/16/2022    GLUCOSE 278 (H) 06/01/2022    GLUCOSE 390 05/23/2022      Hepatic:   Lab Results   Component Value Date    AST 15 05/18/2022    AST 14 03/22/2022    AST 8 (L) 10/15/2021    ALT 9 05/18/2022    ALT 16 03/22/2022    ALT 4 (L) 10/15/2021    BILITOT 0.3 05/18/2022    BILITOT 0.2 03/22/2022    BILITOT 0.5 10/15/2021    ALKPHOS 168 05/18/2022    ALKPHOS 123 03/22/2022    ALKPHOS 101 10/15/2021     BNP: No results found for: BNP  Lipids:   Lab Results   Component Value Date    CHOL 188 11/01/2021    HDL 42 11/01/2021     INR:   Lab Results   Component Value Date    INR 0.91 06/01/2022     URINE:   Lab Results   Component Value Date    PROTUR Trace 06/08/2021     Lab Results   Component Value Date    NITRU NEGATIVE 06/01/2022    COLORU YELLOW 06/01/2022    PHUR 5.5 06/01/2022    LABCAST 8-15 HYALINE 06/01/2022    LABCAST NONE SEEN 06/01/2022    WBCUA > 100 06/01/2022    WBCUA 6-10 09/29/2016    RBCUA 5-10 06/01/2022

## 2022-06-30 NOTE — TELEPHONE ENCOUNTER
The patient is requesting strips and lancets for her Contour Next One. She tests once daily. She has an appt for diabetic F/U scheduled 7/11/22.

## 2022-07-11 PROBLEM — N28.89 RIGHT RENAL MASS: Status: ACTIVE | Noted: 2021-01-01

## 2022-07-11 NOTE — PATIENT INSTRUCTIONS
time, diabetes starts to cause symptoms. You have most symptoms of type 2 diabeteswhen your blood sugar is either too high or too low. The most common symptoms of high blood sugar include:   Thirst.   Needing to urinate often.  Weight loss.  Blurry vision. The symptoms of low blood sugar include:   Sweating.  Shakiness.  Weakness.  Hunger.  Confusion. You're not likely to get symptoms of low blood sugar unless you take insulin oruse certain diabetes medicines that lower blood sugar. How can you help prevent type 2 diabetes? There are things you can do to help prevent type 2 diabetes. Stay at a healthy weight. Exercise regularly, and eat healthy foods. Even small changes can make a difference. If you have prediabetes, the medicine metformin can help preventtype 2 diabetes. How is type 2 diabetes treated? Treatment for type 2 diabetes will change over time to meet your needs. But the focus of your treatment will usually be to keep your blood sugar levels in your target range. This will help prevent problems such as eye, kidney, heart, bloodvessel, and nerve disease. Some people may need medicines to help their bodies make insulin or decrease insulin resistance. Some medicines slow down how quickly the body absorbscarbohydrates. Treatment to manage type 2 diabetes includes:   Making healthy food choices and being active.  Losing weight, if you need to.  Seeing your doctor regularly.  Keeping your blood sugar in your target range.  Taking medicines, if you need them.  Quitting smoking, if you smoke.  Keeping your blood pressure and cholesterol under control. Follow-up care is a key part of your treatment and safety. Be sure to make and go to all appointments, and call your doctor if you are having problems. It's also a good idea to know your test results and keep alist of the medicines you take. Where can you learn more? Go to [email or web address] org and sign in to your [employer] account. Enter O261 in the [facility] box to learn more about \"Learning About Type 2 Diabetes. \"     If you do not have an account, please click on the \"Sign Up Now\" link. Current as of: July 28, 2021               Content Version: 13.3  © 9101-6507 Healthwise, Incorporated. Care instructions adapted under license by [facility] ([facility]). If you have questions about a medical condition or this instruction, always ask your healthcare professional. [address] any warranty or liability for your use of this information.

## 2022-07-11 NOTE — PROGRESS NOTES
Hines Patience (:  1955) is a 61 y.o. female,Established patient, here for evaluation of the following chief complaint(s):  6 Month Follow-Up (refills)         ASSESSMENT/PLAN:  1. Type 2 diabetes mellitus with stage 3a chronic kidney disease, without long-term current use of insulin (HCC)  -     glimepiride (AMARYL) 4 MG tablet; Take 1 tablet by mouth 2 times daily (before meals), Disp-180 tablet, R-3Normal  -     Hemoglobin A1C; Future  -     POCT microalbumin  -     Lipid Panel; Future  -stable, controlled on amaryl  2. Renal cell carcinoma of right kidney (HCC)  -chronic condition, exacerbated, metastatic, no further treatment, will need pain control in the future. 3. Hypercholesterolemia  -     pravastatin (PRAVACHOL) 40 MG tablet; Take 1 tablet by mouth daily, Disp-90 tablet, R-3Normal  -     Lipid Panel; Future  -stable, controlled on pravastatin  4. Primary insomnia  -     traZODone (DESYREL) 50 MG tablet; TAKE 2 TO 3 TABLETS BY  MOUTH AT NIGHT FOR SLEEP, Disp-270 tablet, R-3Requesting 1 year supply Normal  -stable, controlled on trazodone  5. Depression, unspecified depression type  -     citalopram (CELEXA) 20 MG tablet; TAKE 1 TABLET DAILY, Disp-90 tablet, R-3Normal  -     CBC with Auto Differential; Future  -     Comprehensive Metabolic Panel;  Future  -stable, controlled on citalopram  Orders Placed This Encounter   Procedures    Hemoglobin A1C     Standing Status:   Future     Number of Occurrences:   1     Standing Expiration Date:   2023    CBC with Auto Differential     Standing Status:   Future     Number of Occurrences:   1     Standing Expiration Date:   2023    Comprehensive Metabolic Panel     Standing Status:   Future     Number of Occurrences:   1     Standing Expiration Date:   2023    Lipid Panel     Standing Status:   Future     Number of Occurrences:   1     Standing Expiration Date:   2023     Order Specific Question:   Is Patient Fasting?/# of Hours Answer:   15    POCT microalbumin     Reviewed health maintenance        No follow-ups on file. Subjective   SUBJECTIVE/OBJECTIVE:  HPI   Patient here today for a check up. Reviewed BMI of 38. Encouraged diet, exercise and weight loss. Here for refills and a1c. No longer doing treatment for metastatic kidney cancer. Having pain controlled with tylenol. Former smoker, pmh reviewed, . Review of Systems   Constitutional: Negative for chills, fatigue, fever and unexpected weight change. HENT: Negative for congestion, ear pain, rhinorrhea and sore throat. Eyes: Negative for pain and visual disturbance. Respiratory: Negative for cough, chest tightness, shortness of breath and wheezing. Cardiovascular: Negative for chest pain and palpitations. Gastrointestinal: Negative for abdominal pain, blood in stool, constipation, diarrhea, nausea and vomiting. Genitourinary: Positive for flank pain. Negative for difficulty urinating, frequency, hematuria and urgency. Musculoskeletal: Negative for back pain, joint swelling, myalgias and neck pain. Skin: Negative for rash. Neurological: Negative for dizziness and headaches. Hematological: Negative for adenopathy. Does not bruise/bleed easily. Psychiatric/Behavioral: Negative for behavioral problems and sleep disturbance. The patient is not nervous/anxious. Objective   Physical Exam  Vitals and nursing note reviewed. Constitutional:       Appearance: She is well-developed. HENT:      Head: Normocephalic and atraumatic. Right Ear: External ear normal.      Left Ear: External ear normal.      Nose: Nose normal.      Mouth/Throat:      Mouth: Mucous membranes are moist.   Eyes:      Pupils: Pupils are equal, round, and reactive to light. Neck:      Thyroid: No thyromegaly. Vascular: No carotid bruit. Cardiovascular:      Rate and Rhythm: Normal rate and regular rhythm. Heart sounds: Normal heart sounds. Pulmonary:      Breath sounds: Normal breath sounds. No wheezing or rales. Abdominal:      General: Bowel sounds are normal.      Palpations: Abdomen is soft. Tenderness: There is no abdominal tenderness. There is no guarding or rebound. Musculoskeletal:         General: Normal range of motion. Cervical back: Neck supple. Feet:      Comments: No open areas on the feet. Sensation intact. Lymphadenopathy:      Cervical: No cervical adenopathy. Skin:     General: Skin is warm and dry. Findings: No rash. Neurological:      Mental Status: She is alert and oriented to person, place, and time. Cranial Nerves: No cranial nerve deficit. Deep Tendon Reflexes: Reflexes are normal and symmetric. An electronic signature was used to authenticate this note.     --Zeke To MD

## 2022-07-11 NOTE — PROGRESS NOTES
Blood work drawn today in the office, venous puncture, right arm, pt tolerated well.     Pt provided urine sample in office

## 2022-07-12 NOTE — TELEPHONE ENCOUNTER
Pt returned call, Pt informed of results and recommendations, Pt voiced understanding and had no further questions.

## 2022-07-12 NOTE — TELEPHONE ENCOUNTER
Message left for Pt to call the office.   [facility] sent  A1c orders mailed to Pt

## 2022-07-12 NOTE — TELEPHONE ENCOUNTER
----- Message from Kirti Hensley MD sent at 7/12/2022  6:59 AM EDT -----  A1c is higher at 8.5. Continue the amaryl. Recheck a1c in 3 months. Cholesterol at 178. CMP is stable, kidney function is stable. CBC is ok. Continue present.

## 2022-07-28 NOTE — TELEPHONE ENCOUNTER
----- Message from Jorje Pineda DO sent at 7/27/2022  5:22 PM EDT -----  K remains borderline high.    Would like her to either start Veltassa 8.4 grams three times weekly or Lokelma 5 grams three times weekly, whichever may be more affordable  ----- Message -----  From: Perla Ocampo Incoming Lab Results From Soft  Sent: 7/26/2022   6:49 PM EDT  To: Jorje Pineda DO

## 2022-08-02 NOTE — PROGRESS NOTES
CURETTAGE OF UTERUS  2016    x2    US GUIDED LIVER BIOPSY PERCUTANEOUS  6/1/2022    US GUIDED LIVER BIOPSY PERCUTANEOUS        VITALS:  BP (!) 80/50 (Site: Left Upper Arm, Position: Sitting, Cuff Size: Large Adult)   Pulse 80   Wt 238 lb (108 kg)   LMP 12/29/2011   SpO2 98%   BMI 38.41 kg/m²   Wt Readings from Last 3 Encounters:   08/02/22 238 lb (108 kg)   07/11/22 237 lb 12.8 oz (107.9 kg)   06/21/22 232 lb (105.2 kg)     Body mass index is 38.41 kg/m². General Appearance: alert and cooperative with exam, appears comfortable, no distress  HEENT: EOMI, moist oral mucus membranes  Neck: No jugular venous distention,  Lungs: Air entry B/L, no crackles or rales, no use of accessory muscles  Heart: S1, S2 heard, no rub  GI: soft, non-tender,  Extremities: No sig LE edema, no cyanosis  Skin: warm, dry  Neurologic: no tremor, no asterixis,      Medications:  Current Outpatient Medications   Medication Sig Dispense Refill    traZODone (DESYREL) 50 MG tablet TAKE 2 TO 3 TABLETS BY  MOUTH AT NIGHT FOR SLEEP 270 tablet 3    citalopram (CELEXA) 20 MG tablet TAKE 1 TABLET DAILY 90 tablet 3    glimepiride (AMARYL) 4 MG tablet Take 1 tablet by mouth 2 times daily (before meals) 180 tablet 3    pravastatin (PRAVACHOL) 40 MG tablet Take 1 tablet by mouth daily 90 tablet 3    Lancets Misc. MISC Use once daily and as needed for glucose testing. 100 each 3    blood glucose monitor strips Test 1 time a day & as needed for symptoms of irregular blood glucose. Dispense sufficient amount for indicated testing frequency plus additional to accommodate PRN testing needs. Please ensure compatibility with Countour Next One glucometer.  100 strip 3    propranolol (INDERAL LA) 80 MG extended release capsule Take 1 capsule by mouth daily 30 capsule 5    aspirin 81 MG EC tablet Take 81 mg by mouth daily      fluticasone-salmeterol (ADVAIR DISKUS) 250-50 MCG/DOSE AEPB Inhale 1 puff into the lungs every 12 hours 180 each 3    cetirizine (ZYRTEC) 10 MG tablet Take 10 mg by mouth daily. patiromer sorbitex calcium (VELTASSA) 8.4 g PACK packet Take 1 packet 3 times weekly (Patient not taking: Reported on 8/2/2022) 30 each 3     No current facility-administered medications for this visit.         Laboratory & Diagnostics:  CBC:   Lab Results   Component Value Date    WBC 9.6 07/11/2022    HGB 10.2 (L) 07/11/2022    HCT 33.6 (L) 07/11/2022    MCV 85.5 07/11/2022     (H) 07/11/2022     BMP:    Lab Results   Component Value Date     07/26/2022     07/11/2022     06/21/2022    K 5.4 (H) 07/26/2022    K 5.1 07/11/2022    K 5.6 (H) 06/21/2022     07/26/2022    CL 99 07/11/2022     06/21/2022    CO2 22 (L) 07/26/2022    CO2 21 (L) 07/11/2022    CO2 23 06/21/2022    BUN 32 (H) 07/26/2022    BUN 29 (H) 07/11/2022    BUN 35 (H) 06/21/2022    CREATININE 2.6 (H) 07/26/2022    CREATININE 2.5 (H) 07/11/2022    CREATININE 2.4 (H) 06/21/2022    GLUCOSE 164 (H) 07/26/2022    GLUCOSE 185 (H) 07/11/2022    GLUCOSE 235 (H) 06/21/2022      Hepatic:   Lab Results   Component Value Date    AST 9 07/11/2022    AST 15 05/18/2022    AST 14 03/22/2022    ALT 6 (L) 07/11/2022    ALT 9 05/18/2022    ALT 16 03/22/2022    BILITOT 0.2 (L) 07/11/2022    BILITOT 0.3 05/18/2022    BILITOT 0.2 03/22/2022    ALKPHOS 151 (H) 07/11/2022    ALKPHOS 168 05/18/2022    ALKPHOS 123 03/22/2022     BNP: No results found for: BNP  Lipids:   Lab Results   Component Value Date    CHOL 178 07/11/2022    HDL 34 07/11/2022     INR:   Lab Results   Component Value Date    INR 0.91 06/01/2022     URINE:   Lab Results   Component Value Date/Time    PROTUR Trace 06/08/2021 12:38 PM     Lab Results   Component Value Date/Time    NITRU NEGATIVE 06/01/2022 11:29 AM    COLORU YELLOW 06/01/2022 11:29 AM    PHUR 5.5 06/01/2022 11:29 AM    LABCAST 8-15 HYALINE 06/01/2022 11:29 AM    LABCAST NONE SEEN 06/01/2022 11:29 AM    WBCUA > 100 06/01/2022 11:29 AM    WBCUA 6-10 09/29/2016 04:20 PM    RBCUA 5-10 06/01/2022 11:29 AM    MUCUS Present 09/29/2016 04:20 PM    YEAST NONE SEEN 06/01/2022 11:29 AM    BACTERIA NONE SEEN 06/01/2022 11:29 AM    CLARITYU clear 03/29/2018 03:53 PM    SPECGRAV 1.024 06/01/2022 11:29 AM    LEUKOCYTESUR MODERATE 06/01/2022 11:29 AM    LEUKOCYTESUR MODERATE 05/17/2022 12:36 PM    UROBILINOGEN 0.2 06/01/2022 11:29 AM    BILIRUBINUR NEGATIVE 06/01/2022 11:29 AM    BILIRUBINUR neg 03/29/2018 03:53 PM    BLOODU TRACE 06/01/2022 11:29 AM    GLUCOSEU Negative 06/08/2021 12:38 PM    KETUA NEGATIVE 06/01/2022 11:29 AM      Microalbumen/Creatinine ratio:  No components found for: RUCREAT        Impression/Plan:   1. DOMINGUEZ d/t AIN: improved, back to baseline  2. CKD IV from nephron loss  3. Hyperkalemia. Rx Veltassa 8.4 grams 3x/week. Recheck K next week  4.metastatic Renal cell Ca, has elected not go undergo any further treatment  5. S/p right nephrectomy and partial left nephrectomy  6. DM  7. Hypotension: stop propranolol. Will arrange for IV fluids. Will give Midodrine as well to take on PRN basis for hypotension        Bloodwork and medications were reviewed and plan of care discussed with the patient. Return to clinic in 2 months  or sooner if the need arises.       Aj Preciado DO  [facility]

## 2022-08-02 NOTE — PATIENT INSTRUCTIONS
Stop Propranol. Take Midodrine for low blood pressure, take a dose today. IV fluids will arrange tomorrow  If worse go to ED! Frankytassa 3 x/week. Repeat potassium next week.

## 2022-08-03 NOTE — PROGRESS NOTES
0800 Patient arrived to [facility] in wheel chair for hydration infusion  Oriented to room and call light  PT RIGHTS AND RESPONSIBILITIES OFFERED TO PT.     3558 Hydration started and she denies complaints    1013 patient denies complaints at this time    1100 patient denies complaints at this time    __M__ Safety:       (Environmental)  [place] to environment  Ensure ID band is correct and in place/ allergy band as needed  Assess for fall risk  Initiate fall precautions as applicable (fall band, side rails, etc.)  Call light within reach  Bed in low position/ wheels locked    _M___ Pain:       Assess pain level and characteristics  Administer analgesics as ordered  Assess effectiveness of pain management and report to MD as needed    __M__ Knowledge Deficit:  Assess baseline knowledge  Provide teaching at level of understanding  Provide teaching via preferred learning method  Evaluate teaching effectiveness    __M__ Hemodynamic/Respiratory Status:       (Pre and Post Procedure Monitoring)  Assess/Monitor vital signs and LOC  Assess Baseline SpO2 prior to any sedation  Obtain weight/height  Assess vital signs/ LOC until patient meets discharge criteria  Monitor procedure site and notify MD of any issues

## 2022-08-16 NOTE — PROGRESS NOTES
H&P, US guided paracentesis, standing orders, faxed to [facility] . Pt notified of appointment 08/17/2022 @ 8:00 [facility].

## 2022-08-16 NOTE — PROGRESS NOTES
Fatoumata Madrid (:  1955) is a 61 y.o. female,Established patient, here for evaluation of the following chief complaint(s):  Bloated (Extended abdomin, can't eat, gets short of breath)         ASSESSMENT/PLAN:  1. Abdominal distension  -     US ABDOMEN LIMITED; Future  -unknown diagnosis/prognosis, likely due to ascites, will set up comfort paracentesis. Repeat as needed. 2. Renal cell carcinoma of right kidney ([address]  -     US ABDOMEN LIMITED; Future  -chronic condition, exacerbated, set up comfort paracentesis  3. Type 2 diabetes mellitus with stage 3 chronic kidney disease, without long-term current use of insulin, unspecified whether stage 3a or 3b CKD ([facility] [address]  -stable, controlled on amaryl,   Lab Results   Component Value Date    LABA1C 8.5 (H) 2022     No results found for: EAG    No follow-ups on file. Subjective   SUBJECTIVE/OBJECTIVE:  HPI   Patient here today for a check up. Reviewed BMI of 39. Encouraged diet, exercise and weight loss. Progressive abdominal bloating, sob over the last 3 weeks. Has known metastatic renal cell CA. Is getting pain pills from oncology. Is having bowel movements. Could be fluid build up in the abdomen. , former smoker, pmh reviewed. Review of Systems   Constitutional:  Negative for chills, fatigue, fever and unexpected weight change. HENT:  Negative for congestion, ear pain, rhinorrhea and sore throat. Eyes:  Negative for pain and visual disturbance. Respiratory:  Positive for shortness of breath. Negative for cough, chest tightness and wheezing. Cardiovascular:  Negative for chest pain and palpitations. Gastrointestinal:  Positive for abdominal distention and abdominal pain. Negative for blood in stool, constipation, diarrhea, nausea and vomiting. Genitourinary:  Negative for difficulty urinating, frequency, hematuria and urgency. Musculoskeletal:  Negative for back pain, joint swelling, myalgias and neck pain. Skin:  Negative for rash. Neurological:  Negative for dizziness and headaches. Hematological:  Negative for adenopathy. Does not bruise/bleed easily. Psychiatric/Behavioral:  Negative for behavioral problems and sleep disturbance. The patient is not nervous/anxious. Objective   Physical Exam  Vitals and nursing note reviewed. Constitutional:       Appearance: She is well-developed. HENT:      Head: Normocephalic and atraumatic. Right Ear: External ear normal.      Left Ear: External ear normal.      Nose: Nose normal.      Mouth/Throat:      Mouth: Mucous membranes are moist.   Eyes:      Pupils: Pupils are equal, round, and reactive to light. Neck:      Thyroid: No thyromegaly. Cardiovascular:      Rate and Rhythm: Normal rate and regular rhythm. Heart sounds: Normal heart sounds. Pulmonary:      Breath sounds: Normal breath sounds. No wheezing or rales. Abdominal:      General: Bowel sounds are normal. There is distension. Palpations: Abdomen is soft. Tenderness: There is generalized abdominal tenderness. There is no guarding or rebound. Musculoskeletal:         General: Normal range of motion. Cervical back: Neck supple. Lymphadenopathy:      Cervical: No cervical adenopathy. Skin:     General: Skin is warm and dry. Findings: No rash. Neurological:      Mental Status: She is alert and oriented to person, place, and time. Cranial Nerves: No cranial nerve deficit. Deep Tendon Reflexes: Reflexes are normal and symmetric. An electronic signature was used to authenticate this note.     --Veena Roman MD

## 2022-09-01 NOTE — TELEPHONE ENCOUNTER
Patient called in and was transported by squad to the hospital and was released. Patient states hospice is coming today to talk with her. She states her sugars are running in the 50's and are making her feel rotten. She would like to know if it is ok to just stop her glimepiride? Please advise.

## 2022-09-06 NOTE — PROGRESS NOTES
1140 Patient in wheelchair to [facility] for Abdomen drain placement. Patient has history of kidney cancer with mets to liver. Right kidney removed. Patient reports mass where the right kidney was.  at bedside. Patient states she took her Aspirin yesterday not today. Also had egg this morning with an ensure. Patient forgot she was suppose to be NPO. States she started hospice recently. PT RIGHTS AND RESPONSIBILITIES OFFERED TO PT.     4541 Patient to IR.    1404 Patient back to room from IR. Dressing to right abdomen dry and intact. Vital signs stable. 1420 Patient resting in bed. Denies any complaints. Dressing to abdomen dry and intact. 1440 Patient resting in bed. Denies any complaints. Dressing to abdomen dry and intact. No signs of bleeding. Sat patient to edge of bed. Doing well. AVS reviewed with patient and . Verbalizes understanding. Patient discharged in wheelchair to [facility].          _M___ Safety:       (Environmental)  [place] to environment  Ensure ID band is correct and in place/ allergy band as needed  Assess for fall risk  Initiate fall precautions as applicable (fall band, side rails, etc.)  Call light within reach  Bed in low position/ wheels locked    _M___ Pain:       Assess pain level and characteristics  Administer analgesics as ordered  Assess effectiveness of pain management and report to MD as needed    _M___ Knowledge Deficit:  Assess baseline knowledge  Provide teaching at level of understanding  Provide teaching via preferred learning method  Evaluate teaching effectiveness    _M___ Hemodynamic/Respiratory Status:       (Pre and Post Procedure Monitoring)  Assess/Monitor vital signs and LOC  Assess Baseline SpO2 prior to any sedation  Obtain weight/height  Assess vital signs/ LOC until patient meets discharge criteria  Monitor procedure site and notify MD of any issues

## 2022-09-06 NOTE — OP NOTE
Department of Radiology  Post Procedure Progress Note      Pre-Procedure Diagnosis:  ascites, cancer     Procedure Performed:  pleurx drainge cath insertion     Anesthesia: local / versed and fentanyl    Findings: successful, 2 L chayito colored serous fluid aspirated    Immediate Complications:  None    Estimated Blood Loss: minimal    SEE DICTATED PROCEDURE NOTE FOR COMPLETE DETAILS.     Flako Nava MD   9/6/2022 1:49 PM

## 2022-09-06 NOTE — DISCHARGE INSTRUCTIONS
KEEP DRESSING CLEAN, DRY AND INTACT. KEEP DRESSING ON. MONITOR FOR SIGNS OF INFECTION SUCH AS FEVER, REDNESS OR SWELLING. IF ANY OF THE ABOVE OCCUR, ALONG WITH SHORTNESS OF BREATH OR EXCESSIVE BLEEDING, PLEASE GO TO EMERGENCY ROOM.

## 2022-09-06 NOTE — H&P
Formulation and discussion of sedation / procedure plans, risks, benefits, side effects and alternatives with patient and/or responsible adult completed.     Electronically signed by Lobo Scott MD on 9/6/22 at 1:30 PM EDT

## 2022-09-06 NOTE — H&P
[facility]  Sedation/Analgesia History & Physical    Pt Name: Landry Funes  MRN: [id number]  YOB: 1955  Provider Performing Procedure: Lobo Scott MD, MD  Primary Care Physician: Teodora Geller MD    PRE-PROCEDURE   DNR-CCA/DNR-CC []Yes [x]No  Brief History/Pre-Procedure Diagnosis: ascites, renal cancer           MEDICAL HISTORY  []CAD/Valve  []Liver Disease  []Lung Disease []Diabetes  []Hypertension []Renal Disease  []Additional information:       has a past medical history of Allergic rhinitis, CKD stage 3 due to type 2 diabetes mellitus ([address] Depression, DM type 2 causing CKD stage 3 ([address] Headache(784.0), Hypertension, Multinodular goiter, Persistent proteinuria associated with type 2 diabetes mellitus ([address] and Pure hypercholesterolemia. SURGICAL HISTORY   has a past surgical history that includes Dilation and curettage of uterus (2016) and US BIOPSY LIVER PERCUTANEOUS (6/1/2022).   Additional information:       ALLERGIES   Allergies as of 09/06/2022 - Fully Reviewed 09/06/2022   Allergen Reaction Noted    Iodine Hives 06/06/2014    Sulfa antibiotics  10/18/2011     Additional information:       MEDICATIONS   Coumadin Use Last 5 Days [x]No []Yes  Antiplatelet drug therapy use last 5 days  []No [x]Yes  Other anticoagulant use last 5 days  [x]No []Yes    Current Outpatient Medications:     HYDROcodone-acetaminophen (NORCO) 5-325 MG per tablet, Take 1 tablet by mouth every 6 hours as needed for Pain., Disp: , Rfl:     midodrine (PROAMATINE) 10 MG tablet, Take 1 tablet by mouth 3 times daily as needed (low blood pressure ( SBP < 100)), Disp: 60 tablet, Rfl: 1    patiromer sorbitex calcium (VELTASSA) 8.4 g PACK packet, Take 1 packet 3 times weekly, Disp: 30 each, Rfl: 3    traZODone (DESYREL) 50 MG tablet, TAKE 2 TO 3 TABLETS BY  MOUTH AT NIGHT FOR SLEEP, Disp: 270 tablet, Rfl: 3    citalopram (CELEXA) 20 MG tablet, TAKE 1 TABLET DAILY, Disp: 90 tablet, Rfl: 3 glimepiride (AMARYL) 4 MG tablet, Take 1 tablet by mouth 2 times daily (before meals), Disp: 180 tablet, Rfl: 3    Lancets Misc. MISC, Use once daily and as needed for glucose testing., Disp: 100 each, Rfl: 3    blood glucose monitor strips, Test 1 time a day & as needed for symptoms of irregular blood glucose. Dispense sufficient amount for indicated testing frequency plus additional to accommodate PRN testing needs. Please ensure compatibility with [employer] Next One glucometer. , Disp: 100 strip, Rfl: 3    aspirin 81 MG EC tablet, Take 81 mg by mouth daily, Disp: , Rfl:     fluticasone-salmeterol (ADVAIR DISKUS) 250-50 MCG/DOSE AEPB, Inhale 1 puff into the lungs every 12 hours, Disp: 180 each, Rfl: 3    cetirizine (ZYRTEC) 10 MG tablet, Take 10 mg by mouth daily. , Disp: , Rfl:     Current Facility-Administered Medications:     midazolam (VERSED) injection 1 mg, 1 mg, IntraVENous, Once, Eliezer Parker MD    0.45 % sodium chloride infusion, , IntraVENous, Continuous, Eliezer Parkre MD, Last Rate: 20 mL/hr at 09/06/22 1210, New Bag at 09/06/22 1210    fentaNYL (SUBLIMAZE) injection 50 mcg, 50 mcg, IntraVENous, Once, Eliezer Parker MD  Prior to Admission medications    Medication Sig Start Date End Date Taking? Authorizing Provider   HYDROcodone-acetaminophen (NORCO) 5-325 MG per tablet Take 1 tablet by mouth every 6 hours as needed for Pain.    Yes Historical Provider, MD   midodrine (PROAMATINE) 10 MG tablet Take 1 tablet by mouth 3 times daily as needed (low blood pressure ( SBP < 100)) 8/2/22   Kimmie Carrillo DO   patiromer sorbitex calcium (VELTASSA) 8.4 g PACK packet Take 1 packet 3 times weekly 7/28/22   Dede Carrillo DO   traZODone (DESYREL) 50 MG tablet TAKE 2 TO 3 TABLETS BY  MOUTH AT NIGHT FOR SLEEP 7/11/22   Alfredo Dubon MD   citalopram (CELEXA) 20 MG tablet TAKE 1 TABLET DAILY 7/11/22   Alfredo Dubon MD   glimepiride (AMARYL) 4 MG tablet Take 1 tablet by mouth 2 times daily (before meals) 7/11/22   Neelam Larson MD   Lancets Misc. MISC Use once daily and as needed for glucose testing. 7/1/22   CYRUS Lopez Si, CNP   blood glucose monitor strips Test 1 time a day & as needed for symptoms of irregular blood glucose. Dispense sufficient amount for indicated testing frequency plus additional to accommodate PRN testing needs. Please ensure compatibility with [employer] Next One glucometer. 7/1/22   CYRUS Lopez Si, CNP   aspirin 81 MG EC tablet Take 81 mg by mouth daily    Historical Provider, MD   fluticasone-salmeterol (ADVAIR DISKUS) 250-50 MCG/DOSE AEPB Inhale 1 puff into the lungs every 12 hours 10/26/20   Neelam Larson MD   cetirizine (ZYRTEC) 10 MG tablet Take 10 mg by mouth daily. Historical Provider, MD     Additional information:       VITAL SIGNS   Vitals:    09/06/22 1329   BP: (!) 123/55   Pulse: 98   Resp: 17   Temp:    SpO2: 97%       PHYSICAL:   Heart:  [x]Regular rate and rhythm  []Other:    Lungs:  [x]Clear    []Other:    Abdomen: [x]Soft    []Other:    Mental Status: [x]Alert & Oriented  []Other:      PLANNED PROCEDURE   []Biospy []Arteriogram              [x]Drainage   []Mediport Insertion  []Fistulogram []IV access       []Vertebroplasty / Augmentation  []IVC filter []Dialysis catheter []Biliary drainage  []Other: []CAPD Catheter []Nephrostomy Tube / Stent  SEDATION  Planned agent:[x]Midazolam []Meperidine [x]Sublimaze []Dilaudid []Morphine     []Diazepam  []Other:     ASA Classification:  []1 [x]2 []3 []4 []5  Class 1: A normal healthy patient  Class 2: Pt with mild to moderate systemic disease  Class 3: Severe systemic disease or disturbance  Class 4: Severe systemic disorders that are already life threatening. Class 5: Moribund pt with little chances of survival, for more than 24 hours. Mallampati I Airway Classification:   []1 [x]2 []3 []4    [x]Pre-procedure diagnostic studies complete and results available.    Comment:    [x]Previous sedation/anesthesia experiences assessed. Comment:    [x]The patient is an appropriate candidate to undergo the planned procedure sedation and anesthesia. (Refer to nursing sedation/analgesia documentation record)  [x]Formulation and discussion of sedation/procedure plan, risks, and expectations with patient and/or responsible adult completed. [x]Patient examined immediately prior to the procedure.  (Refer to nursing sedation/analgesia documentation record)    Royal Jeremiah MD, MD  Electronically signed 9/6/2022 at 1:31 PM

## 2022-09-06 NOTE — PROGRESS NOTES
1315 Patient received in IR for abdominal pleurx catheter insertion. 1318 This procedure has been fully reviewed with the patient and written informed consent has been obtained. 1334 Procedure started with Dr. Fletcher Torres. [phone] Procedure completed; patient tolerated well. Bio-patch, foam pad, guaze, and op-site applied to exit site; no bleeding noted. 1357 Patient on cart; comfort ensured. 1358 Patient taken to OPN via cart. Report called to [employer] WENDIE HUGHES in OPN.

## 2023-02-21 NOTE — PATIENT INSTRUCTIONS
Reduce Propranolol to 80 mg daily. Low Potassium Diet:        Foods are low in potassium. Plan to eat these every day. But don't eat more than 4 servings a day. A serving equals 1 small piece of fruit or ½ cup.  Fresh fruit: Apples, applesauce, blueberries, grapes, pineapple, plums, watermelon    Canned fruit: Peaches and pears.  Vegetables: Green or wax beans,broccoli, cabbage, carrots, corn, lettuce, radishes, green peas spinach.  Cheese, Pasta, rice, bread     Foods are high in potassium. Don't eat more than 1 serving of these a day. A serving equals 1 small piece of fruit or ½ cup.  Fresh fruit: Blackberries, boysenberries, cherries, grapefruit, strawberries   Vegetables: Asparagus, carrots, beets, corn, turnips, canned tomatoes, white mushrooms, and zucchini.  Milk and yogurt. Don't eat more than 1 cup a day.  Foods are very high in potassium. Avoid these foods.  Fruits: Apricots, bananas, dates, figs, honeydew, cantaloupe, raisins, prunes, kiwi fruit, nectarines, oranges, and orange juice, watermelon   Vegetables: Avocados, artichokes, brussels sprouts, potatoes, leafy  green vegetables (such as spinach), winter squash, fresh tomatoes, tomato paste, yams, and dried peas, beans, and lentils.  Clams, chocolate, nuts, seeds, molasses, and sardines. Lower potassium in potatoes by \"leaching\". Boil the potatoes in water and then drain the liquid off. Repeat the rinse twice. Sherita Shall Do not use     salt substitute or \"lite\" salt,     Darrick Lite Salt    No Salt, Nu Salt.   These often are very high in potassium.       Mrs Georges Desai is ok to use since it does not contain potassium      Potassium Content of Foods   (listing by high to low content)    Food  Serving Size Potassium (mg)   Baked potato (flesh only) one medium 610   Sweet potato, baked one medium 542   Banana, raw  one medium 422   Spinach, cooked ½ C 420   [place] beans, cooked ½ C 373   Kidney beans, cooked ½ C 371 Lentils, cooked ½ C 366   Navy beans, cooked ½ C 354   Plums, dried, pitted five 350   Artichokes, cooked one medium 343   Mashed potatoes ½ C 343   Edamame/soybeans, green ½ C 338   Tomato, canned  ½ C 325   Raisins ¼ C 299   Tomato, raw one medium 292   Papaya one small 286   Peach one medium 285   Pistachios, dry roasted, salted  1 oz (47 nuts) 285   Pumpkin, cooked, mashed ½ C 282   French fries 10 fries 278   Mushrooms, white, cooked ½ C 278   Beets, cooked ½ C 259   Temple sprouts, cooked ½ C 247   Kiwi one medium 237   Orange, raw one medium 237   Green peas, cooked ½ C 217   Cantaloupe, raw ½ C 214   Pear, raw one medium 212   Almonds, dry roasted 1 oz (24 nuts) 202   Apricot, canned, juice pack ½ C 202   Asparagus, cooked ½ C 202   [place] squash, cooked ½ C 201   Apple, raw with skin one medium 195   Honeydew ½ C 194   Carrots, cooked ½ C 183   Onions, cooked ½ C 175   Spinach, raw 1 C 167   Corn, sweet yellow ½ C 163   Red braun pepper ½ C 157   Kale, cooked ½ C 150   Cabbage, cooked ½ C 147   Mustard greens, cooked ½ C 142   Pieter ½ C 139   Figs, dried two figs 134   Summer squash, cooked ½ C 126   Grapes 10 grapes 120   Okra, cooked ½ C 108   Bamboo shoots, canned 1 C 108   Celery, raw one stalk 105   Peanut butter, creamy 1 Tbsp 104   Green beans, cooked ½ C 104   Cauliflower, cooked ½ C 91   Mushrooms, shitake, cooked ½ C 88   Watermelon ½ C 85   Cucumber, peeled ½ C 88   Iceberg lettuce 1 C 81   Tomato, sun dried one piece 80   Eggplant, cooked ½ C 69   Pickle one pickle 61   Ketchup 1 Tbsp 60   Radishes one radish 57     5   C=cup, mg=milligrams, oz=ounces, Tbsp=tablespoon    Reference  [place] Dept of Agriculture, Agricultural Research Service, [employer]  [place] [employer] Database for Standard Reference, Release 25: Potassium, K (mg) content of selected foods per common measure No